# Patient Record
Sex: FEMALE | Race: WHITE | NOT HISPANIC OR LATINO | Employment: OTHER | URBAN - METROPOLITAN AREA
[De-identification: names, ages, dates, MRNs, and addresses within clinical notes are randomized per-mention and may not be internally consistent; named-entity substitution may affect disease eponyms.]

---

## 2017-01-30 ENCOUNTER — HOSPITAL ENCOUNTER (INPATIENT)
Facility: OTHER | Age: 82
LOS: 4 days | Discharge: HOME HEALTH | DRG: 194 | End: 2017-02-06
Attending: EMERGENCY MEDICINE | Admitting: HOSPITALIST
Payer: MEDICARE

## 2017-01-30 DIAGNOSIS — R05.9 COUGH: ICD-10-CM

## 2017-01-30 DIAGNOSIS — N17.9 AKI (ACUTE KIDNEY INJURY): Primary | ICD-10-CM

## 2017-01-30 DIAGNOSIS — N18.4 CHRONIC KIDNEY DISEASE, STAGE IV (SEVERE): ICD-10-CM

## 2017-01-30 DIAGNOSIS — I50.9 ACUTE CONGESTIVE HEART FAILURE, UNSPECIFIED CONGESTIVE HEART FAILURE TYPE: ICD-10-CM

## 2017-01-30 DIAGNOSIS — J11.1 FLU: ICD-10-CM

## 2017-01-30 DIAGNOSIS — E87.5 HYPERKALEMIA: ICD-10-CM

## 2017-01-30 DIAGNOSIS — J10.1 INFLUENZA A: ICD-10-CM

## 2017-01-30 PROBLEM — E87.8 HYPOCHLOREMIA: Status: ACTIVE | Noted: 2017-01-30

## 2017-01-30 PROBLEM — E83.51 HYPOCALCEMIA: Status: ACTIVE | Noted: 2017-01-30

## 2017-01-30 LAB
ALBUMIN SERPL BCP-MCNC: 3.6 G/DL
ALP SERPL-CCNC: 68 U/L
ALT SERPL W/O P-5'-P-CCNC: 39 U/L
ANION GAP SERPL CALC-SCNC: 13 MMOL/L
AST SERPL-CCNC: 34 U/L
BASOPHILS # BLD AUTO: ABNORMAL K/UL
BASOPHILS NFR BLD: 0 %
BILIRUB SERPL-MCNC: 0.4 MG/DL
BNP SERPL-MCNC: 608 PG/ML
BUN SERPL-MCNC: 72 MG/DL
CALCIUM SERPL-MCNC: 8.1 MG/DL
CHLORIDE SERPL-SCNC: 112 MMOL/L
CO2 SERPL-SCNC: 22 MMOL/L
CREAT SERPL-MCNC: 2.6 MG/DL
DIFFERENTIAL METHOD: ABNORMAL
EOSINOPHIL # BLD AUTO: ABNORMAL K/UL
EOSINOPHIL NFR BLD: 2 %
ERYTHROCYTE [DISTWIDTH] IN BLOOD BY AUTOMATED COUNT: 15.4 %
EST. GFR  (AFRICAN AMERICAN): 18 ML/MIN/1.73 M^2
EST. GFR  (NON AFRICAN AMERICAN): 16 ML/MIN/1.73 M^2
FLUAV AG SPEC QL IA: POSITIVE
FLUBV AG SPEC QL IA: NEGATIVE
GLUCOSE SERPL-MCNC: 88 MG/DL
HCT VFR BLD AUTO: 39.8 %
HGB BLD-MCNC: 13 G/DL
LYMPHOCYTES # BLD AUTO: ABNORMAL K/UL
LYMPHOCYTES NFR BLD: 16 %
MCH RBC QN AUTO: 30.9 PG
MCHC RBC AUTO-ENTMCNC: 32.7 %
MCV RBC AUTO: 95 FL
MONOCYTES # BLD AUTO: ABNORMAL K/UL
MONOCYTES NFR BLD: 10 %
NEUTROPHILS NFR BLD: 67 %
NEUTS BAND NFR BLD MANUAL: 5 %
PLATELET # BLD AUTO: 225 K/UL
PLATELET BLD QL SMEAR: ABNORMAL
PMV BLD AUTO: 10.6 FL
POTASSIUM SERPL-SCNC: 5.5 MMOL/L
PROT SERPL-MCNC: 7.5 G/DL
RBC # BLD AUTO: 4.21 M/UL
SODIUM SERPL-SCNC: 147 MMOL/L
SPECIMEN SOURCE: ABNORMAL
WBC # BLD AUTO: 9.36 K/UL

## 2017-01-30 PROCEDURE — 99285 EMERGENCY DEPT VISIT HI MDM: CPT | Mod: 25

## 2017-01-30 PROCEDURE — 93010 ELECTROCARDIOGRAM REPORT: CPT | Mod: ,,, | Performed by: INTERNAL MEDICINE

## 2017-01-30 PROCEDURE — 83880 ASSAY OF NATRIURETIC PEPTIDE: CPT

## 2017-01-30 PROCEDURE — 25000003 PHARM REV CODE 250: Performed by: PHYSICIAN ASSISTANT

## 2017-01-30 PROCEDURE — G0378 HOSPITAL OBSERVATION PER HR: HCPCS

## 2017-01-30 PROCEDURE — 85027 COMPLETE CBC AUTOMATED: CPT

## 2017-01-30 PROCEDURE — 99220 PR INITIAL OBSERVATION CARE,LEVL III: CPT | Mod: ,,, | Performed by: HOSPITALIST

## 2017-01-30 PROCEDURE — 80053 COMPREHEN METABOLIC PANEL: CPT

## 2017-01-30 PROCEDURE — 85007 BL SMEAR W/DIFF WBC COUNT: CPT

## 2017-01-30 PROCEDURE — 93005 ELECTROCARDIOGRAM TRACING: CPT

## 2017-01-30 PROCEDURE — 87400 INFLUENZA A/B EACH AG IA: CPT

## 2017-01-30 RX ORDER — FLUOXETINE 10 MG/1
10 CAPSULE ORAL DAILY
Status: DISCONTINUED | OUTPATIENT
Start: 2017-01-31 | End: 2017-02-06 | Stop reason: HOSPADM

## 2017-01-30 RX ORDER — DONEPEZIL HYDROCHLORIDE 5 MG/1
10 TABLET, FILM COATED ORAL DAILY
Status: DISCONTINUED | OUTPATIENT
Start: 2017-01-31 | End: 2017-02-06 | Stop reason: HOSPADM

## 2017-01-30 RX ORDER — HEPARIN SODIUM 5000 [USP'U]/ML
5000 INJECTION, SOLUTION INTRAVENOUS; SUBCUTANEOUS EVERY 8 HOURS
Status: DISCONTINUED | OUTPATIENT
Start: 2017-01-30 | End: 2017-02-06 | Stop reason: HOSPADM

## 2017-01-30 RX ORDER — OSELTAMIVIR PHOSPHATE 75 MG/1
30 CAPSULE ORAL 2 TIMES DAILY
Status: DISCONTINUED | OUTPATIENT
Start: 2017-01-30 | End: 2017-01-30

## 2017-01-30 RX ORDER — MEGESTROL ACETATE 40 MG/ML
200 SUSPENSION ORAL 3 TIMES DAILY
Status: ON HOLD | COMMUNITY
End: 2017-02-06 | Stop reason: HOSPADM

## 2017-01-30 RX ORDER — ACETAMINOPHEN 325 MG/1
650 TABLET ORAL EVERY 8 HOURS PRN
Status: DISCONTINUED | OUTPATIENT
Start: 2017-01-30 | End: 2017-02-06 | Stop reason: HOSPADM

## 2017-01-30 RX ORDER — ASPIRIN 81 MG/1
81 TABLET ORAL DAILY
Status: DISCONTINUED | OUTPATIENT
Start: 2017-01-31 | End: 2017-02-06 | Stop reason: HOSPADM

## 2017-01-30 RX ORDER — HYDROCODONE BITARTRATE AND ACETAMINOPHEN 5; 325 MG/1; MG/1
1 TABLET ORAL EVERY 4 HOURS PRN
Status: DISCONTINUED | OUTPATIENT
Start: 2017-01-30 | End: 2017-02-06 | Stop reason: HOSPADM

## 2017-01-30 RX ORDER — ONDANSETRON 2 MG/ML
4 INJECTION INTRAMUSCULAR; INTRAVENOUS EVERY 12 HOURS PRN
Status: DISCONTINUED | OUTPATIENT
Start: 2017-01-30 | End: 2017-02-06 | Stop reason: HOSPADM

## 2017-01-30 RX ORDER — QUETIAPINE FUMARATE 25 MG/1
25 TABLET, FILM COATED ORAL NIGHTLY
COMMUNITY

## 2017-01-30 RX ORDER — ALBUTEROL SULFATE 2.5 MG/.5ML
2.5 SOLUTION RESPIRATORY (INHALATION) EVERY 4 HOURS PRN
Status: DISCONTINUED | OUTPATIENT
Start: 2017-01-30 | End: 2017-01-31

## 2017-01-30 RX ORDER — AMLODIPINE BESYLATE 2.5 MG/1
2.5 TABLET ORAL DAILY
Status: DISCONTINUED | OUTPATIENT
Start: 2017-01-31 | End: 2017-01-31

## 2017-01-30 RX ORDER — GUAIFENESIN 100 MG/5ML
200 SOLUTION ORAL EVERY 4 HOURS PRN
Status: DISCONTINUED | OUTPATIENT
Start: 2017-01-30 | End: 2017-02-06 | Stop reason: HOSPADM

## 2017-01-30 RX ORDER — QUETIAPINE FUMARATE 25 MG/1
25 TABLET, FILM COATED ORAL DAILY
Status: DISCONTINUED | OUTPATIENT
Start: 2017-01-31 | End: 2017-02-06 | Stop reason: HOSPADM

## 2017-01-30 RX ORDER — HYDRALAZINE HYDROCHLORIDE 20 MG/ML
10 INJECTION INTRAMUSCULAR; INTRAVENOUS ONCE
Status: COMPLETED | OUTPATIENT
Start: 2017-01-31 | End: 2017-01-30

## 2017-01-30 RX ORDER — CINACALCET 30 MG/1
90 TABLET, FILM COATED ORAL DAILY
Status: DISCONTINUED | OUTPATIENT
Start: 2017-01-31 | End: 2017-01-31

## 2017-01-30 RX ORDER — AMOXICILLIN 250 MG
1 CAPSULE ORAL 2 TIMES DAILY
Status: DISCONTINUED | OUTPATIENT
Start: 2017-01-30 | End: 2017-02-06 | Stop reason: HOSPADM

## 2017-01-30 RX ORDER — FAMOTIDINE 20 MG/1
20 TABLET, FILM COATED ORAL 2 TIMES DAILY
Status: DISCONTINUED | OUTPATIENT
Start: 2017-01-30 | End: 2017-02-06 | Stop reason: HOSPADM

## 2017-01-30 RX ORDER — LEVOTHYROXINE SODIUM 150 UG/1
150 TABLET ORAL DAILY
COMMUNITY

## 2017-01-30 RX ORDER — LEVOTHYROXINE SODIUM 75 UG/1
150 TABLET ORAL DAILY
Status: DISCONTINUED | OUTPATIENT
Start: 2017-01-31 | End: 2017-02-06 | Stop reason: HOSPADM

## 2017-01-30 RX ORDER — FLUOXETINE 10 MG/1
20 CAPSULE ORAL DAILY
Status: ON HOLD | COMMUNITY
End: 2017-03-09

## 2017-01-30 RX ORDER — OSELTAMIVIR PHOSPHATE 6 MG/ML
30 FOR SUSPENSION ORAL 2 TIMES DAILY
Status: DISCONTINUED | OUTPATIENT
Start: 2017-01-30 | End: 2017-01-31

## 2017-01-30 RX ADMIN — HEPARIN SODIUM 5000 UNITS: 5000 INJECTION, SOLUTION INTRAVENOUS; SUBCUTANEOUS at 11:01

## 2017-01-30 RX ADMIN — FAMOTIDINE 20 MG: 20 TABLET, FILM COATED ORAL at 11:01

## 2017-01-30 RX ADMIN — HYDRALAZINE HYDROCHLORIDE 10 MG: 20 INJECTION INTRAMUSCULAR; INTRAVENOUS at 11:01

## 2017-01-30 NOTE — IP AVS SNAPSHOT
[facility] Location ([facility])  [address] [place] [address]  Phone: [phone]           Patient Discharge Instructions     Our goal is to set you up for success. This packet includes information on your condition, medications, and your home care. It will help you to care for yourself so you don't get sicker and need to go back to the hospital.     Please ask your nurse if you have any questions.        There are many details to remember when preparing to leave the hospital. Here is what you will need to do:    1. Take your medicine. If you are prescribed medications, review your Medication List in the following pages. You may have new medications to  at the pharmacy and others that you'll need to stop taking. Review the instructions for how and when to take your medications. Talk with your doctor or nurses if you are unsure of what to do.     2. Go to your follow-up appointments. Specific follow-up information is listed in the following pages. Your may be contacted by a transition nurse or clinical provider about future appointments. Be sure we have all of the phone numbers to reach you, if needed. Please contact your provider's office if you are unable to make an appointment.     3. Watch for warning signs. Your doctor or nurse will give you detailed warning signs to watch for and when to call for assistance. These instructions may also include educational information about your condition. If you experience any of warning signs to your health, call your doctor.               ** Verify the list of medication(s) below is accurate and up to date. Carry this with you in case of emergency. If your medications have changed, please notify your healthcare provider.             Medication List      START taking these medications        Additional Info                      carvedilol 6.25 MG tablet   Commonly known as:  COREG   Quantity:  60 tablet   Refills:  0   Dose:  6.25 mg    Last time  this was given:  6.25 mg on 2/6/2017  9:49 AM   Instructions:  Take 1 tablet (6.25 mg total) by mouth 2 (two) times daily with meals.     Begin Date    AM    Noon    PM    Bedtime       guaifenesin 100 mg/5 ml 100 mg/5 mL syrup   Commonly known as:  ROBITUSSIN   Refills:  0   Dose:  200 mg    Instructions:  Take 10 mLs (200 mg total) by mouth every 4 (four) hours as needed for Cough or Congestion.     Begin Date    AM    Noon    PM    Bedtime       senna-docusate 8.6-50 mg 8.6-50 mg per tablet   Commonly known as:  PERICOLACE   Refills:  0   Dose:  1 tablet    Last time this was given:  1 tablet on 2/6/2017  9:44 AM   Instructions:  Take 1 tablet by mouth 2 (two) times daily.     Begin Date    AM    Noon    PM    Bedtime         CHANGE how you take these medications        Additional Info                      lisinopril 10 MG tablet   Quantity:  30 tablet   Refills:  0   Dose:  10 mg   What changed:    - medication strength  - how much to take    Last time this was given:  10 mg on 2/6/2017  9:44 AM   Instructions:  Take 1 tablet (10 mg total) by mouth once daily.     Begin Date    AM    Noon    PM    Bedtime         CONTINUE taking these medications        Additional Info                      acetaminophen 500 mg Cap   Commonly known as:  TYLENOL   Refills:  0      Begin Date    AM    Noon    PM    Bedtime       aspirin 81 MG EC tablet   Commonly known as:  ECOTRIN   Quantity:  90 tablet   Refills:  3   Dose:  81 mg    Last time this was given:  81 mg on 2/6/2017  9:43 AM   Instructions:  Take 1 tablet (81 mg total) by mouth once daily.     Begin Date    AM    Noon    PM    Bedtime       calcitRIOL 0.25 MCG Cap   Commonly known as:  ROCALTROL   Refills:  0   Dose:  0.25 mcg    Last time this was given:  0.25 mcg on 2/6/2017  9:43 AM   Instructions:  Take 0.25 mcg by mouth once daily.     Begin Date    AM    Noon    PM    Bedtime       donepezil 10 MG tablet   Commonly known as:  ARICEPT   Refills:  0   Dose:  10 mg     Last time this was given:  10 mg on 2/6/2017  9:43 AM   Instructions:  Take 10 mg by mouth once daily.     Begin Date    AM    Noon    PM    Bedtime       fluoxetine 10 MG capsule   Commonly known as:  PROZAC   Refills:  0   Dose:  10 mg    Last time this was given:  10 mg on 2/6/2017  9:43 AM   Instructions:  Take 10 mg by mouth once daily.     Begin Date    AM    Noon    PM    Bedtime       levothyroxine 150 MCG tablet   Commonly known as:  SYNTHROID   Refills:  0   Dose:  150 mcg    Last time this was given:  150 mcg on 2/6/2017  6:05 AM   Instructions:  Take 150 mcg by mouth once daily.     Begin Date    AM    Noon    PM    Bedtime       oxycodone-acetaminophen 5-325 mg per tablet   Commonly known as:  PERCOCET   Quantity:  20 tablet   Refills:  0   Dose:  1 tablet    Instructions:  Take 1 tablet by mouth every 4 (four) hours as needed for Pain.     Begin Date    AM    Noon    PM    Bedtime       polyethylene glycol 3350 8.5 gram Pwpk   Refills:  0      Begin Date    AM    Noon    PM    Bedtime       quetiapine 25 MG Tab   Commonly known as:  SEROQUEL   Refills:  0   Dose:  25 mg    Last time this was given:  25 mg on 2/6/2017  9:43 AM   Instructions:  Take 25 mg by mouth once daily.     Begin Date    AM    Noon    PM    Bedtime         STOP taking these medications     amlodipine 2.5 MG tablet   Commonly known as:  NORVASC       AVASTIN 25 mg/mL injection   Generic drug:  bevacizumab       famciclovir 250 MG Tab   Commonly known as:  FAMVIR       megestrol 400 mg/10 mL (40 mg/mL) Susp   Commonly known as:  MEGACE       PREVNAR 13 (PF) 0.5 mL Syrg   Generic drug:  pneumoc 13-apryl conj-dip cr(PF)       PROLIA 60 mg/mL Syrg   Generic drug:  denosumab       SENSIPAR 90 MG Tab   Generic drug:  cinacalcet            Where to Get Your Medications      These medications were sent to [id number] [address] AT [facility] [address], [place]  [address]     Phone:  [phone]     carvedilol 6.25 MG tablet    lisinopril 10 MG tablet         You can get these medications from any pharmacy     You don't need a prescription for these medications     guaifenesin 100 mg/5 ml 100 mg/5 mL syrup    senna-docusate 8.6-50 mg 8.6-50 mg per tablet                  Please bring to all follow up appointments:    1. A copy of your discharge instructions.  2. All medicines you are currently taking in their original bottles.  3. Identification and insurance card.    Please arrive 15 minutes ahead of scheduled appointment time.    Please call 24 hours in advance if you must reschedule your appointment and/or time.        Your Scheduled Appointments     May 03, 2017 10:30 AM CDT   Established Patient Visit with Adrien Lazo MD   [facility] ([facility])    [address]  [address]  [place] [address] [phone]              Follow-up Information     Follow up with Susana Arvizu MD. Go on 2/8/2017.    Specialty:  Nephrology    Contact information:    [address]  [address]  [place] [address] [phone]          Follow up with [facility] Karissa.    Specialty:  Home Health Services    Why:  Home Health    Contact information:    [facility] [address] [place] [facility] [phone]          Discharge Instructions     Future Orders    Activity as tolerated     Call MD for:  difficulty breathing or increased cough     Call MD for:  increased confusion or weakness     Call MD for:  persistent dizziness, light-headedness, or visual disturbances     Call MD for:  persistent nausea and vomiting or diarrhea     Call MD for:  severe persistent headache     Call MD for:  severe uncontrolled pain     Call MD for:  temperature >100.4     Call MD for:  worsening rash     Diet renal         Primary Diagnosis     Your primary diagnosis was:  Flu      Admission Information     Date & Time Provider Department CSN    1/30/2017  6:03 PM  "Juanpablo Orr MD [facility] [id number]      Care Providers     Provider Role Specialty Primary office phone    Juanpablo Orr MD Attending Provider Hospitalist [phone]    Susana Arvizu MD Consulting Physician  Nephrology [phone]      Important Medicare Message          Most Recent Value    Important Message from Medicare Regarding Discharge Appeal Rights  Given to patient/caregiver, Explained to patient/caregiver, Signed/date by patient/caregiver yes 02/06/2017 1447      Your Vitals Were     BP Pulse Temp Resp Height Weight    104/52 (BP Location: Left arm, Patient Position: Lying, BP Method: Automatic) 98 98.9 °F (37.2 °C) (Oral) 18 5' 4" (1.626 m) 60.4 kg (133 lb 2.5 oz)    SpO2 BMI             95% 22.86 kg/m2         Recent Lab Values     No lab values to display.      Allergies as of 2/6/2017        Reactions    Penicillins     Doesn't remember it has been years      [facility]     [facility] Nurse Care Line - 24/7 Assistance  Unless otherwise directed by your provider, please contact [facility], our nurse care line that is available for 24/7 assistance.     Registered nurses in the [facility] provide clinical advisement, health education, appointment booking, and other advisory services.  Call for this free service at [phone].        Advance Directives     An advance directive is a document which, in the event you are no longer able to make decisions for yourself, tells your healthcare team what kind of treatment you do or do not want to receive, or who you would like to make those decisions for you.  If you do not currently have an advance directive, [facility] encourages you to create one.  For more information call:  [phone]), [phone] ([phone]),  or log on to [email or web address].        Language Assistance Services     ATTENTION: Language assistance services are available, free of charge. Please call " [phone].      ATENCIÓN: Si estellala prashanth, tiene a arriola disposición servicios gratuitos de asistencia lingüística. Abdoulaye al [phone].     [facility] Ý: N?u b?n nói Ti?ng Vi?t, có các d?ch v? h? tr? ngôn ng? mi?n phí dành cho b?n. G?i s? [phone].        Stroke Education              Heart Failure Education       Heart Failure: Being Active  You have a condition called heart failure. Being active doesnt mean that you have to wear yourself out. Even a little movement each day helps to strengthen your heart. If you cant get out to exercise, you can do simple stretching and strengthening exercises at home. These are good ways to keep you well-conditioned and prevent you and your heart from becoming excessively weak.    Ideas to get you started  · Add a little movement to things you do now. Walk to mail letters. Park your car at the far end of the parking lot and walk to the store. Walk up a flight of stairs instead of taking the elevator.  · Choose activities you enjoy. You might walk, swim, or ride an exercise bike. Things like gardening and washing the car count, too. Other possibilities include: washing dishes, walking the dog, walking around the mall, and doing aerobic activities with friends.  · Join a group exercise program at a [facility], a senior center, or a community center. Or look into a hospital cardiac rehabilitation program. Ask your doctor if you qualify.  Tips to keep you going  · Get up and get dressed each day. Go to a coffee shop and read a newspaper or go somewhere that you'll be in the presence of other active people. Youll feel more like being active.  · Make a plan. Choose one or more activities that you enjoy and that you can easily do. Then plan to do at least one each day. You might write your plan on a calendar.  · Go with a friend or a group if you like company. This can help you feel supported and stay motivated, too.  · Plan social events that you enjoy. This will keep you  mentally engaged as well as physically motivated to do things you find pleasure in.  For your safety  · Talk with your healthcare provider before starting an exercise program.  · Exercise indoors when its too hot or too cold outside, or when the air quality is poor. Try walking at a shopping mall.  · Wear socks and sturdy shoes to maintain your balance and prevent falls.  · Start slowly. Do a few minutes several times a day at first. Increase your time and speed little by little.  · Stop and rest whenever you feel tired or get short of breath.  · Dont push yourself on days when you dont feel well.  Date Last Reviewed: 3/20/2016  © 0106-8261 [employer]. [address] [place] [address], [place] [address]. All rights reserved. This information is not intended as a substitute for professional medical care. Always follow your healthcare professional's instructions.              Heart Failure: Evaluating Your Heart  You have a condition called heart failure. To evaluate your condition, your doctor will examine you, ask questions, and do some tests. Along with looking for signs of heart failure, the doctor looks for any other health problems that may have led to heart failure. The results of your evaluation will help your doctor form a treatment plan.  Health history and physical exam  Your visit will start with a health history. Tell the doctor about any symptoms youve noticed and about all medicines you take. Then youll have a physical exam. This includes listening to your heartbeat and breathing. Youll also be checked for swelling (edema) in your legs and neck. When you have fluid buildup or fluid in the lungs, it may be called congestive heart failure.  Diagnosing heart failure     During an echocardiogram, sound waves bounce off the heart. These are converted into a picture on the screen.   The following may be done to help your doctor form a diagnosis:  · X-rays show the size and shape of your heart.  These pictures can also show fluid in your lungs.  · An electrocardiogram (ECG or EKG) shows the pattern of your heartbeat. Small pads (electrodes) are placed on your chest, arms, and legs. Wires connect the pads to the ECG machine, which records your hearts electrical signals. This can give the doctor information about heart function.  · An echocardiogram uses ultrasound waves to show the structure and movement of your heart muscle. This shows how well the heart pumps. It also shows the thickness of the heart walls, and if the heart is enlarged. It is one of the most useful, non-invasive tests as it provides information about the heart's general function. This helps your doctor make treatment decisions.  · Lab tests evaluate small amounts of blood or urine for signs of problems. A BNP lab test can help diagnose and evaluate heart failure. BNP stands for B-type natriuretic peptide. The ventricles secrete more BNP when heart failure worsens. Lab tests can also provide information about metabolic dysfunction or heart dysfunction.  Your treatment plan  Based on the results of your evaluation and tests, your doctor will develop a treatment plan. This plan is designed to relieve some of your heart failure symptoms and help make you more comfortable. Your treatment plan may include:  · Medicine to help your heart work better and improve your quality of life  · Changes in what you eat and drink to help prevent fluid from backing up in your body  · Daily monitoring of your weight and heart failure symptoms to see how well your treatment plan is working  · Exercise to help you stay healthy  · Help with quitting smoking  · Emotional and psychological support to help adjust to the changes  · Referrals to other specialists to make sure you are being treated comprehensively  Date Last Reviewed: 3/21/2016  © 4634-8192 [employer]. [address] [place] [address], [place] [address]. All rights reserved. This information is  not intended as a substitute for professional medical care. Always follow your healthcare professional's instructions.              Heart Failure: Making Changes to Your Diet  You have a condition called heart failure. When you have heart failure, excess fluid is more likely to build up in your body because your heart isn't working well. This makes the heart work harder to pump blood. Fluid buildup causes symptoms such as shortness of breath and swelling (edema). This is often referred to as congestive heart failure or CHF. Controlling the amount of salt (sodium) you eat may help stop fluid from building up. Your doctor may also tell you to reduce the amount of fluid you drink.  Reading food labels    Your healthcare provider will tell you how much sodium you can eat each day. Read food labels to keep track. Keep in mind that certain foods are high in salt. These include canned, frozen, and processed foods. Check the amount of sodium in each serving. Watch out for high-sodium ingredients. These include MSG (monosodium glutamate), baking soda, and sodium phosphate.   Eating less salt  Give yourself time to get used to eating less salt. It may take a little while. Here are some tips to help:  · Take the saltshaker off the table. Replace it with salt-free herb mixes and spices.  · Eat fresh or plain frozen vegetables. These have much less salt than canned vegetables.  · Choose low-sodium snacks like sodium-free pretzels, crackers, or air-popped popcorn.  · Dont add salt to your food when youre cooking. Instead, season your foods with pepper, lemon, garlic, or onion.  · When you eat out, ask that your food be cooked without added salt.  · Avoid eating fried foods as these often have a great deal of salt.  If youre told to limit fluids  You may need to limit how much fluid you have to help prevent swelling. This includes anything that is liquid at room temperature, such as ice cream and soup. If your doctor tells you  to limit fluid, try these tips:  · Measure drinks in a measuring cup before you drink them. This will help you meet daily goals.  · Chill drinks to make them more refreshing.  · Suck on frozen lemon wedges to quench thirst.  · Only drink when youre thirsty.  · Chew sugarless gum or suck on hard candy to keep your mouth moist.  · Weigh yourself daily to know if your body's fluid content is rising.  My sodium goal  Your healthcare provider may give you a sodium goal to meet each day. This includes sodium found in food as well as salt that you add. My goal is to eat no more than ___________ mg of sodium per day.     When to call your doctor  Call your doctor right away if you have any symptoms of worsening heart failure. These can include:  · Sudden weight gain  · Increased swelling of your legs or ankles  · Trouble breathing when youre resting or at night  · Increase in the number of pillows you have to sleep on  · Chest pain, pressure, discomfort, or pain in the jaw, neck, or back   Date Last Reviewed: 3/21/2016  © 7011-3291 [employer]. [address] [place] [address]. All rights reserved. This information is not intended as a substitute for professional medical care. Always follow your healthcare professional's instructions.              Heart Failure: Medicines to Help Your Heart    You have a condition called heart failure (also known as congestive heart failure, or CHF). Your doctor will likely prescribe medicines for heart failure and any underlying health problems you have. Most heart failure patients take one or more types of medicinen. Your healthcare provider will work to find the combination of medicines that works best for you.  Heart failure medicines  Here are the most common heart failure medicines:  · ACE inhibitors lower blood pressure and decrease strain on the heart. This makes it easier for the heart to pump. Angiotensin receptor blockers have similar effects. These are  prescribed for some patients instead of ACE inhibitors.  · Beta-blockers relieve stress on the heart. They also improve symptoms. They may also improve the heart's pumping action over time.  · Diuretics (also called water pills) help rid your body of excess water. This can help rid your body of swelling (edema). Having less fluid to pump means your heart doesnt have to work as hard. Some diuretics make your body lose a mineral called potassium. Your doctor will tell you if you need to take supplements or eat more foods high in potassium.  · Digoxin helps your heart pump with more strength. This helps your heart pump more blood with each beat. So, more oxygen-rich blood travels to the rest of the body.  · Aldosterone antagonists help alter hormones and decrease strain on the heart.  · Hydralazine and nitrates are two separate medicines used together to treat heart failure. They may come in one combination pill. They lower blood pressure and decrease how hard the heart has to pump.  Medicines for related conditions  Controlling other heart problems helps keep heart failure under control, too. Depending on other heart problems you have, medicines may be prescribed to:  · Lower blood pressure (antihypertensives).  · Lower cholesterol levels (statins).  · Prevent blood clots (anticoagulants or aspirin).  · Keep the heartbeat steady (antiarrhythmics).  Date Last Reviewed: 3/5/2016  © 1015-0295 [employer]. [address] [place] [address], [place] [address]. All rights reserved. This information is not intended as a substitute for professional medical care. Always follow your healthcare professional's instructions.              Heart Failure: Procedures That May Help    The heart is a muscle that pumps oxygen-rich blood to all parts of the body. When you have heart failure, the heart is not able to pump as well as it should. Blood and fluid may back up into the lungs (congestive heart failure), and some parts of  the body dont get enough oxygen-rich blood to work normally. These problems lead to the symptoms of heart failure.     Certain procedures may help the heart pump better in some cases of heart failure. Some procedures are done to treat health problems that may have caused the heart failure such as coronary artery disease or heart rhythm problems. For more serious heart failure, other options are available.  Treating artery and valve problems  If you have coronary artery disease or valve disease, procedures may be done to improve blood flow. This helps the heart pump better, which can improve heart failure symptoms. First, your doctor may do a cardiac catheterization to help detect clogged blood vessels or valve damage. During this procedure, a  thin tube (catheter) in inserted into a blood vessel and guided to the heart. There a dye is injected and a special type of X-ray (angiogram) is taken of the blood vessels. Procedures to open a blocked artery or fix damaged valves can also be done using catheterization.  · Angioplasty uses a balloon-tipped instrument at the end of the catheter. The balloon is inflated to widen the narrowed artery. In many cases, a stent is expanded to further support the narrowed artery. A stent is a metal mesh tube.  · Valve surgery repairs or replacement of faulty valves can also be done during catheterization so blood can flow properly through the chambers of the heart.  Bypass surgery is another option to help treat blocked arteries. It uses a healthy blood vessel from elsewhere in the body. The healthy blood vessel is attached above and below the blocked area so that blood can flow around the blocked artery.  Treating heart rhythm problems  A device may be placed in the chest to help a weak heart maintain a healthy, heartbeat so the heart can pump more effectively:  · Pacemaker. A pacemaker is an implanted device that regulates your heartbeat electronically. It monitors your heart's  rhythm and generates a painless electric impulse that helps the heart beat in a regular rhythm. A pacemaker is programmed to meet your specific heart rhythm needs.  · Biventricular pacing/cardiac resynchronization therapy. A type of pacemaker that paces both pumping chambers of the heart at the same time to coordinate contractions and to improve the heart's function. Some people with heart failure are candidates for this therapy.  · Implantable cardioverter defibrillator. A device similar to a pacemaker that senses when the heart is beating too fast and delivers an electrical shock to convert the fast rhythm to a normal rhythm. This can be a life saving device.  In severe cases  In more serious cases of heart failure when other treatments no longer work, other options may include:  · Ventricular assist devices (VADs). These are mechanical devices used to take over the pumping function for one or both of the heart's ventricles, or pumping chambers. A VAD may be necessary when heart failure progresses to the point that medicines and other treatments no longer help. In some cases, a VAD may be used as a bridge to transplant.  · Heart transplant. This is replacing the diseased heart with a healthy one from a donor. This is an option for a few people who are very sick. A heart transplant is very serious and not an option for all patients. Your doctor can tell you more.  Date Last Reviewed: 3/20/2016  © 6238-9703 [employer]. [address] [place] [address], [place] [address]. All rights reserved. This information is not intended as a substitute for professional medical care. Always follow your healthcare professional's instructions.              Heart Failure: Tracking Your Weight  You have a condition called heart failure. When you have heart failure, a sudden weight gain or a steady rise in weight is a warning sign that your body is retaining too much water and salt. This could mean your heart failure is getting  worse. If left untreated, it can cause problems for your lungs and result in shortness of breath. Weighing yourself each day is the best way to know if youre retaining water. If your weight goes up quickly, call your doctor. You will be given instructions on how to get rid of the excess water. You will likely need medicines and to avoid salt. This will help your heart work better.  Call your doctor if you gain more than 2 pounds in 1 day, more than 5 pounds in 1 week, or whatever weight gain you were told to report by your doctor. This is often a sign of worsening heart failure and needs to be evaluated and treated. Your doctor will tell you what to do next.   Tips for weighing yourself    · Weigh yourself at the same time each morning, wearing the same clothes. Weigh yourself after urinating and before eating.  · Use the same scale each day. Make sure the numbers are easy to read. Put the scale on a flat, hard surface -- not on a rug or carpet.  · Do not stop weighing yourself. If you forget one day, weigh again the next morning.  How to use your weight chart  · Keep your weight chart near the scale. Write your weight on the chart as soon as you get off the scale.  · Fill in the month and the start date on the chart. Then write down your weight each day. Your chart will look like this:    · If you miss a day, leave the space blank. Weigh yourself the next day and write your weight in the next space.  · Take your weight chart with you when you go to see your doctor.  Date Last Reviewed: 3/20/2016  © 7454-2642 [employer]. [address] [place] [address]. All rights reserved. This information is not intended as a substitute for professional medical care. Always follow your healthcare professional's instructions.              Heart Failure: Warning Signs of a Flare-Up  You have a condition called heart failure. Once you have heart failure, flare-ups can happen. Below are signs that can mean  your heart failure is getting worse. If you notice any of these warning signs, call your healthcare provider.  Swelling    · Your feet, ankles, or lower legs get puffier.  · You notice skin changes on your lower legs.  · Your shoes feel too tight.  · Your clothes are tighter in the waist.  · You have trouble getting rings on or off your fingers.  Shortness of breath  · You have to breathe harder even when youre doing your normal activities or when youre resting.  · You are short of breath walking up stairs or even short distances.  · You wake up at night short of breath or coughing.  · You need to use more pillows or sit up to sleep.  · You wake up tired or restless.  Other warning signs  · You feel weaker, dizzy, or more tired.  · You have chest pain or changes in your heartbeat.  · You have a cough that wont go away.  · You cant remember things or dont feel like eating.  Tracking your weight  Gaining weight is often the first warning sign that heart failure is getting worse. Gaining even a few pounds can be a sign that your body is retaining excess water and salt. Weighing yourself each day in the morning after you urinate and before you eat, is the best way to know if you're retaining water. Get a scale that is easy to read and make sure you wear the same clothes and use the same scale every time you weigh. Your healthcare provider will show you how to track your weight. Call your doctor if you gain more than 2 pounds in 1 day, 5 pounds in 1 week, or whatever weight gain you were told to report by your doctor. This is often a sign of worsening heart failure and needs to be evaluated and treated before it compromises your breathing. Your doctor will tell you what to do next.    Date Last Reviewed: 3/15/2016  © 6766-6995 [employer]. [address] [place] [address], [place] [address]. All rights reserved. This information is not intended as a substitute for professional medical care. Always follow your  healthcare professional's instructions.              Chronic Kindey Disease Education             [facility] Sign-Up     Activating your [facility] account is as easy as 1-2-3!     1) Visit [email or web address], select Sign Up Now, enter this activation code and your date of birth, then select Next.  [email or web address]  Expires: 3/23/2017  1:15 PM      2) Create a username and password to use when you visit [facility] in the future and select a security question in case you lose your password and select Next.    3) Enter your e-mail address and click Sign Up!    Additional Information  If you have questions, please e-mail [email or web address] or call [phone] to talk to our [facility] staff. Remember, [facility] is NOT to be used for urgent needs. For medical emergencies, dial 911.          [facility] complies with applicable Federal civil rights laws and does not discriminate on the basis of race, color, national origin, age, disability, or sex.

## 2017-01-31 LAB
ALBUMIN SERPL BCP-MCNC: 3.4 G/DL
ALP SERPL-CCNC: 69 U/L
ALT SERPL W/O P-5'-P-CCNC: 32 U/L
ANION GAP SERPL CALC-SCNC: 13 MMOL/L
ANISOCYTOSIS BLD QL SMEAR: SLIGHT
AST SERPL-CCNC: 31 U/L
BASOPHILS # BLD AUTO: ABNORMAL K/UL
BASOPHILS NFR BLD: 0 %
BILIRUB SERPL-MCNC: 0.4 MG/DL
BUN SERPL-MCNC: 71 MG/DL
CALCIUM SERPL-MCNC: 8 MG/DL
CHLORIDE SERPL-SCNC: 115 MMOL/L
CO2 SERPL-SCNC: 19 MMOL/L
CREAT SERPL-MCNC: 2.6 MG/DL
DIFFERENTIAL METHOD: ABNORMAL
EOSINOPHIL # BLD AUTO: ABNORMAL K/UL
EOSINOPHIL NFR BLD: 0 %
ERYTHROCYTE [DISTWIDTH] IN BLOOD BY AUTOMATED COUNT: 15.1 %
EST. GFR  (AFRICAN AMERICAN): 18 ML/MIN/1.73 M^2
EST. GFR  (NON AFRICAN AMERICAN): 16 ML/MIN/1.73 M^2
GLUCOSE SERPL-MCNC: 114 MG/DL
HCT VFR BLD AUTO: 38.3 %
HGB BLD-MCNC: 12.6 G/DL
LYMPHOCYTES # BLD AUTO: ABNORMAL K/UL
LYMPHOCYTES NFR BLD: 8 %
MCH RBC QN AUTO: 30.9 PG
MCHC RBC AUTO-ENTMCNC: 32.9 %
MCV RBC AUTO: 94 FL
METAMYELOCYTES NFR BLD MANUAL: 1 %
MONOCYTES # BLD AUTO: ABNORMAL K/UL
MONOCYTES NFR BLD: 5 %
NEUTROPHILS NFR BLD: 76 %
NEUTS BAND NFR BLD MANUAL: 10 %
PLATELET # BLD AUTO: 188 K/UL
PLATELET BLD QL SMEAR: ABNORMAL
PMV BLD AUTO: 10.8 FL
POLYCHROMASIA BLD QL SMEAR: ABNORMAL
POTASSIUM SERPL-SCNC: 4.8 MMOL/L
PROT SERPL-MCNC: 7.1 G/DL
RBC # BLD AUTO: 4.08 M/UL
SODIUM SERPL-SCNC: 147 MMOL/L
WBC # BLD AUTO: 9.89 K/UL

## 2017-01-31 PROCEDURE — 94640 AIRWAY INHALATION TREATMENT: CPT

## 2017-01-31 PROCEDURE — 97110 THERAPEUTIC EXERCISES: CPT

## 2017-01-31 PROCEDURE — 97161 PT EVAL LOW COMPLEX 20 MIN: CPT

## 2017-01-31 PROCEDURE — 97530 THERAPEUTIC ACTIVITIES: CPT

## 2017-01-31 PROCEDURE — G8988 SELF CARE GOAL STATUS: HCPCS | Mod: CJ

## 2017-01-31 PROCEDURE — 93005 ELECTROCARDIOGRAM TRACING: CPT

## 2017-01-31 PROCEDURE — 25000003 PHARM REV CODE 250: Performed by: NURSE PRACTITIONER

## 2017-01-31 PROCEDURE — 99900035 HC TECH TIME PER 15 MIN (STAT)

## 2017-01-31 PROCEDURE — 97165 OT EVAL LOW COMPLEX 30 MIN: CPT

## 2017-01-31 PROCEDURE — 85027 COMPLETE CBC AUTOMATED: CPT

## 2017-01-31 PROCEDURE — G8987 SELF CARE CURRENT STATUS: HCPCS | Mod: CK

## 2017-01-31 PROCEDURE — 97166 OT EVAL MOD COMPLEX 45 MIN: CPT

## 2017-01-31 PROCEDURE — 25000003 PHARM REV CODE 250: Performed by: INTERNAL MEDICINE

## 2017-01-31 PROCEDURE — G8979 MOBILITY GOAL STATUS: HCPCS | Mod: CI

## 2017-01-31 PROCEDURE — 36415 COLL VENOUS BLD VENIPUNCTURE: CPT

## 2017-01-31 PROCEDURE — 63600175 PHARM REV CODE 636 W HCPCS: Performed by: INTERNAL MEDICINE

## 2017-01-31 PROCEDURE — 25000242 PHARM REV CODE 250 ALT 637 W/ HCPCS: Performed by: INTERNAL MEDICINE

## 2017-01-31 PROCEDURE — G0378 HOSPITAL OBSERVATION PER HR: HCPCS

## 2017-01-31 PROCEDURE — G8978 MOBILITY CURRENT STATUS: HCPCS | Mod: CJ

## 2017-01-31 PROCEDURE — 94799 UNLISTED PULMONARY SVC/PX: CPT

## 2017-01-31 PROCEDURE — 85007 BL SMEAR W/DIFF WBC COUNT: CPT

## 2017-01-31 PROCEDURE — 25000003 PHARM REV CODE 250: Performed by: PHYSICIAN ASSISTANT

## 2017-01-31 PROCEDURE — 80053 COMPREHEN METABOLIC PANEL: CPT

## 2017-01-31 PROCEDURE — 93010 ELECTROCARDIOGRAM REPORT: CPT | Mod: ,,, | Performed by: INTERNAL MEDICINE

## 2017-01-31 PROCEDURE — 63600175 PHARM REV CODE 636 W HCPCS: Performed by: PHYSICIAN ASSISTANT

## 2017-01-31 RX ORDER — AMLODIPINE BESYLATE 5 MG/1
5 TABLET ORAL DAILY
Status: DISCONTINUED | OUTPATIENT
Start: 2017-01-31 | End: 2017-02-01

## 2017-01-31 RX ORDER — CLONIDINE HYDROCHLORIDE 0.1 MG/1
0.1 TABLET ORAL EVERY 6 HOURS PRN
Status: DISCONTINUED | OUTPATIENT
Start: 2017-01-31 | End: 2017-02-06 | Stop reason: HOSPADM

## 2017-01-31 RX ORDER — HYDRALAZINE HYDROCHLORIDE 25 MG/1
50 TABLET, FILM COATED ORAL EVERY 12 HOURS
Status: DISCONTINUED | OUTPATIENT
Start: 2017-01-31 | End: 2017-02-01

## 2017-01-31 RX ORDER — FUROSEMIDE 10 MG/ML
40 INJECTION INTRAMUSCULAR; INTRAVENOUS ONCE
Status: COMPLETED | OUTPATIENT
Start: 2017-01-31 | End: 2017-01-31

## 2017-01-31 RX ORDER — LISINOPRIL 20 MG/1
20 TABLET ORAL ONCE
Status: COMPLETED | OUTPATIENT
Start: 2017-01-31 | End: 2017-01-31

## 2017-01-31 RX ORDER — OSELTAMIVIR PHOSPHATE 6 MG/ML
30 FOR SUSPENSION ORAL DAILY
Status: DISCONTINUED | OUTPATIENT
Start: 2017-02-01 | End: 2017-02-02

## 2017-01-31 RX ORDER — IPRATROPIUM BROMIDE AND ALBUTEROL SULFATE 2.5; .5 MG/3ML; MG/3ML
3 SOLUTION RESPIRATORY (INHALATION)
Status: DISCONTINUED | OUTPATIENT
Start: 2017-01-31 | End: 2017-02-06 | Stop reason: HOSPADM

## 2017-01-31 RX ORDER — CALCITRIOL 0.25 UG/1
0.25 CAPSULE ORAL DAILY
Status: DISCONTINUED | OUTPATIENT
Start: 2017-01-31 | End: 2017-02-06 | Stop reason: HOSPADM

## 2017-01-31 RX ADMIN — HEPARIN SODIUM 5000 UNITS: 5000 INJECTION, SOLUTION INTRAVENOUS; SUBCUTANEOUS at 06:01

## 2017-01-31 RX ADMIN — FAMOTIDINE 20 MG: 20 TABLET, FILM COATED ORAL at 09:01

## 2017-01-31 RX ADMIN — HYDRALAZINE HYDROCHLORIDE 50 MG: 25 TABLET, FILM COATED ORAL at 09:01

## 2017-01-31 RX ADMIN — SODIUM CHLORIDE 250 ML: 0.45 INJECTION, SOLUTION INTRAVENOUS at 10:01

## 2017-01-31 RX ADMIN — IPRATROPIUM BROMIDE AND ALBUTEROL SULFATE 3 ML: .5; 3 SOLUTION RESPIRATORY (INHALATION) at 08:01

## 2017-01-31 RX ADMIN — FLUOXETINE 10 MG: 10 CAPSULE ORAL at 09:01

## 2017-01-31 RX ADMIN — HYDRALAZINE HYDROCHLORIDE 50 MG: 25 TABLET, FILM COATED ORAL at 06:01

## 2017-01-31 RX ADMIN — STANDARDIZED SENNA CONCENTRATE AND DOCUSATE SODIUM 1 TABLET: 8.6; 5 TABLET, FILM COATED ORAL at 09:01

## 2017-01-31 RX ADMIN — CINACALCET HYDROCHLORIDE 90 MG: 30 TABLET, COATED ORAL at 09:01

## 2017-01-31 RX ADMIN — QUETIAPINE FUMARATE 25 MG: 25 TABLET, FILM COATED ORAL at 09:01

## 2017-01-31 RX ADMIN — ASPIRIN 81 MG: 81 TABLET, COATED ORAL at 09:01

## 2017-01-31 RX ADMIN — LEVOTHYROXINE SODIUM 150 MCG: 75 TABLET ORAL at 06:01

## 2017-01-31 RX ADMIN — OSELTAMIVIR PHOSPHATE 30 MG: 6 POWDER, FOR SUSPENSION ORAL at 09:01

## 2017-01-31 RX ADMIN — FUROSEMIDE 40 MG: 10 INJECTION, SOLUTION INTRAVENOUS at 02:01

## 2017-01-31 RX ADMIN — AMLODIPINE BESYLATE 2.5 MG: 2.5 TABLET ORAL at 09:01

## 2017-01-31 RX ADMIN — OSELTAMIVIR PHOSPHATE 30 MG: 6 POWDER, FOR SUSPENSION ORAL at 12:01

## 2017-01-31 RX ADMIN — HEPARIN SODIUM 5000 UNITS: 5000 INJECTION, SOLUTION INTRAVENOUS; SUBCUTANEOUS at 01:01

## 2017-01-31 RX ADMIN — LISINOPRIL 20 MG: 20 TABLET ORAL at 12:01

## 2017-01-31 RX ADMIN — CALCITRIOL 0.25 MCG: 0.25 CAPSULE, LIQUID FILLED ORAL at 11:01

## 2017-01-31 RX ADMIN — AMLODIPINE BESYLATE 5 MG: 5 TABLET ORAL at 10:01

## 2017-01-31 RX ADMIN — DONEPEZIL HYDROCHLORIDE 10 MG: 5 TABLET, FILM COATED ORAL at 09:01

## 2017-01-31 NOTE — PLAN OF CARE
Problem: Occupational Therapy Goal  Goal: Occupational Therapy Goal  Goals to be met by: 2/6/17     Patient will increase functional independence with ADLs by performing:    LE Dressing with Supervision.  Grooming while standing at sink with Supervision.  Maximize endurance, to be able to tolerate attending to grooming in standing x 3 tasks.  .  Outcome: Ongoing (interventions implemented as appropriate)  OT eval completed. Recommend HHOT and 24hr supervision in familiar home environment.     AYAZ Hurley/L  1/31/2017   ,

## 2017-01-31 NOTE — PLAN OF CARE
Problem: Patient Care Overview  Goal: Plan of Care Review  Outcome: Ongoing (interventions implemented as appropriate)  Recommendations  1. Provided elderly diet education to pt daughter   2. Refer pt to home care agencies for meal assistance  3. RD to monitor  Goals: 1.Pt to consume>75% EEN   Nutrition Goal Status: new  Communication of RD Recs: other (comment) (Sticky note)     Continuum of Care Plan  Referral to Outpatient Services: home care

## 2017-01-31 NOTE — ASSESSMENT & PLAN NOTE
- Likely 2/2 h/o hyperparathyroidism  - s/p partial parathyroidectomy  - recent change in dose of medications

## 2017-01-31 NOTE — PROGRESS NOTES
[facility]  Adult Nutrition  Consult Note    SUMMARY     Recommendations  1. Provided elderly diet education to pt daughter   2. Refer pt to home care agencies for meal assistance  3. RD to monitor  Goals: 1.Pt to consume>75% EEN   Nutrition Goal Status: new  Communication of RD Recs: other (comment) (Sticky note)    Continuum of Care Plan  Referral to Outpatient Services: home care    Reason for Assessment  Reason for Assessment: physician consult  Diagnosis:  (influenza)  Relevent Medical History: Alzheimers, dementia, HTN, CKD IV,    Interdisciplinary Rounds: attended  General Information Comments: Spoke with pt daughter, is concerned about pt inablility to prepare own foods/remember to eat despite having a good appetite    Nutrition Prescription Ordered  Current Diet Order: Renal  Oral Nutrition Supplement: none     Nutrition Risk Screen   Nutrition Risk Screen: unintentional loss of 10 lbs or more in the past 2 mos    Nutrition/Diet History  Patient Reported Diet/Restrictions/Preferences: renal  Typical Food/Fluid Intake: appetite improving since beginning megace  Food Preferences: no preferences identified   Supplemental Drinks or Food Habits: Nepro  Factors Affecting Nutritional Intake: behavioral (mealtime), other (see comments), impaired cognitive status/motor control (pt needs assistance preparing foods/remembering to eat)    Labs/Tests/Procedures/Meds  Diagnostic Test/Procedure Review: reviewed  Pertinent Labs Reviewed: reviewed  Pertinent Labs Comments: BUN 71, Cr 2.6, Calcium 8, Sodium 147  Pertinent Medications Reviewed: reviewed  Pertinent Medications Comments: senna docusate, ondansetron, phenergan, amlodipine, famotidine    Physical Findings  Overall Physical Appearance: other (see comments) (adanced age)  Skin: intact (bruiding on arms)    Anthropometrics  Height (inches): 64.02 in  Weight Method: Stated  Weight (kg): 60.4 kg  Ideal Body Weight (IBW), Female: 120.1 lb  % Ideal  Body Weight, Female (lb): 110.87 lb  BMI (kg/m2): 22.85  BMI Grade: 18.5-24.9 - normal  Usual Body Weight (UBW), k kg  % Usual Body Weight: 92.92  % Weight Change: 7 kg  Weight Loss: unintentional    Estimated/Assessed Needs  Weight Used For Calorie Calculations: 60.4 kg (133 lb 2.5 oz)   Height (cm): 162.6 cm  Energy Need Method: Kcal/kg (1812 (30kcal/kg))  30 kcal/kg (kcal): 1812   RMR ([place]-St. Jeor Equation): 1020.77  Weight Used For Protein Calculations: 60.4 kg (133 lb 2.5 oz)  Protein Requirements: 48g  0.8 gm Protein (gm): 48.42  Fluid Need Method: RDA Method    Malnutrition (Undernutrition) Diagnosis  % Intake of Estimated Energy Needs: 25 - 50%  % Meal Intake: 75%  Malnutrition Reason: illness related (Dementia)  Moderate Wt. Loss Social/Environmental:  (7% over six months)    Nutrition Diagnosis  Nutrition Problem: Other (see comments) (Inability to manage self care)  Etiology/Related To: progression of Alzheimers  Nutrition Diagnosis Signs/Symptoms As Evidenced By: Pt daughter report of pt skipping meals/forgetting to eat, wt loss of 7% over past 6 months  Nutrition Diagnosis Status: New    Monitor and Evaluation  Food and Nutrient Intake: energy intake, food and beverage intake  Food and Nutrient Adminstration: diet order  Physical Activity and Function: nutrition-related ADLs and IADLs  Anthropometric Measurements: weight change, weight  Biochemical Data, Medical Tests and Procedures: electrolyte and renal panel  Nutrition-Focused Physical Findings: overall appearance    Nutrition Risk  Level of Risk:  (follow once weekly)    Nutrition Follow-Up  RD Follow-up?: Yes

## 2017-01-31 NOTE — ED PROVIDER NOTES
Encounter Date: 1/30/2017       History     Chief Complaint   Patient presents with    Cough     daughter reports cough for 3-4 days and pt has been sleeping often.      Review of patient's allergies indicates:   Allergen Reactions    Penicillins      Doesn't remember it has been years     HPI Comments: Patient is an 89-year-old female with history of dementia, hypertension, osteoporosis, and renal failure who presents to the emergency department with cough and shortness of breath.  Patient's daughter accompanies her.  Patient's daughter states they recently traveled on a cruise and just got home yesterday.  Patient started noticed decrease in appetite and shortness of breath while sleeping.  Patient's daughter reports a cough with sputum production.  Patient's daughter states her mother is not responding as much as she normally does.  Patient's daughter states she is sleeping more than normal.    The history is provided by the patient.     Past Medical History   Diagnosis Date    Dementia 11/2/2016     2012: Diagnosed with Alzheimer's Dementia.    Hypertension     Osteoporosis     Renal disorder      No past medical history pertinent negatives.  Past Surgical History   Procedure Laterality Date    Parathyroid removal       History reviewed. No pertinent family history.  Social History   Substance Use Topics    Smoking status: Never Smoker    Smokeless tobacco: Never Used    Alcohol use None     Review of Systems   Constitutional: Positive for activity change, appetite change and fatigue. Negative for chills and fever.   HENT: Positive for congestion and rhinorrhea. Negative for ear discharge, ear pain, hearing loss, mouth sores, postnasal drip, sore throat and trouble swallowing.    Eyes: Negative for photophobia and visual disturbance.   Respiratory: Positive for cough and shortness of breath.    Cardiovascular: Negative for chest pain.   Gastrointestinal: Negative for abdominal pain, blood in stool,  constipation, diarrhea, nausea and vomiting.   Genitourinary: Negative for dysuria, flank pain and hematuria.   Musculoskeletal: Negative for back pain, neck pain and neck stiffness.   Neurological: Negative for dizziness, speech difficulty, weakness, light-headedness, numbness and headaches.       Physical Exam   Initial Vitals   BP Pulse Resp Temp SpO2   01/30/17 1718 01/30/17 1718 01/30/17 1718 01/30/17 1718 01/30/17 1718   143/73 99 18 98.6 °F (37 °C) 99 %     Physical Exam    Nursing note and vitals reviewed.  Constitutional: She appears well-developed and well-nourished. She is not diaphoretic.  Non-toxic appearance. No distress.   HENT:   Head: Normocephalic.   Right Ear: Hearing and external ear normal.   Left Ear: Hearing and external ear normal.   Nose: Nose normal.   Mouth/Throat: Uvula is midline and oropharynx is clear and moist. No trismus in the jaw. No uvula swelling. No oropharyngeal exudate.   Eyes: Conjunctivae are normal. Pupils are equal, round, and reactive to light.   Neck: Normal range of motion.   Cardiovascular: Normal rate and regular rhythm.   No lower extremity edema   Pulmonary/Chest: She has rhonchi. She has rales (right lung fields).   Abdominal: Soft. Bowel sounds are normal. She exhibits no distension and no mass. There is no tenderness. There is no rebound and no guarding.   Lymphadenopathy:     She has no cervical adenopathy.   Neurological: She is alert.   nonverbal   Skin: Skin is warm and dry.   Psychiatric: She has a normal mood and affect.         ED Course   Procedures  Labs Reviewed   CBC W/ AUTO DIFFERENTIAL - Abnormal; Notable for the following:        Result Value    RDW 15.4 (*)     Lymph% 16.0 (*)     All other components within normal limits   COMPREHENSIVE METABOLIC PANEL - Abnormal; Notable for the following:     Sodium 147 (*)     Potassium 5.5 (*)     Chloride 112 (*)     CO2 22 (*)     BUN, Bld 72 (*)     Creatinine 2.6 (*)     Calcium 8.1 (*)     eGFR if African  American 18 (*)     eGFR if non  16 (*)     All other components within normal limits   B-TYPE NATRIURETIC PEPTIDE - Abnormal; Notable for the following:      (*)     All other components within normal limits   INFLUENZA A AND B ANTIGEN - Abnormal; Notable for the following:     Influenza A Ag, EIA Positive (*)     All other components within normal limits     EKG Readings: (Independently Interpreted)   Initial Reading: No STEMI. Rhythm: Normal Sinus Rhythm. Heart Rate: 95. Conduction: RBBB.       X-Rays:   Independently Interpreted Readings:   Other Readings:  No evidence of acute intrathoracic disease    Imaging Results         X-Ray Chest PA And Lateral (Final result) Result time:  01/30/17 19:12:35    Final result by Keith Solares MD (01/30/17 19:12:35)    Impression:        No evidence of acute intrathoracic disease.  Density overlying the right lower lung field there is degenerative change at the costochondral cartilages in that region.      Electronically signed by: KEITH SOLARES MD  Date:     01/30/17  Time:    19:12     Narrative:    Technique:  Chest PA and lateral radiographs    Comparison: 9/4/12    Findings:     Patient is somewhat rotated and the AP film. There is severe kyphosis of the thoracic spine with at least 2 which vertebral body compression fractures, unchanged from prior. Visualized portions of the lungs are clear. Subtle calcific density overlying the right lower lung, may indicate costochondral cartilage. No pleural fluid or pneumothorax. The mediastinal structures are midline. The cardiac silhouette is normal in size. The osseous structures demonstrate no acute abnormality.              Medical Decision Making:   Initial Assessment:   Emergent evaluation of 89-year-old female with history of dementia, hypertension, osteoporosis, and renal failure who presents the emergency department with cough and shortness of breath.  Patient is afebrile and nontoxic  appearing.  Patient is hemodynamically stable.  On lung auscultation, there is coarse breath sounds in the right lower lung fields.  No lower extremity edema.  Patient is nonverbal, but with daughter's complaint, I am concerned for flu, pneumonia, viral URI, congestive heart failure.  Labs and chest x-ray will be obtained.  Clinical Tests:   Lab Tests: Ordered and Reviewed  Radiological Study: Ordered and Reviewed  ED Management:  8:35 PM  Real no leukocytosis.  Slight hyperkalemia with renal insufficiency.  Patient has baseline renal insufficiency, but I am unsure of baseline creatinine with no recent labs to compare.  Positive flu.  BNP is elevated at 608.  Patient will be admitted to the hospital for observation.  Case is discussed with Sheryl Galo PA-C.  Other:   I have discussed this case with another health care provider.       <> Summary of the Discussion: Dr. Ham                   ED Course     Clinical Impression:   The primary encounter diagnosis was Acute congestive heart failure, unspecified congestive heart failure type. Diagnoses of Cough, Flu, and Hyperkalemia were also pertinent to this visit.          Krystina Finch PA-C  01/30/17 2037

## 2017-01-31 NOTE — PLAN OF CARE
Problem: Patient Care Overview  Goal: Plan of Care Review  Outcome: Ongoing (interventions implemented as appropriate)  Explained to patient and family about being on droplet precautions. Everyone verbalized understanding of instructions. Bolus currently on hold due to patient's blood pressure 193/95. Hydralazine 10 mg IV push was given per MD order. Blood pressure approxiamately a hour later 193/88. Lisinopril 20 mg oral was givenper MD order. Currently normal sinus on telemetry. Will continue to monitor patient.

## 2017-01-31 NOTE — ASSESSMENT & PLAN NOTE
- Followed By Dr. Arvizu   - Consulted [facility]   - BUN/Cr of 72/2.6   - ?LLOYD on CKD? No recent labs available to substantiate

## 2017-01-31 NOTE — ASSESSMENT & PLAN NOTE
- Specimen mildly hemolyzed  - EKG without significant changes  - Gentle fluid hydration  - monitor with AM labs

## 2017-01-31 NOTE — SUBJECTIVE & OBJECTIVE
Past Medical History   Diagnosis Date    Dementia 11/2/2016     2012: Diagnosed with Alzheimer's Dementia.    Hypertension     Osteoporosis     Renal disorder        Past Surgical History   Procedure Laterality Date    Parathyroid removal         Review of patient's allergies indicates:   Allergen Reactions    Penicillins      Doesn't remember it has been years       No current facility-administered medications on file prior to encounter.      Current Outpatient Prescriptions on File Prior to Encounter   Medication Sig    acetaminophen (TYLENOL) 500 mg Cap     amlodipine (NORVASC) 2.5 MG tablet Take 1 tablet (2.5 mg total) by mouth once daily.    aspirin (ECOTRIN) 81 MG EC tablet Take 1 tablet (81 mg total) by mouth once daily.    AVASTIN 25 mg/mL injection     donepezil (ARICEPT) 10 MG tablet Take 10 mg by mouth once daily.     famciclovir (FAMVIR) 250 MG Tab     lisinopril (PRINIVIL,ZESTRIL) 20 MG tablet Take 20 mg by mouth once daily.     SENSIPAR 90 mg Tab Take 90 mg by mouth once daily.     calcitRIOL (ROCALTROL) 0.25 MCG Cap Take 0.25 mcg by mouth once daily.     oxycodone-acetaminophen (PERCOCET) 5-325 mg per tablet Take 1 tablet by mouth every 4 (four) hours as needed for Pain.    polyethylene glycol 3350 8.5 gram PwPk     PREVNAR 13, PF, 0.5 mL Syrg     PROLIA 60 mg/mL Syrg Inject 60 mg into the skin every 6 (six) months.     [DISCONTINUED] FLUVIRIN 3723-1496 45 mcg (15 mcg x 3)/0.5 mL Susp     [DISCONTINUED] levothyroxine (SYNTHROID) 125 MCG tablet Take 150 mcg by mouth once daily.      Family History     None        Social History Main Topics    Smoking status: Never Smoker    Smokeless tobacco: Never Used    Alcohol use No    Drug use: No    Sexual activity: No     Review of Systems   Constitutional: Positive for appetite change (decreased) and fatigue (increased need for sleep). Negative for activity change, chills, fever and unexpected weight change.   HENT: Positive for  rhinorrhea. Negative for congestion, ear pain, postnasal drip, sinus pressure, sore throat and trouble swallowing.    Respiratory: Positive for cough and shortness of breath (when sleeping only). Negative for wheezing.    Cardiovascular: Negative for chest pain.   Gastrointestinal: Negative for abdominal pain, constipation, diarrhea, nausea and vomiting.   Genitourinary: Negative for decreased urine volume and dysuria.   Musculoskeletal: Negative for back pain, gait problem, joint swelling and neck pain.   Skin: Negative for pallor, rash and wound.   Neurological: Positive for speech difficulty (patient responds minimally). Negative for tremors, seizures, syncope, facial asymmetry and weakness.   Hematological: Bruises/bleeds easily.     Objective:     Vital Signs (Most Recent):  Temp: 98 °F (36.7 °C) (01/30/17 2148)  Pulse: 84 (01/30/17 2148)  Resp: (!) 22 (01/30/17 2148)  BP: (!) 174/77 (01/30/17 2111)  SpO2: 98 % (01/30/17 2148) Vital Signs (24h Range):  Temp:  [98 °F (36.7 °C)-98.6 °F (37 °C)] 98 °F (36.7 °C)  Pulse:  [82-99] 84  Resp:  [17-22] 22  SpO2:  [98 %-99 %] 98 %  BP: (142-174)/(72-77) 174/77     Weight: 60.4 kg (133 lb 2.5 oz)  Body mass index is 22.86 kg/(m^2).    Physical Exam   Constitutional: She is oriented to person, place, and time. She appears well-developed and well-nourished. No distress.   Thin female, well-dressed, underweight, largely non-verbal.    HENT:   Head: Normocephalic and atraumatic.   Right Ear: External ear normal.   Left Ear: External ear normal.   Mouth/Throat: Oropharynx is clear and moist. No oropharyngeal exudate.   Eyes: Conjunctivae and EOM are normal. Pupils are equal, round, and reactive to light. No scleral icterus.   Neck: Normal range of motion. Neck supple. No tracheal deviation present.   Cardiovascular: Normal rate, regular rhythm, normal heart sounds and intact distal pulses.  Exam reveals no gallop and no friction rub.    No murmur heard.  2+ distal radial/DT/PT  pulses. No carotid bruits. No dependant edema.    Pulmonary/Chest: Effort normal and breath sounds normal. No stridor. No respiratory distress. She has no wheezes. She has no rales. She exhibits no tenderness.   Poor inspiratory effort during exam.    Abdominal: Soft. Bowel sounds are normal. She exhibits no distension. There is no tenderness.   Musculoskeletal: Normal range of motion. She exhibits no deformity.   Neurological: She is alert and oriented to person, place, and time. No cranial nerve deficit. She exhibits normal muscle tone.   Skin: Skin is warm and dry. No rash noted. She is not diaphoretic. No erythema. No pallor.   Psychiatric: She has a normal mood and affect. Her behavior is normal. Judgment and thought content normal.   Nursing note and vitals reviewed.       Significant Labs:   BMP:   Recent Labs  Lab 01/30/17  1850   GLU 88   *   K 5.5*   *   CO2 22*   BUN 72*   CREATININE 2.6*   CALCIUM 8.1*     CBC:   Recent Labs  Lab 01/30/17  1850   WBC 9.36   HGB 13.0   HCT 39.8        CMP:   Recent Labs  Lab 01/30/17  1850   *   K 5.5*   *   CO2 22*   GLU 88   BUN 72*   CREATININE 2.6*   CALCIUM 8.1*   PROT 7.5   ALBUMIN 3.6   BILITOT 0.4   ALKPHOS 68   AST 34   ALT 39   ANIONGAP 13   EGFRNONAA 16*     All pertinent labs within the past 24 hours have been reviewed.    Significant Imaging: CXR: I have reviewed all pertinent results/findings within the past 24 hours and my personal findings are:  No acute cardiopulmonary process.   I have reviewed all pertinent imaging results/findings within the past 24 hours.

## 2017-01-31 NOTE — PT/OT/SLP EVAL
Occupational Therapy  Evaluation    Mary Singleton   MRN: [id number]   Admitting Diagnosis: Influenza A    OT Date of Treatment: 01/31/17   OT Start Time: 1522  OT Stop Time: 1549  OT Total Time (min): 27 min    Billable Minutes:  Evaluation 15  Therapeutic Activity 12   Please see flow sheet for G-codes.    Diagnosis: Influenza A   Chart reviewed, including MD OLSEN, and PMCLARENCEX.    Past Medical History   Diagnosis Date    CKD (chronic kidney disease), stage IV     Dementia 11/2/2016 2012: Diagnosed with Alzheimer's Dementia.    Hypertension     Osteoporosis       Past Surgical History   Procedure Laterality Date    Parathyroid removal         Referring physician: vangie  Date referred to OT: 1/31/17    General Precautions: Standard, fall  Orthopedic Precautions: N/A  Braces: N/A    Do you have any cultural, spiritual, Orthodoxy conflicts, given your current situation?: none reported     Patient History:  Patient lives alone in one story home with 1 step to enter. Daughter lives 2 blocks away, and supports by providing meals, groceries. Daughter supposed to supervise with bathing, with patient calling her prior, however, per report, patient doesn't call and daughter will find her in the shower. Patient has walk in shower, and handicap toilet, both with grabbars. Patient uses rollator, but will put to the side to attend to activity. Recently went on a cruise with her daughter. Spouse passed away about 2 years ago, with whom she would argue about everyting, and daughter in law reports patient argues with all family members constantly. Patient's grandson will come at night and put her in bed, and help with taking out hearing aids  Equipment Currently Used at Home: rollator, raised toilet, grab bar, 3-in-1 commode    Prior level of function:   Bed Mobility/Transfers: independent  Grooming: independent  Bathing: needs device and assist  Upper Body Dressing: independent  Lower Body Dressing:  independent  Toileting: needs device  Home Management Skills: needs assist  Homemaking Responsibilities: No  Driving License: No  Mode of Transportation: Family     Dominant hand: right    Subjective:  Communicated with RN prior to session.    Chief Complaint: I feel sleepy  Patient/Family stated goals: going home.     Pain Ratin/10  Pain Rating Post-Intervention: 0/10    Objective:  Patient found with: telemetry, bed alarm.  Assessment modified due to patient's decreased cognitive status, and new weakness.    Cognitive Exam:  Oriented to: Person  Follows Commands/attention: Easily distracted and unable to follow one step commands consistently  Communication: minimal verbalizations during session. Family member reports that patients argues with family constantly.  Memory:  Impaired STM and Poor immediate recall  Safety awareness/insight to disability: impaired  Coping skills/emotional control: Appropriate to situation    Visual/perceptual:  Has (B)hearing aids    Physical Exam:  Postural examination/scapula alignment: Rounded shoulder, Head forward and Kyphosis  Skin integrity: Bruising of (B)forearms.  Edema: None noted (B)UE    Sensation:    Appears Intact    Upper Extremity Range of Motion:  Right Upper Extremity: WFL  Left Upper Extremity: Deficits: decreased (L) shoulder ROM due to fracture in past. Patient with abnormal movement pattern, involving minimal AROM of shoulder.. ROM distally WFL. Not allowing AAROM, patient guarding (L)UE, but denied pain.    Upper Extremity Strength:  Right Upper Extremity: 3/5  Left Upper Extremity: 2/5 at shoulder, 3/5 distally   Strength: 3-/5    Fine motor coordination:   Intact    Functional Mobility:  Bed Mobility:  Rolling/Turning to Left: Independent  Rolling/Turning Right: Independent  Scooting/Bridging: Modified Independent  Supine to Sit: Independent  Sit to Supine: Independent    Transfers:  Sit <> Stand Assistance: Stand By Assistance  Sit <> Stand Assistive  "Device: Rolling Walker  Toilet Transfer Technique: Stand Pivot  Toilet Transfer Assistance: Supervision  Toilet Transfer Assistive Device: Rolling Walker    Functional Ambulation: SBA with RW with 1 LOB needing CGA to recover. Patient with tendency to be inconsistent in keeping RW with her, and shoves DME to the side to turn and attend to another task.    Activities of Daily Living:  Feeding Level of Assistance: Activity did not occur  UE Dressing Level of Assistance: Minimum assistance  LE Dressing Level of Assistance: Moderate assistance  Grooming Position: Standing at sink  Grooming Level of Assistance: Minimum assistance (to put soap on hands, get paper towel, due to unfamiliarity and family stating "she normally doesn't use soap when she uses the bathroom".)  Toileting Where Assessed: Toilet  Toileting Level of Assistance: Stand by assistance (with use of grabbar, on standard commode)    Balance:   Static Sit: FAIR+: Able to take MINIMAL challenges from all directions  Dynamic Sit: GOOD: Maintains balance through MODERATE excursions of active trunk movement  Static Stand: FAIR+: Takes MINIMAL challenges from all directions  Dynamic stand: FAIR+: Needs CLOSE SUPERVISION during gait and is able to right self with minor LOB   Lateral LOB with mobility in room using RW.    Therapeutic Activities and Exercises:  Discussed with family member importance to decreasing stress, clutter, to allow for improved task performance. Patient with flat affect during session, but had a chuckle when joke made by therapist.     AM-PAC 6 CLICK ADL  How much help from another person does this patient currently need?  1 = Unable, Total/Dependent Assistance  2 = A lot, Maximum/Moderate Assistance  3 = A little, Minimum/Contact Guard/Supervision  4 = None, Modified [place]/Independent    Putting on and taking off regular lower body clothing? : 2  Bathing (including washing, rinsing, drying)?: 3  Toileting, which includes using " "toilet, bedpan, or urinal? : 3  Putting on and taking off regular upper body clothing?: 3  Taking care of personal grooming such as brushing teeth?: 3  Eating meals?: 3  Total Score: 17    AM-PAC Raw Score CMS "G-Code Modifier Level of Impairment Assistance   6 % Total / Unable   7 - 9 CM 80 - 100% Maximal Assist   10 - 14 CL 60 - 80% Moderate Assist   15 - 19 CK 40 - 60% Moderate Assist   20 - 22 CJ 20 - 40% Minimal Assist   23 CI 1-20% SBA / CGA   24 CH 0% Independent/ Mod I       Patient left left sidelying with all lines intact, call button in reach and family present    Assessment:  Mary Singleton is a 89 y.o. female with a medical diagnosis of Influenza A and presents with weakness, endurance, who is willing to particpate despite feeling tired, and who's family reports patient always willing to work with therapists, but arguing constantly with family members, who would benefit from involvement in therapeutic activity to increase endurance, UE strenght, and for education of family to help decrease stress to increase (I) with task performance.    Rehab identified problem list/impairments: Rehab identified problem list/impairments: impaired cognition, impaired self care skills, impaired endurance, impaired balance, impaired functional mobilty, decreased upper extremity function    Rehab potential is all lines intact, call button in reach and Family present.    Activity tolerance: Fair    Discharge recommendations: Discharge Facility/Level Of Care Needs: home health OT, home with home health (24 hr supervision)     Barriers to discharge: Barriers to Discharge: Decreased caregiver support    Equipment recommendations: wheelchair     GOALS:   Occupational Therapy Goals        Problem: Occupational Therapy Goal    Goal Priority Disciplines Outcome Interventions   Occupational Therapy Goal     OT, PT/OT Ongoing (interventions implemented as appropriate)    Description:  Goals to be met by: 2/6/17 "     Patient will increase functional independence with ADLs by performing:    LE Dressing with Supervision.  Grooming while standing at sink with Supervision.  Maximize endurance, to be able to tolerate attending to grooming in standing x 3 tasks.  .                PLAN:  Patient to be seen 4 x/week to address the above listed problems via self-care/home management, therapeutic activities, therapeutic exercises  Plan of Care expires:  2/28/16  Plan of Care reviewed with: patient, family     AYAZ Burnett/L  01/31/2017

## 2017-01-31 NOTE — ED TRIAGE NOTES
Pt reports + productive cough w/ clear sputum  X 3 days. Pt denies fever, chills, N/V/D or chest pain.

## 2017-01-31 NOTE — ASSESSMENT & PLAN NOTE
- BNP of 608  - sats are 99% on RA  - CXR without signs of fluid overload, no dependant/distal edema

## 2017-01-31 NOTE — PLAN OF CARE
Problem: Patient Care Overview  Goal: Plan of Care Review  Outcome: Ongoing (interventions implemented as appropriate)  On room air, no PRN rx required.

## 2017-01-31 NOTE — PT/OT/SLP EVAL
Physical Therapy  Evaluation/treatment  G codes    Mary Singleton   MRN: [id number]   Admitting Diagnosis: Influenza A    PT Received On: 01/31/17  PT Start Time: 1351     PT Stop Time: 1440    PT Total Time (min): 49 min       Billable Minutes:  Evaluation 15, Therapeutic Activity 26 and Therapeutic Exercise 8    Diagnosis: Influenza A      Past Medical History   Diagnosis Date    CKD (chronic kidney disease), stage IV     Dementia 11/2/2016     2012: Diagnosed with Alzheimer's Dementia.    Hypertension     Osteoporosis       Past Surgical History   Procedure Laterality Date    Parathyroid removal         Referring physician: Sheryl AGUILAR  Date referred to PT: 1/30/17    General Precautions: Standard, fall  Orthopedic Precautions: N/A   Braces:         Do you have any cultural, spiritual, Adventist conflicts, given your current situation?: none    Patient History:  Lives With: alone  Living Arrangements: house  Home Accessibility: stairs to enter home  Number of Stairs to Enter Home: 1  Stair Railings at Home: none  Transportation Available: family or friend will provide  Living Environment Comment: Patient lives alone in one story home with 1 step to enter. Daughter lives 2 doors away, and supports by providing meals, groceries. Daughter supposed to supervise with bathing, with patient calling her prior, however, per report, patient doesn't call and daughter will find her in the shower. Patient has walk in shower, and handicap toilet, both with grabbars. Patient uses rollator, but will put to the side to attend to activity. Recently went on a cruise with her daughter. Spouse passed away about 2 years ago, with whom she would argue about everyting, and daughter in law reports patient argues with all family members constantly. Patient's grandson will come at night and put her in bed, and help with taking out hearing aids  Equipment Currently Used at Home: 3-in-1 commode, raised toilet, rollator, grab  "bar  DME owned (not currently used): none    Previous Level of Function:  Ambulation Skills: needs device  Transfer Skills: needs device  ADL Skills: needs device and assist    Subjective:  Communicated with nursing prior to session.    Chief Complaint: tired  Patient goals: go home     Pain Rating:  (doesnt rate " a little but not all the time")   Location - Side: Bilateral     Location: leg  Pain Addressed: Reposition, Distraction  Pain Rating Post-Intervention: 0/10    Objective:   Patient found with: bed alarm, peripheral IV, telemetry     Cognitive Exam:  Oriented to: Person, when asked pt where she could name but replied no to "are you at home" and then stated she was at hospital.  Could not provide name and not oriented to time or situation    Follows Commands/attention: Inattentive and Follows one-step commands  Communication: minimal verbalization and only to questions.  falt affect  Safety awareness/insight to disability: impaired    Physical Exam:  Postural examination/scapula alignment: Rounded shoulder, Head forward and Kyphosis    Skin integrity: Visible skin intact  Edema: None noted     Sensation:   Denies paraesthesias in BLE     Upper Extremity - see OT eval    Lower Extremity Range of Motion:  Right Lower Extremity: WFL  Left Lower Extremity: WFL    Lower Extremity Strength:  Right Lower Extremity: grossly hip 3/5, knee and ankle 4/5  Left Lower Extremity: grossly hip 3+/5, knee and ankle 4/5      Gross motor coordination: delay in timing in BLE    Functional Mobility:  Bed Mobility:  Supine to Sit: Modified Independent  Sit to Supine:  (left up in chair)    Transfers:  Sit <> Stand Assistance: Stand By Assistance (cues for hand placement)  Sit <> Stand Assistive Device: Rolling Walker    Gait:   Gait Distance: amb to bathroom and used toilet.  then amb in room 10', refused to go outside of room-"tired".  pt to chair to sit up, family present , cues for correct use of RW  Assistance 1: Contact " Guard Assistance  Gait Assistive Device: Rolling walker  Gait Pattern: reciprocal  Gait Deviation(s): decreased bryan, decreased step length, decreased stride length    Stairs:  Not assessed     Balance:   Static Sit: FAIR+: Able to take MINIMAL challenges from all directions  Dynamic Sit: FAIR+: Maintains balance through MINIMAL excursions of active trunk motion  Static Stand: FAIR+: Takes MINIMAL challenges from all directions  Dynamic stand: FAIR+: Needs CLOSE SUPERVISION during gait and is able to right self with minor LOB    Therapeutic Activities and Exercises:  Pt eval.  sats on RA 94-96% despite mildly labored breathing.  amb with RW to bathroom and in room. Pt to bs chair and left up in chair with family present.  performed LE ex in sitting-AP, hip flex and LAQ 20 resp B LE   AM-PAC 6 CLICK MOBILITY  How much help from another person does this patient currently need?   1 = Unable, Total/Dependent Assistance  2 = A lot, Maximum/Moderate Assistance  3 = A little, Minimum/Contact Guard/Supervision  4 = None, Modified [place]/Independent    Turning over in bed (including adjusting bedclothes, sheets and blankets)?: 4  Sitting down on and standing up from a chair with arms (e.g., wheelchair, bedside commode, etc.): 3  Moving from lying on back to sitting on the side of the bed?: 4  Moving to and from a bed to a chair (including a wheelchair)?: 3  Need to walk in hospital room?: 3  Climbing 3-5 steps with a railing?: 3  Total Score: 20     AM-PAC Raw Score CMS G-Code Modifier Level of Impairment Assistance   6 % Total / Unable   7 - 9 CM 80 - 100% Maximal Assist   10 - 14 CL 60 - 80% Moderate Assist   15 - 19 CK 40 - 60% Moderate Assist   20 - 22 CJ 20 - 40% Minimal Assist   23 CI 1-20% SBA / CGA   24 CH 0% Independent/ Mod I     Patient left up in chair with all lines intact, call button in reach, nurse notified and family present.    Assessment:   Mary Singleton is a 89 y.o. female with a  medical diagnosis of Influenza A and presents with Rehab identified problem list/impairments: Rehab identified problem list/impairments: impaired cognition, decreased safety awareness, impaired endurance, weakness, impaired functional mobilty, impaired balance, decreased lower extremity function.  Pt lives alone but rec 24 hr S given impaired cognition and decreased safety awareness.  Benefit from therapy to maximize functional mobility and endurance to decrease burden of care.  Despite fatigue pt did participate with therapy.      Rehab potential is good.    Activity tolerance: Fair    Discharge recommendations: Discharge Facility/Level Of Care Needs: home with home health, home health OT, home health PT (24 hr S)     Barriers to discharge: Barriers to Discharge: Decreased caregiver support    Equipment recommendations: Equipment Needed After Discharge: wheelchair     GOALS:   Physical Therapy Goals        Problem: Physical Therapy Goal    Goal Priority Disciplines Outcome Goal Variances Interventions   Physical Therapy Goal     PT/OT, PT Ongoing (interventions implemented as appropriate)     Description:  Goals to be met by: 17     Patient will increase functional independence with mobility by performin. Sit to stand transfer with S  2. Gait  x 150 feet with S using Rolling Walker.   3. Ascend/Descend 6 inch curb step with Contact Guard Assistance using Rolling Walker.              Problem: Physical Therapy Goal    Goal Priority Disciplines Outcome Goal Variances Interventions   Physical Therapy Goal     PT/OT, PT                PLAN:    Patient to be seen 6 x/week to address the above listed problems via gait training, therapeutic activities, therapeutic exercises  Plan of Care expires: 17  Plan of Care reviewed with: patient, family    Functional Assessment Tool Used: AMPAC  Score: 20  Functional Limitation: Mobility: Walking and moving around  Mobility: Walking and Moving Around Current Status  (): CJ  Mobility: Walking and Moving Around Goal Status (): BRYON Villanueva, PT  01/31/2017

## 2017-01-31 NOTE — CONSULTS
Consult Note  Nephrology    Consult Requested By: Juanpablo Orr MD  Reason for Consult: CKD IV    SUBJECTIVE:     History of Present Illness:  Patient is a 89 y.o. female presents with cough, SOB, fatigue when returned from recent cruise and found to have influenza A.  Patient known to us in clinic and follows Dr. Arvizu with chronic kidney disease 4.  Patient has slow progression of her renal disease and has a arm AV fistula in preparation for dialysis when needed.  Admitted for further evaluation.  Consulted for concurrent medical management.      ER/Epic noted reviewed.  Pt was given Lasix in ED for elevated BNP.  Renal fxn essentially stable and no need for HD at this time.      Patient seen and examined.  Discussed with daughter and Dr. Orr at bedside in detail.  Patient complains of leg pains but denies chest pain, shortness of breath, nausea, vomiting, diarrhea, fever, chills.    Patient has had a steady progression of dementia and has been difficult to handle at home according to her daughter.    Past Medical History   Diagnosis Date    CKD (chronic kidney disease), stage IV     Dementia 11/2/2016     2012: Diagnosed with Alzheimer's Dementia.    Hypertension     Osteoporosis      Past Surgical History   Procedure Laterality Date    Parathyroid removal       History reviewed. No pertinent family history.  Social History   Substance Use Topics    Smoking status: Never Smoker    Smokeless tobacco: Never Used    Alcohol use No       Review of patient's allergies indicates:   Allergen Reactions    Penicillins      Doesn't remember it has been years        Review of Systems:    See HPI    OBJECTIVE:     Vital Signs (Most Recent)  Temp: 98.3 °F (36.8 °C) (01/31/17 0702)  Pulse: 81 (01/31/17 1000)  Resp: 18 (01/31/17 0830)  BP: (!) 198/83 (01/31/17 0702)  SpO2: 96 % (01/31/17 0830)    Vital Signs Range (Last 24H):  Temp:  [98 °F (36.7 °C)-98.6 °F (37 °C)]   Pulse:  [81-99]   Resp:  [17-22]   BP:  (142-198)/(72-95)   SpO2:  [95 %-99 %]     Physical Exam:  General appearance: elderly, demented.  Eyes:  Conjunctivae/corneas clear. PERRL.  Lungs: Normal respiratory effort,   clear to auscultation bilaterally   Heart: Regular rate and rhythm, S1, S2 normal, no murmur, rub or julio c.  Abdomen: Soft, non-tender non-distended; bowel sounds normal; no masses,  no organomegaly  Extremities: No cyanosis or clubbing. No edema.    Skin: Skin color, texture, turgor normal. No rashes or lesions  Neurologic: Normal strength and tone. No focal numbness or weakness   Dementia  Right arm AVF      Laboratory:    Recent Labs  Lab 01/31/17  0459   WBC 9.89   RBC 4.08   HGB 12.6   HCT 38.3      MCV 94   MCH 30.9   MCHC 32.9     BMP:   Recent Labs  Lab 01/31/17 0459   *   *   K 4.8   *   CO2 19*   BUN 71*   CREATININE 2.6*   CALCIUM 8.0*     Lab Results   Component Value Date    CALCIUM 8.0 (L) 01/31/2017    PHOS 2.6 (L) 04/04/2010         Diagnostic Results:    reviewed    ASSESSMENT/PLAN:     1. Acute on chronic chronic kidney disease 4 likely secondary to acute illness and influenza with poor oral intake over previous week (N 18.4, N 17.9): Hold ACE inhibitor and diuretics.  We'll give small volume 250 cc bolus now.  Patient has right arm AV fistula in place but no need to start dialysis at this time.  We'll follow urine output and renal panel.Renally dose meds, avoid nephrotoxins, and monitor I/O's closely.  2. Influenza A (J 10.1): Renally dosed Tamiflu.  Continue with symptom management.  3. HTN(I 12.9): Hold ACE inhibitor and will increase calcium channel blocker for now.  4. Hyperparathyroidism (N 25.81): Hold Sensipar for now since calcium low.  Continue with calcitriol as now  5. Worsening dementia (F03.90): Patient has had a rapid decline in her mental status despite Aricept.  May need to discuss DO NOT RESUSCITATE and potential hospice.  6. Hypothyroidism(E03.9): Continue with  Synthroid      Thanks for consultation  See above orders and recommendations  We'll follow with you

## 2017-01-31 NOTE — PLAN OF CARE
Problem: Patient Care Overview  Goal: Plan of Care Review  Outcome: Ongoing (interventions implemented as appropriate)  Plan of care reviewed with patient.  Patient hypertensive this AM, BP has lowered throughout shift, patient normotensive now.  VSS stable at present.  No complaints of pain this shift.  250 mL bolus given to patient.  Telemetry monitor maintained.  Patient forgetful to call when needing assistance to bathroom, bed alarm activated.  Daughter in law at bedside.  No needs at this time.  Bed alarm activated.  Bed lowered and locked.  Call bell within reach.  Will continue to monitor.

## 2017-01-31 NOTE — ED NOTES
Two patient identifiers have been checked and are correct.    Pt denies chest pain or SOB at this time.   Appearance: Pt awake, alert & oriented to person, place & time. Pt in no acute distress at present time. Pt is clean and well groomed with clothes appropriately fastened.  Delayed responses noted, pt appears [place].   Skin: Skin warm, dry & intact. Color consistent with ethnicity. Mucous membranes moist. No breakdown or brusing noted.   Musculoskeletal: Patient moving all extremities well, no obvious swelling or deformities noted.   Respiratory: Respirations spontaneous, even, and non-labored. Visible chest rise noted. Airway is open and patent. No accessory muscle use noted.   Neurologic: Sensation is intact. Speech is clear and appropriate. Eyes open spontaneously, behavior appropriate to situation, follows commands, facial expression symmetrical, bilateral hand grasp equal and even, purposeful motor response noted.  Cardiac: All peripheral pulses present. No Bilateral lower extremity edema. Cap refill is <3 seconds.  Abdomen: Abdomen soft, non-tender to palpation.   : Pt reports no dysuria or hematuria.

## 2017-01-31 NOTE — PLAN OF CARE
Problem: Patient Care Overview  Goal: Plan of Care Review  Outcome: Ongoing (interventions implemented as appropriate)  Patient received on RA SAT 96%, no PRN TX needed at this time. Will continue to monitor.

## 2017-01-31 NOTE — PLAN OF CARE
Problem: Physical Therapy Goal  Goal: Physical Therapy Goal  Goals to be met by: 17     Patient will increase functional independence with mobility by performin. Sit to stand transfer with S  2. Gait x 150 feet with S using Rolling Walker.   3. Ascend/Descend 6 inch curb step with Contact Guard Assistance using Rolling Walker.  Outcome: Ongoing (interventions implemented as appropriate)  Pt eval.  sats on RA 94-96% despite mildly labored breathing.  amb with RW to bathroom and in room. Pt to bs chair and left up in chair with family present  REC;  Pt should not live alone, needs 24 hr S for safety.  Home with HH

## 2017-01-31 NOTE — ASSESSMENT & PLAN NOTE
- Continue Amlodipine   - Hold Lisinopril 2/2 renal function  - Will continue to monitor blood pressure

## 2017-01-31 NOTE — PLAN OF CARE
Discharge Note     CM met with pt's daughter, Dai Torres, [phone], at bedside.  Daughter states pt lives alone, but daughter lives 2 doors down, and is very involved with care.  Pt and daughter recently returned from a cruise. Pt currently has HH with [facility], [phone].  CM to follow for arrangements.

## 2017-01-31 NOTE — H&P
[facility] Hospital Medicine  History & Physical    Patient Name: Mary Singleton  MRN: [id number]  Admission Date: 1/30/2017  Attending Physician: Rene Smith MD   Primary Care Provider: Susana Arvizu MD         Patient information was obtained from relative(s) and ER records.     Subjective:     Principal Problem:Influenza A    Chief Complaint:  Cough, SOB, fatigue     HPI: Ms. Singleton is a 89 y.o. female with PMH of CAD (S/p Left CEA in 2002), Alzheimer's Dementia (largely non-verbal), HTN, CKD Stage IV (Right arm fistula, sees Dr. Arvizu), Hypothyroidism, and hyperparathyroidism, who presents with 6 day of non-productive cough associated with rhinitis, shortness of breath with sleeping and increased need for sleep. Associated symptoms include decreased appetite, and decreased verbal responsiveness from baseline. She is accompanied by her daughter, her primary caregiver, who provides most of the history. Her daughter endorses they have returned yesterday from a cruise to [facility], and the [facility]. She states her mother has had a Flu and Pneumonia vaccine this year, and is up to date on all other vaccinations. She ambulates at home with the assistance of a walker. Denies fever, further URI symptoms, chest pain, abdominal pain, N/V/D, sick contacts.           Past Medical History   Diagnosis Date    Dementia 11/2/2016     2012: Diagnosed with Alzheimer's Dementia.    Hypertension     Osteoporosis     Renal disorder        Past Surgical History   Procedure Laterality Date    Parathyroid removal         Review of patient's allergies indicates:   Allergen Reactions    Penicillins      Doesn't remember it has been years       No current facility-administered medications on file prior to encounter.      Current Outpatient Prescriptions on File Prior to Encounter   Medication Sig    acetaminophen (TYLENOL) 500 mg Cap     amlodipine (NORVASC) 2.5 MG tablet Take 1 tablet (2.5 mg  total) by mouth once daily.    aspirin (ECOTRIN) 81 MG EC tablet Take 1 tablet (81 mg total) by mouth once daily.    AVASTIN 25 mg/mL injection     donepezil (ARICEPT) 10 MG tablet Take 10 mg by mouth once daily.     famciclovir (FAMVIR) 250 MG Tab     lisinopril (PRINIVIL,ZESTRIL) 20 MG tablet Take 20 mg by mouth once daily.     SENSIPAR 90 mg Tab Take 90 mg by mouth once daily.     calcitRIOL (ROCALTROL) 0.25 MCG Cap Take 0.25 mcg by mouth once daily.     oxycodone-acetaminophen (PERCOCET) 5-325 mg per tablet Take 1 tablet by mouth every 4 (four) hours as needed for Pain.    polyethylene glycol 3350 8.5 gram PwPk     PREVNAR 13, PF, 0.5 mL Syrg     PROLIA 60 mg/mL Syrg Inject 60 mg into the skin every 6 (six) months.     [DISCONTINUED] FLUVIRIN 2296-9522 45 mcg (15 mcg x 3)/0.5 mL Susp     [DISCONTINUED] levothyroxine (SYNTHROID) 125 MCG tablet Take 150 mcg by mouth once daily.      Family History     None        Social History Main Topics    Smoking status: Never Smoker    Smokeless tobacco: Never Used    Alcohol use No    Drug use: No    Sexual activity: No     Review of Systems   Constitutional: Positive for appetite change (decreased) and fatigue (increased need for sleep). Negative for activity change, chills, fever and unexpected weight change.   HENT: Positive for rhinorrhea. Negative for congestion, ear pain, postnasal drip, sinus pressure, sore throat and trouble swallowing.    Respiratory: Positive for cough and shortness of breath (when sleeping only). Negative for wheezing.    Cardiovascular: Negative for chest pain.   Gastrointestinal: Negative for abdominal pain, constipation, diarrhea, nausea and vomiting.   Genitourinary: Negative for decreased urine volume and dysuria.   Musculoskeletal: Negative for back pain, gait problem, joint swelling and neck pain.   Skin: Negative for pallor, rash and wound.   Neurological: Positive for speech difficulty (patient responds minimally).  Negative for tremors, seizures, syncope, facial asymmetry and weakness.   Hematological: Bruises/bleeds easily.     Objective:     Vital Signs (Most Recent):  Temp: 98 °F (36.7 °C) (01/30/17 2148)  Pulse: 84 (01/30/17 2148)  Resp: (!) 22 (01/30/17 2148)  BP: (!) 174/77 (01/30/17 2111)  SpO2: 98 % (01/30/17 2148) Vital Signs (24h Range):  Temp:  [98 °F (36.7 °C)-98.6 °F (37 °C)] 98 °F (36.7 °C)  Pulse:  [82-99] 84  Resp:  [17-22] 22  SpO2:  [98 %-99 %] 98 %  BP: (142-174)/(72-77) 174/77     Weight: 60.4 kg (133 lb 2.5 oz)  Body mass index is 22.86 kg/(m^2).    Physical Exam   Constitutional: She is oriented to person, place, and time. She appears well-developed and well-nourished. No distress.   Thin female, well-dressed, underweight, largely non-verbal.    HENT:   Head: Normocephalic and atraumatic.   Right Ear: External ear normal.   Left Ear: External ear normal.   Mouth/Throat: Oropharynx is clear and moist. No oropharyngeal exudate.   Eyes: Conjunctivae and EOM are normal. Pupils are equal, round, and reactive to light. No scleral icterus.   Neck: Normal range of motion. Neck supple. No tracheal deviation present.   Cardiovascular: Normal rate, regular rhythm, normal heart sounds and intact distal pulses.  Exam reveals no gallop and no friction rub.    No murmur heard.  2+ distal radial/DT/PT pulses. No carotid bruits. No dependant edema.    Pulmonary/Chest: Effort normal and breath sounds normal. No stridor. No respiratory distress. She has no wheezes. She has no rales. She exhibits no tenderness.   Poor inspiratory effort during exam.    Abdominal: Soft. Bowel sounds are normal. She exhibits no distension. There is no tenderness.   Musculoskeletal: Normal range of motion. She exhibits no deformity.   Neurological: She is alert and oriented to person, place, and time. No cranial nerve deficit. She exhibits normal muscle tone.   Skin: Skin is warm and dry. No rash noted. She is not diaphoretic. No erythema. No  pallor.   Psychiatric: She has a normal mood and affect. Her behavior is normal. Judgment and thought content normal.   Nursing note and vitals reviewed.       Significant Labs:   BMP:   Recent Labs  Lab 01/30/17  1850   GLU 88   *   K 5.5*   *   CO2 22*   BUN 72*   CREATININE 2.6*   CALCIUM 8.1*     CBC:   Recent Labs  Lab 01/30/17  1850   WBC 9.36   HGB 13.0   HCT 39.8        CMP:   Recent Labs  Lab 01/30/17  1850   *   K 5.5*   *   CO2 22*   GLU 88   BUN 72*   CREATININE 2.6*   CALCIUM 8.1*   PROT 7.5   ALBUMIN 3.6   BILITOT 0.4   ALKPHOS 68   AST 34   ALT 39   ANIONGAP 13   EGFRNONAA 16*     All pertinent labs within the past 24 hours have been reviewed.    Significant Imaging: CXR: I have reviewed all pertinent results/findings within the past 24 hours and my personal findings are:  No acute cardiopulmonary process.   I have reviewed all pertinent imaging results/findings within the past 24 hours.    Assessment/Plan:     * Influenza A  - Droplet isolation  - Tamiflu begun at renal dosing   - Robitussin & Nebs PRN       Hypertension  - Continue Amlodipine   - Hold Lisinopril 2/2 renal function  - Will continue to monitor blood pressure      Chronic kidney disease, stage IV (severe)  - Followed By Dr. Arvizu   - Consulted [facility]   - BUN/Cr of 72/2.6   - ?LLOYD on CKD? No recent labs available to substantiate        Hypothyroidism  - Continue Synthroid      Acute congestive heart failure  - BNP of 608  - sats are 99% on RA  - CXR without signs of fluid overload, no dependant/distal edema        Hyperkalemia  - Specimen mildly hemolyzed  - EKG without significant changes  - Gentle fluid hydration  - monitor with AM labs        Hypochloremia  - very mild, will monitor with AM labs      Hypocalcemia  - Likely 2/2 h/o hyperparathyroidism  - s/p partial parathyroidectomy  - recent change in dose of medications      VTE Risk Mitigation         Ordered     heparin (porcine) injection  5,000 Units  Every 8 hours     Route:  Subcutaneous        01/30/17 2051     Medium Risk of VTE  Once      01/30/17 2051     Place BARBARA avelare  Until discontinued      01/30/17 2038        Sheryl Galo PA-C  Department of Hospital Medicine   [facility]

## 2017-02-01 LAB
ANION GAP SERPL CALC-SCNC: 14 MMOL/L
BASOPHILS # BLD AUTO: ABNORMAL K/UL
BASOPHILS NFR BLD: 0 %
BUN SERPL-MCNC: 78 MG/DL
CALCIUM SERPL-MCNC: 7.7 MG/DL
CHLORIDE SERPL-SCNC: 111 MMOL/L
CO2 SERPL-SCNC: 19 MMOL/L
CREAT SERPL-MCNC: 3 MG/DL
DIFFERENTIAL METHOD: ABNORMAL
EOSINOPHIL # BLD AUTO: ABNORMAL K/UL
EOSINOPHIL NFR BLD: 0 %
ERYTHROCYTE [DISTWIDTH] IN BLOOD BY AUTOMATED COUNT: 15.2 %
EST. GFR  (AFRICAN AMERICAN): 15 ML/MIN/1.73 M^2
EST. GFR  (NON AFRICAN AMERICAN): 13 ML/MIN/1.73 M^2
GLUCOSE SERPL-MCNC: 81 MG/DL
HCT VFR BLD AUTO: 37 %
HGB BLD-MCNC: 12.3 G/DL
LYMPHOCYTES # BLD AUTO: ABNORMAL K/UL
LYMPHOCYTES NFR BLD: 16 %
MAGNESIUM SERPL-MCNC: 1.9 MG/DL
MCH RBC QN AUTO: 31.1 PG
MCHC RBC AUTO-ENTMCNC: 33.2 %
MCV RBC AUTO: 93 FL
METAMYELOCYTES NFR BLD MANUAL: 1 %
MONOCYTES # BLD AUTO: ABNORMAL K/UL
MONOCYTES NFR BLD: 11 %
NEUTROPHILS NFR BLD: 66 %
NEUTS BAND NFR BLD MANUAL: 6 %
PHOSPHATE SERPL-MCNC: 5.1 MG/DL
PLATELET # BLD AUTO: 207 K/UL
PLATELET BLD QL SMEAR: ABNORMAL
PMV BLD AUTO: 11.2 FL
POTASSIUM SERPL-SCNC: 4.6 MMOL/L
RBC # BLD AUTO: 3.96 M/UL
SODIUM SERPL-SCNC: 144 MMOL/L
WBC # BLD AUTO: 8.91 K/UL

## 2017-02-01 PROCEDURE — 25000242 PHARM REV CODE 250 ALT 637 W/ HCPCS: Performed by: INTERNAL MEDICINE

## 2017-02-01 PROCEDURE — 25000003 PHARM REV CODE 250: Performed by: NURSE PRACTITIONER

## 2017-02-01 PROCEDURE — 99225 PR SUBSEQUENT OBSERVATION CARE,LEVEL II: CPT | Mod: ,,, | Performed by: HOSPITALIST

## 2017-02-01 PROCEDURE — 36415 COLL VENOUS BLD VENIPUNCTURE: CPT

## 2017-02-01 PROCEDURE — 97530 THERAPEUTIC ACTIVITIES: CPT

## 2017-02-01 PROCEDURE — 85027 COMPLETE CBC AUTOMATED: CPT

## 2017-02-01 PROCEDURE — 97116 GAIT TRAINING THERAPY: CPT

## 2017-02-01 PROCEDURE — 80048 BASIC METABOLIC PNL TOTAL CA: CPT

## 2017-02-01 PROCEDURE — 85007 BL SMEAR W/DIFF WBC COUNT: CPT

## 2017-02-01 PROCEDURE — 83735 ASSAY OF MAGNESIUM: CPT

## 2017-02-01 PROCEDURE — 84100 ASSAY OF PHOSPHORUS: CPT

## 2017-02-01 PROCEDURE — 25000003 PHARM REV CODE 250: Performed by: PHYSICIAN ASSISTANT

## 2017-02-01 PROCEDURE — 94640 AIRWAY INHALATION TREATMENT: CPT

## 2017-02-01 PROCEDURE — G0378 HOSPITAL OBSERVATION PER HR: HCPCS

## 2017-02-01 RX ORDER — CARVEDILOL 12.5 MG/1
12.5 TABLET ORAL 2 TIMES DAILY WITH MEALS
Status: DISCONTINUED | OUTPATIENT
Start: 2017-02-01 | End: 2017-02-01

## 2017-02-01 RX ORDER — CARVEDILOL 6.25 MG/1
6.25 TABLET ORAL 2 TIMES DAILY WITH MEALS
Status: DISCONTINUED | OUTPATIENT
Start: 2017-02-02 | End: 2017-02-06 | Stop reason: HOSPADM

## 2017-02-01 RX ORDER — AMLODIPINE BESYLATE 5 MG/1
10 TABLET ORAL DAILY
Status: DISCONTINUED | OUTPATIENT
Start: 2017-02-01 | End: 2017-02-02

## 2017-02-01 RX ADMIN — HEPARIN SODIUM 5000 UNITS: 5000 INJECTION, SOLUTION INTRAVENOUS; SUBCUTANEOUS at 05:02

## 2017-02-01 RX ADMIN — QUETIAPINE FUMARATE 25 MG: 25 TABLET, FILM COATED ORAL at 09:02

## 2017-02-01 RX ADMIN — AMLODIPINE BESYLATE 10 MG: 5 TABLET ORAL at 09:02

## 2017-02-01 RX ADMIN — CALCITRIOL 0.25 MCG: 0.25 CAPSULE, LIQUID FILLED ORAL at 09:02

## 2017-02-01 RX ADMIN — DONEPEZIL HYDROCHLORIDE 10 MG: 5 TABLET, FILM COATED ORAL at 09:02

## 2017-02-01 RX ADMIN — SODIUM CHLORIDE 250 ML: 0.45 INJECTION, SOLUTION INTRAVENOUS at 09:02

## 2017-02-01 RX ADMIN — LEVOTHYROXINE SODIUM 150 MCG: 75 TABLET ORAL at 05:02

## 2017-02-01 RX ADMIN — FAMOTIDINE 20 MG: 20 TABLET, FILM COATED ORAL at 09:02

## 2017-02-01 RX ADMIN — IPRATROPIUM BROMIDE AND ALBUTEROL SULFATE 3 ML: .5; 3 SOLUTION RESPIRATORY (INHALATION) at 07:02

## 2017-02-01 RX ADMIN — CARVEDILOL 12.5 MG: 12.5 TABLET, FILM COATED ORAL at 09:02

## 2017-02-01 RX ADMIN — FLUOXETINE 10 MG: 10 CAPSULE ORAL at 09:02

## 2017-02-01 RX ADMIN — ASPIRIN 81 MG: 81 TABLET, COATED ORAL at 09:02

## 2017-02-01 RX ADMIN — OSELTAMIVIR PHOSPHATE 30 MG: 6 POWDER, FOR SUSPENSION ORAL at 09:02

## 2017-02-01 RX ADMIN — IPRATROPIUM BROMIDE AND ALBUTEROL SULFATE 3 ML: .5; 3 SOLUTION RESPIRATORY (INHALATION) at 01:02

## 2017-02-01 RX ADMIN — HEPARIN SODIUM 5000 UNITS: 5000 INJECTION, SOLUTION INTRAVENOUS; SUBCUTANEOUS at 01:02

## 2017-02-01 RX ADMIN — HEPARIN SODIUM 5000 UNITS: 5000 INJECTION, SOLUTION INTRAVENOUS; SUBCUTANEOUS at 09:02

## 2017-02-01 RX ADMIN — STANDARDIZED SENNA CONCENTRATE AND DOCUSATE SODIUM 1 TABLET: 8.6; 5 TABLET, FILM COATED ORAL at 09:02

## 2017-02-01 NOTE — PLAN OF CARE
Problem: Patient Care Overview  Goal: Plan of Care Review  Outcome: Ongoing (interventions implemented as appropriate)              Patient rested throughout shift, free of falls/trauma or other adverse outcome.  VSS on room air and telemetry monitor in place.  No complaints of nausea, vomiting, etc.  Family at bedside.  Pt ambulates with bathroom with use of walker and standby assist and voids freely.  Bed in low position, wheels locked, bed alarm on, and call light within reach.  Will continue to monitor.

## 2017-02-01 NOTE — PLAN OF CARE
Problem: Patient Care Overview  Goal: Plan of Care Review  Outcome: Ongoing (interventions implemented as appropriate)  Rx tolerated well, no acute distress noted.

## 2017-02-01 NOTE — PT/OT/SLP PROGRESS
"Physical Therapy  Treatment    Mary Singleton   MRN: [id number]   Admitting Diagnosis: Influenza A    PT Received On: 02/01/17  PT Start Time: 1100     PT Stop Time: 1125    PT Total Time (min): 25 min       Billable Minutes:  Gait [id number] and Therapeutic Activity 10    Treatment Type: Treatment  PT/PTA: PTA     PTA Visit Number: 1       General Precautions: Standard, fall  Orthopedic Precautions: N/A   Braces: N/A    Do you have any cultural, spiritual, Protestant conflicts, given your current situation?: none    Subjective:  Communicated with nurse prior to session. Pt. Stated " I need to use the bathroom"     Pain Rating:  (patient was unable to quantifiy pain)     Objective:   Patient found with: peripheral IV, bed alarm, telemetry    Functional Mobility:  Bed Mobility:   Rolling/Turning to Left: Modified independent  Scooting/Bridging: Modified Independent  Supine to Sit: Modified Independent    Transfers:  Sit <> Stand Assistance: Stand By Assistance  Sit <> Stand Assistive Device: Rolling Walker    Gait:   Gait Distance: 110 feet with RW with mask on secondary to patient going in hallway.   Assistance 1: Contact Guard Assistance  Gait Assistive Device: Rolling walker  Gait Pattern: reciprocal  Gait Deviation(s): decreased bryan, decreased step length, decreased weight-shifting ability    Therapeutic Activities and Exercises:  Patient was independent with self cleaning but CGA for safety for balance     AM-PAC 6 CLICK MOBILITY  How much help from another person does this patient currently need?   1 = Unable, Total/Dependent Assistance  2 = A lot, Maximum/Moderate Assistance  3 = A little, Minimum/Contact Guard/Supervision  4 = None, Modified [place]/Independent    Turning over in bed (including adjusting bedclothes, sheets and blankets)?: 4  Sitting down on and standing up from a chair with arms (e.g., wheelchair, bedside commode, etc.): 3  Moving from lying on back to sitting on the side of the " bed?: 3  Moving to and from a bed to a chair (including a wheelchair)?: 4  Need to walk in hospital room?: 3  Climbing 3-5 steps with a railing?: 3  Total Score: 20    AM-PAC Raw Score CMS G-Code Modifier Level of Impairment Assistance   6 % Total / Unable   7 - 9 CM 80 - 100% Maximal Assist   10 - 14 CL 60 - 80% Moderate Assist   15 - 19 CK 40 - 60% Moderate Assist   20 - 22 CJ 20 - 40% Minimal Assist   23 CI 1-20% SBA / CGA   24 CH 0% Independent/ Mod I     Patient left up in chair with all lines intact, call button in reach and nurse notified.    Assessment:  Mary Singleton is a 89 y.o. female with a medical diagnosis of Influenza A patient increase with gait distance and progressing well towards goals. Patient will cont. To benefit from skilled PT services to improve strength, endurance and functional mobility. Patient will need 24/7 Supervision upon discharge.     Rehab identified problem list/impairments: Rehab identified problem list/impairments: weakness, impaired endurance, gait instability, impaired functional mobilty, impaired cognition, decreased safety awareness    Rehab potential is good.    Activity tolerance: Good    Discharge recommendations: Discharge Facility/Level Of Care Needs: home health PT (with 24 hour care upon discharge)     Barriers to discharge: Barriers to Discharge: None (patient is going to stay at her daughters house)    Equipment recommendations: Equipment Needed After Discharge: wheelchair     GOALS:   Physical Therapy Goals        Problem: Physical Therapy Goal    Goal Priority Disciplines Outcome Goal Variances Interventions   Physical Therapy Goal     PT/OT, PT Ongoing (interventions implemented as appropriate)     Description:  Goals to be met by: 17     Patient will increase functional independence with mobility by performin. Sit to stand transfer with S  2. Gait  x 150 feet with S using Rolling Walker.   3. Ascend/Descend 6 inch curb step with Contact  Guard Assistance using Rolling Walker.              Problem: Physical Therapy Goal    Goal Priority Disciplines Outcome Goal Variances Interventions   Physical Therapy Goal     PT/OT, PT                PLAN:    Patient to be seen 6 x/week  to address the above listed problems via gait training, therapeutic activities, therapeutic exercises  Plan of Care expires: 03/02/17  Plan of Care reviewed with: patient, family         Telma Bell, PTA  02/01/2017

## 2017-02-01 NOTE — PROGRESS NOTES
Per telemetry monitor, patient displayed 10-beat run of V-Tac and spontaneously converted back to normal sinus rhythm.  Patient resting comfortably in chair, denies symptoms.  On-call physician notified and new orders obtained for EKG.  Results of EKG consistent with past studies, physician notified and no new orders placed.  Will continue to monitor

## 2017-02-01 NOTE — PLAN OF CARE
Problem: Physical Therapy Goal  Goal: Physical Therapy Goal  Goals to be met by: 17     Patient will increase functional independence with mobility by performin. Sit to stand transfer with S  2. Gait x 150 feet with S using Rolling Walker.   3. Ascend/Descend 6 inch curb step with Contact Guard Assistance using Rolling Walker.   Outcome: Ongoing (interventions implemented as appropriate)     Progressing well towards goals

## 2017-02-01 NOTE — PROGRESS NOTES
Progress Note  Hospital Medicine      Admit Date: 1/30/2017 (LOS: 0)    SUBJECTIVE:     Follow-up For:  Influenza A    HPI/Interval history: No acute events overnight.  Serum creatinine trending up.    Review of Systems: Patient denies chest pain, shortness of breath.  No fevers, chills.  No nausea, vomiting, or diarrhea.    Medications: Reviewed and documented in the MAR.    OBJECTIVE:     Vital Signs (Most Recent)  Temp: 98.6 °F (37 °C) (02/01/17 0705)  Pulse: 103 (02/01/17 0705)  Resp: 18 (02/01/17 0705)  BP: (!) 171/78 (02/01/17 0705)  SpO2: (!) 93 % (02/01/17 0705)    Vital Signs Range (Last 24H):  Temp:  [97.9 °F (36.6 °C)-98.6 °F (37 °C)]   Pulse:  []   Resp:  [16-20]   BP: (119-171)/(50-89)   SpO2:  [93 %-96 %]     I & O (Last 24H):  Intake/Output Summary (Last 24 hours) at 02/01/17 0732  Last data filed at 02/01/17 0500   Gross per 24 hour   Intake              900 ml   Output              840 ml   Net               60 ml       BMI: Body mass index is 22.86 kg/(m^2).    Physical Exam:  General: Patient is awake, alert, and oriented to self but not year or place, and in no acute distress.  Eyes: Pupils equal, round, and reactive to light, anicteric sclera.  Mouth: No lesions, moist mucous membranes.  Respiratory: Lungs clear to ausculation, no crackles or wheezing.  Cardiovascular: Regular rate and rythmn, no murmurs appreciated.  Abdomen: Soft, non-tender, non-distended, normal bowel sounds.  Skin: No rashes or breakdown.  Extremities: No edema, cyanosis, or clubbing.  Neuro: No focal weakness.    Diagnostic Results:  CBC:    Recent Labs  Lab 01/30/17  1850 01/31/17  0459 02/01/17  0327   WBC 9.36 9.89 8.91   HGB 13.0 12.6 12.3   HCT 39.8 38.3 37.0    188 207   MCV 95 94 93     BMP:    Recent Labs  Lab 01/30/17  1850 01/31/17  0459 02/01/17  0327   GLU 88 114* 81   * 147* 144   K 5.5* 4.8 4.6   * 115* 111*   CO2 22* 19* 19*   BUN 72* 71* 78*   CREATININE 2.6* 2.6* 3.0*   CALCIUM  8.1* 8.0* 7.7*   MG  --   --  1.9   PHOS  --   --  5.1*       ASSESSMENT/PLAN:     Active Hospital Problems    Diagnosis  POA    *Influenza A [J10.1]  Yes    Acute congestive heart failure [I50.9]  Yes    Hyperkalemia [E87.5]  Yes    Hypochloremia [E87.8]  Yes    Hypocalcemia [E83.51]  Yes    Hypothyroidism [E03.9]  Yes     1990: Diagnosed.      Hypertension [I10]  Yes     2000: Diagnosed.      Chronic kidney disease, stage IV (severe) [N18.4]  Yes     11/15/2012: BUN/crea 38/2.1. CrCl 22.  Right arm fistula.        Resolved Hospital Problems    Diagnosis Date Resolved POA   No resolved problems to display.       Influenza A infection.  Continue with oseltamivir and supportive care.  Droplet isolation.     Hypertension.  Holding ACE inhibitor due to acute kidney injury.  Discontinued hydralazine and increased amlodipine and started on beta-blocker for better blood pressure control.      Acute kidney injury with history of chronic kidney disease, stage IV (severe).  Serum creatinine increased 2.6 mg/dL (close to baseline) yesterday to 3 mg/dL today.  Possibly due to diuretic therapy.  Patient received small fluid bolus yesterday.  I discussed with nephrology service and blood pressure medications adjusted per recommendations along with additional volume challenge today.    Hypothyroidism.  Continue with home dose of levothyroxine.    DVT prophylaxis.  Subcutaneous heparin.

## 2017-02-01 NOTE — PROGRESS NOTES
"Nephrology  Progress Note    Admit Date: 1/30/2017   LOS: 0 days     SUBJECTIVE:     Follow-up For:  Influenza A/CKD IV    Interval History:    Restless night.  Discussed with daughter and Dr. Orr.  Renal function worsening and blood pressure very labile.  Dementia seems to be worsening.  Discussed living will and DO NOT RESUSCITATE.  She will discuss with her brother and .  Patient denies chest pain or shortness of breath.    Review of Systems:  Constitutional: No fever or chills  Respiratory: No cough or shortness of breath  Cardiovascular: No chest pain or palpitations  Gastrointestinal: No nausea or vomiting  Neurological: No confusion or weakness    OBJECTIVE:     Vital Signs Range (Last 24H):  Visit Vitals    BP (!) 171/78 (BP Location: Left arm, Patient Position: Lying, BP Method: Automatic)    Pulse (!) 111    Temp 98.6 °F (37 °C) (Oral)    Resp 16    Ht 5' 4" (1.626 m)    Wt 60.4 kg (133 lb 2.5 oz)    SpO2 96%    Breastfeeding No    BMI 22.86 kg/m2       Temp:  [97.9 °F (36.6 °C)-98.6 °F (37 °C)]   Pulse:  []   Resp:  [16-20]   BP: (119-171)/(50-89)   SpO2:  [93 %-96 %]     I & O (Last 24H):  Intake/Output Summary (Last 24 hours) at 02/01/17 1001  Last data filed at 02/01/17 0500   Gross per 24 hour   Intake              660 ml   Output              840 ml   Net             -180 ml       Physical Exam:  General appearance: elderly, demented.  Eyes: Conjunctivae/corneas clear. PERRL.  Lungs: Normal respiratory effort, clear to auscultation bilaterally   Heart: Regular rate and rhythm, S1, S2 normal, no murmur, rub or julio c.  Abdomen: Soft, non-tender non-distended; bowel sounds normal; no masses, no organomegaly  Extremities: No cyanosis or clubbing. No edema.   Skin: Skin color, texture, turgor normal. No rashes or lesions  Neurologic: Normal strength and tone. No focal numbness or weakness   Dementia  Right arm AVF       Laboratory Data:    Recent Labs  Lab 02/01/17  0327   WBC " 8.91   RBC 3.96*   HGB 12.3   HCT 37.0      MCV 93   MCH 31.1*   MCHC 33.2       BMP:   Recent Labs  Lab 02/01/17  0327   GLU 81      K 4.6   *   CO2 19*   BUN 78*   CREATININE 3.0*   CALCIUM 7.7*   MG 1.9     Lab Results   Component Value Date    CALCIUM 7.7 (L) 02/01/2017    PHOS 5.1 (H) 02/01/2017               Medications:  Medication list was reviewed and changes noted under Assessment/Plan.    Diagnostic Results:        ASSESSMENT/PLAN:     1. Worsening Acute on chronic chronic kidney disease 4 likely secondary to acute illness and influenza with poor oral intake over previous week (N 18.4, N 17.9): Hold ACE inhibitor and diuretics. We'll give additional small volume 250 cc bolus now. Patient has right arm AV fistula in place but no need to start dialysis at this time.  May need to consider hospice due to worsening dementia and likelihood of needing dialysis very soon.  We'll follow urine output and renal panel.Renally dose meds, avoid nephrotoxins, and monitor I/O's closely.  2. Influenza A (J 10.1): Renally dosed Tamiflu. Continue with symptom management.  3. HTN(I 12.9): Hold ACE inhibitor and will increase calcium channel blocker for now.  Added beta blocker and adjusted doses.  4. Hyperparathyroidism (N 25.81): Hold Sensipar for now since calcium low. Continue with calcitriol as now  5. Worsening dementia (F03.90): Patient has had a rapid decline in her mental status despite Aricept. May need to discuss DO NOT RESUSCITATE and potential hospice.  6. Hypothyroidism(E03.9): Continue with Synthroid         See above orders and recommendations

## 2017-02-01 NOTE — PROGRESS NOTES
Received pt on RA with adequate sats. Treatments given with NARN; pt tolerated therapy well. No changes made. Will continue to monitor.

## 2017-02-02 LAB
ANION GAP SERPL CALC-SCNC: 14 MMOL/L
ANISOCYTOSIS BLD QL SMEAR: SLIGHT
BASOPHILS # BLD AUTO: ABNORMAL K/UL
BASOPHILS NFR BLD: 0 %
BUN SERPL-MCNC: 84 MG/DL
CALCIUM SERPL-MCNC: 7.3 MG/DL
CHLORIDE SERPL-SCNC: 109 MMOL/L
CO2 SERPL-SCNC: 16 MMOL/L
CREAT SERPL-MCNC: 3.2 MG/DL
DIFFERENTIAL METHOD: ABNORMAL
EOSINOPHIL # BLD AUTO: ABNORMAL K/UL
EOSINOPHIL NFR BLD: 1 %
ERYTHROCYTE [DISTWIDTH] IN BLOOD BY AUTOMATED COUNT: 14.8 %
EST. GFR  (AFRICAN AMERICAN): 14 ML/MIN/1.73 M^2
EST. GFR  (NON AFRICAN AMERICAN): 12 ML/MIN/1.73 M^2
GLUCOSE SERPL-MCNC: 70 MG/DL
HAV IGM SERPL QL IA: NEGATIVE
HBV CORE AB SERPL QL IA: NEGATIVE
HBV CORE IGM SERPL QL IA: NEGATIVE
HBV SURFACE AB SER-ACNC: NEGATIVE M[IU]/ML
HBV SURFACE AG SERPL QL IA: NEGATIVE
HCT VFR BLD AUTO: 36.6 %
HCV AB SERPL QL IA: NEGATIVE
HGB BLD-MCNC: 11.9 G/DL
LYMPHOCYTES # BLD AUTO: ABNORMAL K/UL
LYMPHOCYTES NFR BLD: 20 %
MAGNESIUM SERPL-MCNC: 1.9 MG/DL
MCH RBC QN AUTO: 31 PG
MCHC RBC AUTO-ENTMCNC: 32.5 %
MCV RBC AUTO: 95 FL
MONOCYTES # BLD AUTO: ABNORMAL K/UL
MONOCYTES NFR BLD: 5 %
NEUTROPHILS NFR BLD: 72 %
NEUTS BAND NFR BLD MANUAL: 2 %
PHOSPHATE SERPL-MCNC: 5 MG/DL
PLATELET # BLD AUTO: 186 K/UL
PLATELET BLD QL SMEAR: ABNORMAL
PMV BLD AUTO: 10.9 FL
POTASSIUM SERPL-SCNC: 5 MMOL/L
RBC # BLD AUTO: 3.84 M/UL
SODIUM SERPL-SCNC: 139 MMOL/L
WBC # BLD AUTO: 7.44 K/UL

## 2017-02-02 PROCEDURE — 83735 ASSAY OF MAGNESIUM: CPT

## 2017-02-02 PROCEDURE — 94640 AIRWAY INHALATION TREATMENT: CPT

## 2017-02-02 PROCEDURE — 25000242 PHARM REV CODE 250 ALT 637 W/ HCPCS: Performed by: INTERNAL MEDICINE

## 2017-02-02 PROCEDURE — 63600175 PHARM REV CODE 636 W HCPCS: Performed by: NURSE PRACTITIONER

## 2017-02-02 PROCEDURE — 80048 BASIC METABOLIC PNL TOTAL CA: CPT

## 2017-02-02 PROCEDURE — 80074 ACUTE HEPATITIS PANEL: CPT

## 2017-02-02 PROCEDURE — 90935 HEMODIALYSIS ONE EVALUATION: CPT

## 2017-02-02 PROCEDURE — 25000003 PHARM REV CODE 250: Performed by: NURSE PRACTITIONER

## 2017-02-02 PROCEDURE — 25000003 PHARM REV CODE 250: Performed by: HOSPITALIST

## 2017-02-02 PROCEDURE — 86706 HEP B SURFACE ANTIBODY: CPT

## 2017-02-02 PROCEDURE — 85007 BL SMEAR W/DIFF WBC COUNT: CPT

## 2017-02-02 PROCEDURE — 99233 SBSQ HOSP IP/OBS HIGH 50: CPT | Mod: ,,, | Performed by: HOSPITALIST

## 2017-02-02 PROCEDURE — 25000003 PHARM REV CODE 250: Performed by: PHYSICIAN ASSISTANT

## 2017-02-02 PROCEDURE — 86580 TB INTRADERMAL TEST: CPT | Performed by: NURSE PRACTITIONER

## 2017-02-02 PROCEDURE — 84100 ASSAY OF PHOSPHORUS: CPT

## 2017-02-02 PROCEDURE — 36415 COLL VENOUS BLD VENIPUNCTURE: CPT

## 2017-02-02 PROCEDURE — 86704 HEP B CORE ANTIBODY TOTAL: CPT

## 2017-02-02 PROCEDURE — 11000001 HC ACUTE MED/SURG PRIVATE ROOM

## 2017-02-02 PROCEDURE — 85027 COMPLETE CBC AUTOMATED: CPT

## 2017-02-02 PROCEDURE — 97530 THERAPEUTIC ACTIVITIES: CPT

## 2017-02-02 RX ORDER — AMLODIPINE BESYLATE 2.5 MG/1
2.5 TABLET ORAL DAILY
Status: DISCONTINUED | OUTPATIENT
Start: 2017-02-03 | End: 2017-02-03

## 2017-02-02 RX ORDER — LISINOPRIL 10 MG/1
10 TABLET ORAL DAILY
Status: DISCONTINUED | OUTPATIENT
Start: 2017-02-03 | End: 2017-02-06 | Stop reason: HOSPADM

## 2017-02-02 RX ORDER — OSELTAMIVIR PHOSPHATE 6 MG/ML
30 FOR SUSPENSION ORAL EVERY OTHER DAY
Status: DISCONTINUED | OUTPATIENT
Start: 2017-02-03 | End: 2017-02-06

## 2017-02-02 RX ORDER — SODIUM CHLORIDE 9 MG/ML
INJECTION, SOLUTION INTRAVENOUS ONCE
Status: DISCONTINUED | OUTPATIENT
Start: 2017-02-02 | End: 2017-02-06 | Stop reason: HOSPADM

## 2017-02-02 RX ADMIN — HEPARIN SODIUM 5000 UNITS: 5000 INJECTION, SOLUTION INTRAVENOUS; SUBCUTANEOUS at 06:02

## 2017-02-02 RX ADMIN — CARVEDILOL 6.25 MG: 6.25 TABLET, FILM COATED ORAL at 05:02

## 2017-02-02 RX ADMIN — Medication 1 CAPSULE: at 12:02

## 2017-02-02 RX ADMIN — FAMOTIDINE 20 MG: 20 TABLET, FILM COATED ORAL at 08:02

## 2017-02-02 RX ADMIN — DONEPEZIL HYDROCHLORIDE 10 MG: 5 TABLET, FILM COATED ORAL at 08:02

## 2017-02-02 RX ADMIN — LEVOTHYROXINE SODIUM 150 MCG: 75 TABLET ORAL at 06:02

## 2017-02-02 RX ADMIN — CALCITRIOL 0.25 MCG: 0.25 CAPSULE, LIQUID FILLED ORAL at 08:02

## 2017-02-02 RX ADMIN — QUETIAPINE FUMARATE 25 MG: 25 TABLET, FILM COATED ORAL at 08:02

## 2017-02-02 RX ADMIN — OSELTAMIVIR PHOSPHATE 30 MG: 6 POWDER, FOR SUSPENSION ORAL at 08:02

## 2017-02-02 RX ADMIN — FLUOXETINE 10 MG: 10 CAPSULE ORAL at 08:02

## 2017-02-02 RX ADMIN — IPRATROPIUM BROMIDE AND ALBUTEROL SULFATE 3 ML: .5; 3 SOLUTION RESPIRATORY (INHALATION) at 07:02

## 2017-02-02 RX ADMIN — HEPARIN SODIUM 5000 UNITS: 5000 INJECTION, SOLUTION INTRAVENOUS; SUBCUTANEOUS at 02:02

## 2017-02-02 RX ADMIN — AMLODIPINE BESYLATE 10 MG: 5 TABLET ORAL at 08:02

## 2017-02-02 RX ADMIN — IPRATROPIUM BROMIDE AND ALBUTEROL SULFATE 3 ML: .5; 3 SOLUTION RESPIRATORY (INHALATION) at 01:02

## 2017-02-02 RX ADMIN — HEPARIN SODIUM 5000 UNITS: 5000 INJECTION, SOLUTION INTRAVENOUS; SUBCUTANEOUS at 09:02

## 2017-02-02 RX ADMIN — CARVEDILOL 6.25 MG: 6.25 TABLET, FILM COATED ORAL at 08:02

## 2017-02-02 RX ADMIN — IPRATROPIUM BROMIDE AND ALBUTEROL SULFATE 3 ML: .5; 3 SOLUTION RESPIRATORY (INHALATION) at 08:02

## 2017-02-02 RX ADMIN — TUBERCULIN PURIFIED PROTEIN DERIVATIVE 5 UNITS: 5 INJECTION, SOLUTION INTRADERMAL at 12:02

## 2017-02-02 RX ADMIN — ASPIRIN 81 MG: 81 TABLET, COATED ORAL at 08:02

## 2017-02-02 NOTE — PT/OT/SLP PROGRESS
Occupational Therapy      Mary Singleton  MRN: [id number]    Patient not seen today secondary to Dialysis. Will follow up 2/3/17.    JOHNNY Desouza OTR/L   2/2/2017

## 2017-02-02 NOTE — PHYSICIAN QUERY
PT Name: Mary Singleton  MR #: [id number]     Physician Query Form - Diagnosis Clarification    Reviewer Filomena Gallo RN, [email or web address]     This form is a permanent document in the medical record.     Query Date: February 2, 2017    By submitting this query, we are merely seeking further clarification of documentation.  Please utilize your independent clinical judgment when addressing the question(s) below.     (The Medical record reflects the following:)      Findings Supporting Clinical Information Location in Medical Record   Acute congestive heart failure      - BNP of 608  - sats are 99% on RA  - CXR without signs of fluid overload, no dependant/distal edema     Despite elevated B-type natiuretic peptide patient without evidence of heart failure and in fact clinically mildly hypovolemic today. I spoke with nephrology service and we are in agreement to give small amount of intravenous fluids back with goal of achieving euvolemia. Otherwise will treat with oseltamivir and supportive care.    The primary encounter diagnosis was Acute congestive heart failure, unspecified congestive heart failure type 1/30-H&P          1/30-H&P Attestation            1/30-ED Note     Please clarify if the _____Acute CHF________ diagnosis has been:                 [   ]   Ruled In               [   ]   Ruled In, Now Resolved               [   ]   Resolved Prior to My Assessment               [  X ]   Ruled Out               [   ]   Clinically insignificant               [   ]   Clinically undetermined               [   ]   Other/Clarification of findings:_____________________________________       Please document in your progress notes daily for the duration of treatment, until resolved, and include in your discharge summary.

## 2017-02-02 NOTE — PLAN OF CARE
CM spoke with Mariana, [phone], [facility], to initiate plan for HD.  CM faxed face sheet, renal note, and will continue to send labs as they come available.

## 2017-02-02 NOTE — PT/OT/SLP PROGRESS
"Physical Therapy  Treatment    Mary Singleton   MRN: [id number]   Admitting Diagnosis: Influenza A    PT Received On: 02/02/17  PT Start Time: 0810     PT Stop Time: 0834    PT Total Time (min): 24 min       Billable Minutes:  Therapeutic Activity 24    Treatment Type: Treatment  PT/PTA: PTA     PTA Visit Number: 2       General Precautions: Standard, fall  Orthopedic Precautions: N/A   Braces: N/A    Do you have any cultural, spiritual, [place] conflicts, given your current situation?: none    Subjective:  Communicated with nurse prior to session. Pt. Stated " I feel weak today"     Pain Rating:  (unable to rate pain )     Objective:   Patient found with: peripheral IV, telemetry     Functional Mobility:    Transfers: sit to stand from bedside chair, toilet in bathroom  Sit <> Stand Assistance: Supervision  Sit <> Stand Assistive Device: Rolling Walker    Gait: SBA for safety patient appears to be SOB but oxygen saturation was 95%  Gait Distance: 20 feet X 2 trials   Gait Assistive Device: Rolling walker  Gait Pattern: reciprocal  Gait Deviation(s): decreased bryan, decreased step length, decreased weight-shifting ability    Therapeutic Activities and Exercises:  Pt. Required assistance with self cleaning after having a bowel movement   Static/dynamic stand at sink to wash hands required SBA       AM-PAC 6 CLICK MOBILITY  How much help from another person does this patient currently need?   1 = Unable, Total/Dependent Assistance  2 = A lot, Maximum/Moderate Assistance  3 = A little, Minimum/Contact Guard/Supervision  4 = None, Modified [place]/Independent    Turning over in bed (including adjusting bedclothes, sheets and blankets)?: 4  Sitting down on and standing up from a chair with arms (e.g., wheelchair, bedside commode, etc.): 3  Moving from lying on back to sitting on the side of the bed?: 3  Moving to and from a bed to a chair (including a wheelchair)?: 3  Need to walk in hospital room?: " 3  Climbing 3-5 steps with a railing?: 3  Total Score: 19    AM-PAC Raw Score CMS G-Code Modifier Level of Impairment Assistance   6 % Total / Unable   7 - 9 CM 80 - 100% Maximal Assist   10 - 14 CL 60 - 80% Moderate Assist   15 - 19 CK 40 - 60% Moderate Assist   20 - 22 CJ 20 - 40% Minimal Assist   23 CI 1-20% SBA / CGA   24 CH 0% Independent/ Mod I     Patient left up in chair with all lines intact, call button in reach and family present.    Assessment:  Mary Singleton is a 89 y.o. female with a medical diagnosis of Influenza A patient was limited secondary to complaining of being weak. Patient will cont. To benefit from skilled PT services to improve strength, endurance and functional mobility.     Rehab identified problem list/impairments: Rehab identified problem list/impairments: weakness, impaired endurance, gait instability, impaired functional mobilty, impaired cognition, decreased safety awareness    Rehab potential is good.    Activity tolerance: Good    Discharge recommendations: Discharge Facility/Level Of Care Needs: home health PT     Barriers to discharge: Barriers to Discharge: None (daughter will stay with patient )    Equipment recommendations: Equipment Needed After Discharge: wheelchair     GOALS:   Physical Therapy Goals        Problem: Physical Therapy Goal    Goal Priority Disciplines Outcome Goal Variances Interventions   Physical Therapy Goal     PT/OT, PT Ongoing (interventions implemented as appropriate)     Description:  Goals to be met by: 17     Patient will increase functional independence with mobility by performin. Sit to stand transfer with S  2. Gait  x 150 feet with S using Rolling Walker.   3. Ascend/Descend 6 inch curb step with Contact Guard Assistance using Rolling Walker.              Problem: Physical Therapy Goal    Goal Priority Disciplines Outcome Goal Variances Interventions   Physical Therapy Goal     PT/OT, PT                PLAN:     Patient to be seen 6 x/week  to address the above listed problems via gait training, therapeutic activities, therapeutic exercises  Plan of Care expires: 03/02/17  Plan of Care reviewed with: patient, family         Telma Bell, PTA  02/02/2017

## 2017-02-02 NOTE — PROGRESS NOTES
"Nephrology  Progress Note    Admit Date: 1/30/2017   LOS: 0 days     SUBJECTIVE:     Follow-up For:  Influenza A/CKD IV    Interval History:    Looks and feels better today.  More communicative.  Discussed with treatment team and with Dr. Arvizu.  Renal function continues to worsen despite being off ACE inhibitor and giving IV fluids.  Plans to start dialysis today discussed with patient and family and all are in agreement.  Denies chest pain or shortness of breath.    Review of Systems:  Constitutional: No fever or chills  Respiratory: No cough or shortness of breath  Cardiovascular: No chest pain or palpitations  Gastrointestinal: No nausea or vomiting  Neurological: No confusion or weakness    OBJECTIVE:     Vital Signs Range (Last 24H):  Visit Vitals    BP (!) 149/65 (BP Location: Left leg, Patient Position: Lying, BP Method: Automatic)    Pulse 93    Temp 98.4 °F (36.9 °C) (Oral)    Resp 18    Ht 5' 4" (1.626 m)    Wt 60.4 kg (133 lb 2.5 oz)    SpO2 96%    Breastfeeding No    BMI 22.86 kg/m2       Temp:  [97.7 °F (36.5 °C)-98.4 °F (36.9 °C)]   Pulse:  []   Resp:  [16-20]   BP: ()/(50-66)   SpO2:  [94 %-98 %]     I & O (Last 24H):    Intake/Output Summary (Last 24 hours) at 02/02/17 1029  Last data filed at 02/01/17 1900   Gross per 24 hour   Intake                0 ml   Output              300 ml   Net             -300 ml       Physical Exam:  General appearance: elderly, demented.  Eyes: Conjunctivae/corneas clear. PERRL.  Lungs: Normal respiratory effort, clear to auscultation bilaterally   Heart: Regular rate and rhythm, S1, S2 normal, no murmur, rub or julio c.  Abdomen: Soft, non-tender non-distended; bowel sounds normal; no masses, no organomegaly  Extremities: No cyanosis or clubbing. No edema.   Skin: Skin color, texture, turgor normal. No rashes or lesions  Neurologic: Normal strength and tone. No focal numbness or weakness   Dementia  Right arm AVF       Laboratory Data:    Recent " Labs  Lab 02/02/17  0436   WBC 7.44   RBC 3.84*   HGB 11.9*   HCT 36.6*      MCV 95   MCH 31.0   MCHC 32.5       BMP:     Recent Labs  Lab 02/02/17 0436   GLU 70      K 5.0      CO2 16*   BUN 84*   CREATININE 3.2*   CALCIUM 7.3*   MG 1.9     Lab Results   Component Value Date    CALCIUM 7.3 (L) 02/02/2017    PHOS 5.0 (H) 02/02/2017               Medications:  Medication list was reviewed and changes noted under Assessment/Plan.    Diagnostic Results:        ASSESSMENT/PLAN:     1. Worsening Acute on chronic chronic kidney disease 4 likely secondary to acute illness and influenza with poor oral intake over previous week and has now transitioned to end-stage renal disease (N 18.6, N 17.9, Z 99.2): Initially held ACE inhibitor and gave a few small volumes of IV fluid boluses.  Despite this patient's renal function continued to decline and plans for dialysis discussed.  We'll start dialysis today and start outpatient arrangements [facility].  Patient has right arm AV fistula in place. Renally dose meds, avoid nephrotoxins, and monitor I/O's closely.  2. Influenza A (J 10.1): Renally dosed Tamiflu. Continue with symptom management.  3. HTN(I 12.9): We will restart ACE inhibitor.  Added beta blocker and adjusted doses.  4. Hyperparathyroidism (N 25.81): Hold Sensipar for now since calcium low. Continue with calcitriol as now  5. Worsening dementia (F03.90): Patient has had a rapid decline in her mental status despite Aricept.   6. Hypothyroidism(E03.9): Continue with Synthroid         See above orders and recommendations

## 2017-02-02 NOTE — PHYSICIAN QUERY
"PT Name: Mary Singleton  MR #: [id number]  Physician Query Form - Nutrition Clarification   Reviewer Filomena Gallo RN, [email or web address]     This form is a permanent document in the medical record.     Query Date: February 2, 2017  By submitting this query, we are merely seeking further clarification of documentation.. Please utilize your independent clinical judgment when addressing the question(s) below.    The Medical record reflects the following:   Indicators     Supporting Clinical Findings Location in Medical Record    Wt/ BMI/ Usual Body Weight      Alb          TProt            Pre-Albumin     X Acute or Chronic illness Influenza A, CAD, Alzheimer's Dementia (largely non-verbal), HTN, CKD Stage IV, Hypothyroidism, Hyperparathyroidism, Hypocalcemia 1/30-H&P   X % of estimated energy intake over a time frame from p.o., TF or TPN % Intake of Estimated Energy Needs: 25 - 50  % Meal Intake: 75%  Malnutrition Reason: illness related (Dementia) 1/31-RD Note   X      X Weight status over a time frame Pt daughter report of pt skipping meals/forgetting to eat, wt loss of 7% over past 6 months    Unintentional loss of 10 lbs or more in the past 2 mos 1/31-RD Note      1/31-RD Note    Subcutaneous fat and/or muscle loss      Reduced  strength      "Delayed wound healing:, "Failure to thrive"      "Fluid accumulation" or "Edema"      "Hypoalbuminemia"     X    X Other:  Underweight, thin female, decreased appetite    "Appetite improving since beginning megace" 1/30-H&P    1/31-RD Note     AND / ASPEN Clinical Characteristics (October 2011)  A minimum of two characteristics is recommended for diagnosing either moderate or severe malnutrition    Mild Malnutrition Moderate Malnutrition Severe Malnutrition    Energy Intake from p.o., TF or TPN. < 75% intake of estimated energy needs for less than 7 days. < 75% intake of estimated energy needs for greater than 7 days. < 50% intake of estimated energy needs " for > 5 days   Weight Loss 1-2% in 1 month  5% in 3 months  7.5% in 6 months  10% in one year 1-2 % in 1 week  5% in 1 month  7.5% in 3 months  10% in 6 months  20% in 1 year > 2% in 1 week  > 5% in 1 month  >7.5% in 3 months  >10% in 6 months  >20% in 1 year   Physical Findings None *Mild subcutaneous fat and/or muscle loss  *Mild fluid accumulation  *Stage II decubitus  *Surgical wound or non-healing wound *Mod subcutaneous fat and/or muscle loss  *Mod/severe fluid accumulation  *Stage III or IV decubitus  *Non-healing surgical wound     Provider, please specify the diagnosis or diagnoses associated with above clinical findings.                                [X ] Mild Protein-Calorie Malnutrition  [ ] Moderate Protein-Calorie Malnutrition  [ ] Severe Protein-CalorieMalnutrition  [ ] Cachexia  [ ] Anorexia  [ ] Other Nutritional Diagnosis (Specify) ____________________________________  [ ] Clinically Undetermined    Please document in your progress notes daily for the duration of treatment, until resolved, and include in your discharge summary.

## 2017-02-02 NOTE — PLAN OF CARE
Problem: Patient Care Overview  Goal: Plan of Care Review  Outcome: Ongoing (interventions implemented as appropriate)  Patient in no apparent distress. Sat's 98  % on room air. Aerosol treatment given. Pt unable to do IS effectively  . Will continue to monitor.

## 2017-02-02 NOTE — PROGRESS NOTES
Progress Note  [facility] Medicine      Admit Date: 1/30/2017 (LOS: 0)    SUBJECTIVE:     Follow-up For:  Influenza A    HPI/Interval history: No acute events overnight.  Serum creatinine continues to trend up.    Review of Systems: Patient denies chest pain, shortness of breath.  No fevers, chills.  No nausea, vomiting, or diarrhea.    Medications: Reviewed and documented in the MAR.    OBJECTIVE:     Vital Signs (Most Recent)  Temp: 98.4 °F (36.9 °C) (02/02/17 0400)  Pulse: 64 (02/02/17 0600)  Resp: 18 (02/02/17 0400)  BP: (!) 144/66 (02/02/17 0400)  SpO2: 95 % (02/02/17 0400)    Vital Signs Range (Last 24H):  Temp:  [97.7 °F (36.5 °C)-98.6 °F (37 °C)]   Pulse:  []   Resp:  [16-20]   BP: ()/(50-78)   SpO2:  [93 %-98 %]     I & O (Last 24H):    Intake/Output Summary (Last 24 hours) at 02/02/17 0658  Last data filed at 02/01/17 1900   Gross per 24 hour   Intake                0 ml   Output              300 ml   Net             -300 ml       BMI: Body mass index is 22.86 kg/(m^2).    Physical Exam:  General: Patient is awake, alert, and oriented to self but not year or place, and in no acute distress.  Eyes: Pupils equal, round, and reactive to light, anicteric sclera.  Mouth: No lesions, moist mucous membranes.  Respiratory: Lungs clear to ausculation, no crackles or wheezing.  Cardiovascular: Regular rate and rythmn, no murmurs appreciated.  Abdomen: Soft, non-tender, non-distended, normal bowel sounds.  Skin: No rashes or breakdown.  Extremities: No edema, cyanosis, or clubbing.  Neuro: No focal weakness.    Diagnostic Results:  CBC:    Recent Labs  Lab 01/30/17  1850 01/31/17  0459 02/01/17  0327 02/02/17  0436   WBC 9.36 9.89 8.91 7.44   HGB 13.0 12.6 12.3 11.9*   HCT 39.8 38.3 37.0 36.6*    188 207 186   MCV 95 94 93 95     BMP:    Recent Labs  Lab 01/30/17  1850 01/31/17  0459 02/01/17  0327 02/02/17  0436   GLU 88 114* 81 70   * 147* 144 139   K 5.5* 4.8 4.6 5.0   * 115* 111* 109    CO2 22* 19* 19* 16*   BUN 72* 71* 78* 84*   CREATININE 2.6* 2.6* 3.0* 3.2*   CALCIUM 8.1* 8.0* 7.7* 7.3*   MG  --   --  1.9 1.9   PHOS  --   --  5.1* 5.0*       ASSESSMENT/PLAN:     Active Hospital Problems    Diagnosis  POA    *Influenza A [J10.1]  Yes    Acute congestive heart failure [I50.9]  Yes    Hyperkalemia [E87.5]  Yes    Hypochloremia [E87.8]  Yes    Hypocalcemia [E83.51]  Yes    Hypothyroidism [E03.9]  Yes     1990: Diagnosed.      Hypertension [I10]  Yes     2000: Diagnosed.      Chronic kidney disease, stage IV (severe) [N18.4]  Yes     11/15/2012: BUN/crea 38/2.1. CrCl 22.  Right arm fistula.        Resolved Hospital Problems    Diagnosis Date Resolved POA   No resolved problems to display.       Acute kidney injury with history of chronic kidney disease, stage IV (severe).  Serum creatinine continues to improve despite additional fluid challenge.  Plan to start hemodialysis per nephrology service.    Influenza A infection.  Continue with oseltamivir and supportive care.  Droplet isolation.     Hypertension.  Reasonably controlled with current regimen.  Will continue with current regimen and continue to monitor.    Hypothyroidism.  Continue with home dose of levothyroxine.    DVT prophylaxis.  Subcutaneous heparin.

## 2017-02-02 NOTE — PLAN OF CARE
Problem: Physical Therapy Goal  Goal: Physical Therapy Goal  Goals to be met by: 17     Patient will increase functional independence with mobility by performin. Sit to stand transfer with S  2. Gait x 150 feet with S using Rolling Walker.   3. Ascend/Descend 6 inch curb step with Contact Guard Assistance using Rolling Walker.   Outcome: Ongoing (interventions implemented as appropriate)     Progressing well but needs assistance when out of bed

## 2017-02-03 PROBLEM — T82.898A PROBLEM WITH DIALYSIS ACCESS: Status: ACTIVE | Noted: 2017-02-03

## 2017-02-03 LAB
ANION GAP SERPL CALC-SCNC: 9 MMOL/L
ANISOCYTOSIS BLD QL SMEAR: SLIGHT
BASOPHILS # BLD AUTO: ABNORMAL K/UL
BASOPHILS NFR BLD: 0 %
BUN SERPL-MCNC: 47 MG/DL
CALCIUM SERPL-MCNC: 7.8 MG/DL
CHLORIDE SERPL-SCNC: 106 MMOL/L
CO2 SERPL-SCNC: 25 MMOL/L
CREAT SERPL-MCNC: 2.5 MG/DL
DIFFERENTIAL METHOD: ABNORMAL
EOSINOPHIL # BLD AUTO: ABNORMAL K/UL
EOSINOPHIL NFR BLD: 0 %
ERYTHROCYTE [DISTWIDTH] IN BLOOD BY AUTOMATED COUNT: 14.1 %
EST. GFR  (AFRICAN AMERICAN): 19 ML/MIN/1.73 M^2
EST. GFR  (NON AFRICAN AMERICAN): 17 ML/MIN/1.73 M^2
GLUCOSE SERPL-MCNC: 103 MG/DL
HCT VFR BLD AUTO: 35.8 %
HGB BLD-MCNC: 12.1 G/DL
LYMPHOCYTES # BLD AUTO: ABNORMAL K/UL
LYMPHOCYTES NFR BLD: 19 %
MAGNESIUM SERPL-MCNC: 1.7 MG/DL
MCH RBC QN AUTO: 31 PG
MCHC RBC AUTO-ENTMCNC: 33.8 %
MCV RBC AUTO: 92 FL
MONOCYTES # BLD AUTO: ABNORMAL K/UL
MONOCYTES NFR BLD: 9 %
NEUTROPHILS NFR BLD: 68 %
NEUTS BAND NFR BLD MANUAL: 4 %
PHOSPHATE SERPL-MCNC: 3.2 MG/DL
PLATELET # BLD AUTO: 212 K/UL
PLATELET BLD QL SMEAR: ABNORMAL
PMV BLD AUTO: 10.7 FL
POLYCHROMASIA BLD QL SMEAR: ABNORMAL
POTASSIUM SERPL-SCNC: 4.3 MMOL/L
RBC # BLD AUTO: 3.9 M/UL
SODIUM SERPL-SCNC: 140 MMOL/L
WBC # BLD AUTO: 8.94 K/UL

## 2017-02-03 PROCEDURE — 94640 AIRWAY INHALATION TREATMENT: CPT

## 2017-02-03 PROCEDURE — 25000003 PHARM REV CODE 250: Performed by: NURSE PRACTITIONER

## 2017-02-03 PROCEDURE — 25000003 PHARM REV CODE 250: Performed by: PHYSICIAN ASSISTANT

## 2017-02-03 PROCEDURE — 25000003 PHARM REV CODE 250: Performed by: HOSPITALIST

## 2017-02-03 PROCEDURE — 11000001 HC ACUTE MED/SURG PRIVATE ROOM

## 2017-02-03 PROCEDURE — 80048 BASIC METABOLIC PNL TOTAL CA: CPT

## 2017-02-03 PROCEDURE — 99232 SBSQ HOSP IP/OBS MODERATE 35: CPT | Mod: ,,, | Performed by: HOSPITALIST

## 2017-02-03 PROCEDURE — 85007 BL SMEAR W/DIFF WBC COUNT: CPT

## 2017-02-03 PROCEDURE — 83735 ASSAY OF MAGNESIUM: CPT

## 2017-02-03 PROCEDURE — 36415 COLL VENOUS BLD VENIPUNCTURE: CPT

## 2017-02-03 PROCEDURE — 80100016 HC MAINTENANCE HEMODIALYSIS

## 2017-02-03 PROCEDURE — 85027 COMPLETE CBC AUTOMATED: CPT

## 2017-02-03 PROCEDURE — 25000242 PHARM REV CODE 250 ALT 637 W/ HCPCS: Performed by: INTERNAL MEDICINE

## 2017-02-03 PROCEDURE — 97535 SELF CARE MNGMENT TRAINING: CPT

## 2017-02-03 PROCEDURE — 84100 ASSAY OF PHOSPHORUS: CPT

## 2017-02-03 RX ADMIN — FLUOXETINE 10 MG: 10 CAPSULE ORAL at 10:02

## 2017-02-03 RX ADMIN — OSELTAMIVIR PHOSPHATE 30 MG: 6 POWDER, FOR SUSPENSION ORAL at 10:02

## 2017-02-03 RX ADMIN — IPRATROPIUM BROMIDE AND ALBUTEROL SULFATE 3 ML: .5; 3 SOLUTION RESPIRATORY (INHALATION) at 01:02

## 2017-02-03 RX ADMIN — LEVOTHYROXINE SODIUM 150 MCG: 75 TABLET ORAL at 06:02

## 2017-02-03 RX ADMIN — HEPARIN SODIUM 5000 UNITS: 5000 INJECTION, SOLUTION INTRAVENOUS; SUBCUTANEOUS at 02:02

## 2017-02-03 RX ADMIN — LISINOPRIL 10 MG: 10 TABLET ORAL at 10:02

## 2017-02-03 RX ADMIN — QUETIAPINE FUMARATE 25 MG: 25 TABLET, FILM COATED ORAL at 10:02

## 2017-02-03 RX ADMIN — FAMOTIDINE 20 MG: 20 TABLET, FILM COATED ORAL at 09:02

## 2017-02-03 RX ADMIN — Medication 1 CAPSULE: at 10:02

## 2017-02-03 RX ADMIN — CARVEDILOL 6.25 MG: 6.25 TABLET, FILM COATED ORAL at 05:02

## 2017-02-03 RX ADMIN — CALCITRIOL 0.25 MCG: 0.25 CAPSULE, LIQUID FILLED ORAL at 10:02

## 2017-02-03 RX ADMIN — CARVEDILOL 6.25 MG: 6.25 TABLET, FILM COATED ORAL at 10:02

## 2017-02-03 RX ADMIN — IPRATROPIUM BROMIDE AND ALBUTEROL SULFATE 3 ML: .5; 3 SOLUTION RESPIRATORY (INHALATION) at 07:02

## 2017-02-03 RX ADMIN — HEPARIN SODIUM 5000 UNITS: 5000 INJECTION, SOLUTION INTRAVENOUS; SUBCUTANEOUS at 09:02

## 2017-02-03 RX ADMIN — DONEPEZIL HYDROCHLORIDE 10 MG: 5 TABLET, FILM COATED ORAL at 10:02

## 2017-02-03 RX ADMIN — STANDARDIZED SENNA CONCENTRATE AND DOCUSATE SODIUM 1 TABLET: 8.6; 5 TABLET, FILM COATED ORAL at 09:02

## 2017-02-03 RX ADMIN — HEPARIN SODIUM 5000 UNITS: 5000 INJECTION, SOLUTION INTRAVENOUS; SUBCUTANEOUS at 06:02

## 2017-02-03 RX ADMIN — FAMOTIDINE 20 MG: 20 TABLET, FILM COATED ORAL at 10:02

## 2017-02-03 RX ADMIN — IPRATROPIUM BROMIDE AND ALBUTEROL SULFATE 3 ML: .5; 3 SOLUTION RESPIRATORY (INHALATION) at 08:02

## 2017-02-03 RX ADMIN — ASPIRIN 81 MG: 81 TABLET, COATED ORAL at 10:02

## 2017-02-03 NOTE — PT/OT/SLP PROGRESS
Occupational Therapy  Treatment/Discharge    Mary Singleton   MRN: [id number]   Admitting Diagnosis: Influenza A    OT Date of Treatment: 17   OT Start Time: 1006  OT Stop Time: 1032  OT Total Time (min): 26 min    Billable Minutes:  Self Care/Home Management 26    General Precautions: Standard, fall  Orthopedic Precautions: N/A  Braces: N/A    Do you have any cultural, spiritual, Lutheran conflicts, given your current situation?: none reported    Subjective:  The patient was found supine in bed with family present.  She was agreeable to OT treatment.    Pain Ratin/10  Pain Rating Post-Intervention: 0/10    Objective:  Patient found with: peripheral IV, telemetry, droplet isolation     Functional Mobility:  Bed Mobility:  Supine to Sit: Minimum Assistance    Transfers:needs reminder to use RW    Min A toilet)/Supervision (bed) sit><stand with RW  Supervision toilet transfer with RW  Supervision chair transfer with RW    Functional Ambulation: ambulated with RW bed>bathroom>sit>chair with RW supervision Assist    Activities of Daily Living:  Feeding Level of Assistance: Modified independent (seated in chair)  UE Dressing Level of Assistance: Supervision (don hospital gown as robe)}  LE Dressing Level of Assistance: Modified independent (don/doff socks, don shoes seated EOB)  Grooming Position: Standing at sink (wash hands and hands, brush teeth)  Grooming Level of Assistance: Supervision  Toileting Where Assessed: Toilet  Toileting Level of Assistance: Modified independent    Balance:   Static Sit: GOOD-: Takes MODERATE challenges from all directions but inconsistently  Dynamic Sit: GOOD-: Maintains balance through MODERATE excursions of active trunk movement,     Static Stand: GOOD: Takes MODERATE challenges from all directions  Dynamic stand: GOOD: Needs SUPERVISION only during gait and able to self right with moderate     Therapeutic Activities and Exercises:  RW use and safety    AM-PAC 6 CLICK ADL    How much help from another person does this patient currently need?   1 = Unable, Total/Dependent Assistance  2 = A lot, Maximum/Moderate Assistance  3 = A little, Minimum/Contact Guard/Supervision  4 = None, Modified [place]/Independent    Putting on and taking off regular lower body clothing? : 4  Bathing (including washing, rinsing, drying)?: 3  Toileting, which includes using toilet, bedpan, or urinal? : 4  Putting on and taking off regular upper body clothing?: 3  Taking care of personal grooming such as brushing teeth?: 3  Eating meals?: 4  Total Score: 21     AM-PAC Raw Score CMS G-Code Modifier Level of Impairment Assistance   6 % Total / Unable   7 - 9 CM 80 - 100% Maximal Assist   10 - 14 CL 60 - 80% Moderate Assist   15 - 19 CK 40 - 60% Moderate Assist   20 - 22 CJ 20 - 40% Minimal Assist   23 CI 1-20% SBA / CGA   24 CH 0% Independent/ Mod I     Patient left up in chair with all lines intact, call button in reach and family present    ASSESSMENT:  Mary Singleton is a 89 y.o. female with a medical diagnosis of Influenza A and presents with ambulation and basic self-care at pre-admit status.  She was Supervision for ambulation and transfers, needing assist to stand from low toilet only.  She was Supervision to MI for self-care tasks. All goals met.  D/C OT..    Rehab identified problem list/impairments: Rehab identified problem list/impairments:  (Performance Components Effecting Function: memoty loss)    Rehab potential is good.    Activity tolerance: Good    Discharge recommendations: Discharge Facility/Level Of Care Needs: home health PT     Barriers to discharge: Barriers to Discharge: None    Equipment recommendations: wheelchair     GOALS:   Occupational Therapy Goals        Problem: Occupational Therapy Goal    Goal Priority Disciplines Outcome Interventions   Occupational Therapy Goal     OT, PT/OT Ongoing (interventions implemented as appropriate)    Description:  Goals to be met  by: 2/6/17     Patient will increase functional independence with ADLs by performing:    LE Dressing with Supervision.  Grooming while standing at sink with Supervision.  Maximize endurance, to be able to tolerate attending to grooming in standing x 3 tasks.  .                Plan:  Patient has been seen 4 x/week to address the above listed problems via self-care/home management, therapeutic activities, therapeutic exercises.  She is at pre-admit self-care status with no further OT treatment needs identified.  D/C OT.  Plan of Care discontinued  Plan of Care reviewed with: patient, family         MAGUE Sharma  02/03/2017

## 2017-02-03 NOTE — PROGRESS NOTES
"Nephrology  Progress Note    Admit Date: 1/30/2017   LOS: 1 day     SUBJECTIVE:     Follow-up For:  Influenza A/CKD IV    Interval History:    Very restless night.  Seen on dialysis.  Denies chest pain or shortness of breath.      Addendum: Notified by dialysis nurse that patient unable to keep on straight and infiltrated AV fistula.  Pressure applied until hemostasis achieved.  Discussed with dialysis nurse, patient, family, Dr. Arvizu, Dr. Larsen, and treatment team.  Patient is truly not a candidate for outpatient dialysis via AV fistula due to her dementia and likelihood of repeated infiltrations.  Options discussed of hospice versus dialysis catheter placement and attempted to dialyze as an outpatient.  Family would like to proceed with dialysis catheter.    Discussed with Dr. Jain who will be available Monday.  Anticipate patient staying over the weekend to monitor fistula for further bleeding and dialysis catheter placement Monday.    Patient has been arranged at [facility] for Monday Wednesday and Friday at 2:00 PM.    Review of Systems:  Constitutional: No fever or chills  Respiratory: No cough or shortness of breath  Cardiovascular: No chest pain or palpitations  Gastrointestinal: No nausea or vomiting  Neurological: No confusion or weakness    OBJECTIVE:     Vital Signs Range (Last 24H):  Visit Vitals    BP (!) 157/58    Pulse 64    Temp 98 °F (36.7 °C)    Resp 18    Ht 5' 4" (1.626 m)    Wt 60.4 kg (133 lb 2.5 oz)    SpO2 98%    Breastfeeding No    BMI 22.86 kg/m2       Temp:  [97.4 °F (36.3 °C)-99.8 °F (37.7 °C)]   Pulse:  [64-97]   Resp:  [18-20]   BP: (112-172)/(52-78)   SpO2:  [94 %-98 %]     I & O (Last 24H):    Intake/Output Summary (Last 24 hours) at 02/03/17 1041  Last data filed at 02/03/17 0900   Gross per 24 hour   Intake             1250 ml   Output             1975 ml   Net             -725 ml       Physical Exam:  General appearance: elderly, demented.  Eyes: " Conjunctivae/corneas clear. PERRL.  Lungs: Normal respiratory effort, clear to auscultation bilaterally   Heart: Regular rate and rhythm, S1, S2 normal, no murmur, rub or julio c.  Abdomen: Soft, non-tender non-distended; bowel sounds normal; no masses, no organomegaly  Extremities: No cyanosis or clubbing. No edema.   Skin: Skin color, texture, turgor normal. No rashes or lesions  Neurologic: Normal strength and tone. No focal numbness or weakness   Dementia  Right arm AVF-infiltrated       Laboratory Data:    Recent Labs  Lab 02/03/17  0550   WBC 8.94   RBC 3.90*   HGB 12.1   HCT 35.8*      MCV 92   MCH 31.0   MCHC 33.8       BMP:     Recent Labs  Lab 02/03/17  0550         K 4.3      CO2 25   BUN 47*   CREATININE 2.5*   CALCIUM 7.8*   MG 1.7     Lab Results   Component Value Date    CALCIUM 7.8 (L) 02/03/2017    PHOS 3.2 02/03/2017               Medications:  Medication list was reviewed and changes noted under Assessment/Plan.    Diagnostic Results:        ASSESSMENT/PLAN:     1. Worsening Acute on chronic chronic kidney disease 4 likely secondary to acute illness and influenza with poor oral intake over previous week and has now transitioned to end-stage renal disease (N 18.6, N 17.9, Z 99.2): Initially held ACE inhibitor and gave a few small volumes of IV fluid boluses.  Despite this patient's renal function continued to decline and plans for dialysis discussed.  Started dialysis and see history of present illness for events.  Outpatient dialysis arranged at [facility] Monday Wednesday and Friday at 2 PM. Renally dose meds, avoid nephrotoxins, and monitor I/O's closely.  2. AV fistula infiltration secondary to dementia (T 82.604M): See history of present illness.  Monitor fistula over the weekend for signs of bleeding and plan for dialysis catheter insertion on Monday morning by Dr. Jain.  3. Influenza A (J 10.1): Renally dosed Tamiflu. Continue with symptom  management.  4. HTN(I 12.9): Restarted ACE inhibitor.  Added beta blocker and adjusted doses.  5. Hyperparathyroidism (N 25.81): Hold Sensipar for now since calcium low. Continue with calcitriol as now  6. Worsening dementia (F03.90): Patient has had a rapid decline in her mental status despite Aricept.   7. Hypothyroidism(E03.9): Continue with Synthroid         See above orders and recommendations

## 2017-02-03 NOTE — PROGRESS NOTES
Patient not seen secondary to away at dialysis in the a.m. Will attempt as scheduled Telma Bell 2/3/2017

## 2017-02-03 NOTE — PLAN OF CARE
Problem: Occupational Therapy Goal  Goal: Occupational Therapy Goal  Goals to be met by: 2/6/17     Patient will increase functional independence with ADLs by performing:    LE Dressing with Supervision.(MET 2/3/17)  Grooming while standing at sink with Supervision.(MET 2/3/17)  Maximize endurance, to be able to tolerate attending to grooming in standing x 3 tasks.(MET 2/3/17)  .   Outcome: Outcome(s) achieved Date Met:  02/03/17  The patient was Supervision for ambulation for self-care in her room and MI to Supervision for dressing, grooming and feeding tasks.  D/C OT goals met.  MAGUE Sharma 2/3/2017

## 2017-02-03 NOTE — PROGRESS NOTES
Progress Note  [facility] Medicine      Admit Date: 1/30/2017 (LOS: 1)    SUBJECTIVE:     Follow-up For:  Influenza A    HPI/Interval history: No acute events overnight.  Tolerated HD yesterday.    Review of Systems: Patient denies chest pain, shortness of breath.  No fevers, chills.  No nausea, vomiting, or diarrhea.    Medications: Reviewed and documented in the MAR.    OBJECTIVE:     Vital Signs (Most Recent)  Temp: 98.6 °F (37 °C) (02/03/17 0400)  Pulse: 80 (02/03/17 0600)  Resp: 18 (02/03/17 0400)  BP: 132/64 (02/03/17 0400)  SpO2: 95 % (02/03/17 0400)    Vital Signs Range (Last 24H):  Temp:  [97.4 °F (36.3 °C)-99.8 °F (37.7 °C)]   Pulse:  []   Resp:  [18-20]   BP: (112-172)/(54-78)   SpO2:  [94 %-96 %]     I & O (Last 24H):    Intake/Output Summary (Last 24 hours) at 02/03/17 0705  Last data filed at 02/03/17 0600   Gross per 24 hour   Intake              750 ml   Output             1675 ml   Net             -925 ml       BMI: Body mass index is 22.86 kg/(m^2).    Physical Exam:  General: Patient is awake, alert, and oriented to self but not year or place, and in no acute distress.  Eyes: Pupils equal, round, and reactive to light, anicteric sclera.  Mouth: No lesions, moist mucous membranes.  Respiratory: Lungs clear to ausculation, no crackles or wheezing.  Cardiovascular: Regular rate and rythmn, no murmurs appreciated.  Abdomen: Soft, non-tender, non-distended, normal bowel sounds.  Skin: No rashes or breakdown.  Extremities: No edema, cyanosis, or clubbing.  Neuro: No focal weakness.    Diagnostic Results:  CBC:    Recent Labs  Lab 02/01/17  0327 02/02/17  0436 02/03/17  0550   WBC 8.91 7.44 8.94   HGB 12.3 11.9* 12.1   HCT 37.0 36.6* 35.8*    186 212   MCV 93 95 92     BMP:    Recent Labs  Lab 02/01/17  0327 02/02/17  0436 02/03/17  0550   GLU 81 70 103    139 140   K 4.6 5.0 4.3   * 109 106   CO2 19* 16* 25   BUN 78* 84* 47*   CREATININE 3.0* 3.2* 2.5*   CALCIUM 7.7* 7.3* 7.8*    MG 1.9 1.9 1.7   PHOS 5.1* 5.0* 3.2       ASSESSMENT/PLAN:     Active Hospital Problems    Diagnosis  POA    *Influenza A [J10.1]  Yes    Acute congestive heart failure [I50.9]  Yes    Hyperkalemia [E87.5]  Yes    Hypochloremia [E87.8]  Yes    Hypocalcemia [E83.51]  Yes    Hypothyroidism [E03.9]  Yes     1990: Diagnosed.      Hypertension [I10]  Yes     2000: Diagnosed.      Chronic kidney disease, stage IV (severe) [N18.4]  Yes     11/15/2012: BUN/crea 38/2.1. CrCl 22.  Right arm fistula.        Resolved Hospital Problems    Diagnosis Date Resolved POA   No resolved problems to display.       Acute kidney injury with history of chronic kidney disease, stage IV (severe).  Hemodialysis per nephrology service.    Influenza A infection.  Continue with oseltamivir and supportive care.  Droplet isolation.     Hypertension.  Reasonably controlled with current regimen.  Will continue with current regimen and continue to monitor.    Hypothyroidism.  Continue with home dose of levothyroxine.    DVT prophylaxis.  Subcutaneous heparin.

## 2017-02-04 LAB
ANION GAP SERPL CALC-SCNC: 10 MMOL/L
BASOPHILS # BLD AUTO: 0.02 K/UL
BASOPHILS NFR BLD: 0.2 %
BUN SERPL-MCNC: 51 MG/DL
CALCIUM SERPL-MCNC: 7.8 MG/DL
CHLORIDE SERPL-SCNC: 103 MMOL/L
CO2 SERPL-SCNC: 25 MMOL/L
CREAT SERPL-MCNC: 2.8 MG/DL
DIFFERENTIAL METHOD: ABNORMAL
EOSINOPHIL # BLD AUTO: 0.1 K/UL
EOSINOPHIL NFR BLD: 1.4 %
ERYTHROCYTE [DISTWIDTH] IN BLOOD BY AUTOMATED COUNT: 13.8 %
EST. GFR  (AFRICAN AMERICAN): 17 ML/MIN/1.73 M^2
EST. GFR  (NON AFRICAN AMERICAN): 14 ML/MIN/1.73 M^2
GLUCOSE SERPL-MCNC: 91 MG/DL
HCT VFR BLD AUTO: 33.2 %
HGB BLD-MCNC: 11.2 G/DL
LYMPHOCYTES # BLD AUTO: 1.7 K/UL
LYMPHOCYTES NFR BLD: 18.7 %
MAGNESIUM SERPL-MCNC: 1.8 MG/DL
MCH RBC QN AUTO: 31.1 PG
MCHC RBC AUTO-ENTMCNC: 33.7 %
MCV RBC AUTO: 92 FL
MONOCYTES # BLD AUTO: 0.7 K/UL
MONOCYTES NFR BLD: 8.2 %
NEUTROPHILS # BLD AUTO: 6.3 K/UL
NEUTROPHILS NFR BLD: 70.1 %
PHOSPHATE SERPL-MCNC: 4.1 MG/DL
PLATELET # BLD AUTO: 202 K/UL
PMV BLD AUTO: 11.2 FL
POTASSIUM SERPL-SCNC: 4.2 MMOL/L
RBC # BLD AUTO: 3.6 M/UL
SODIUM SERPL-SCNC: 138 MMOL/L
TB INDURATION 48 - 72 HR READ: 0 MM
WBC # BLD AUTO: 9 K/UL

## 2017-02-04 PROCEDURE — 97116 GAIT TRAINING THERAPY: CPT

## 2017-02-04 PROCEDURE — 25000242 PHARM REV CODE 250 ALT 637 W/ HCPCS: Performed by: INTERNAL MEDICINE

## 2017-02-04 PROCEDURE — 80048 BASIC METABOLIC PNL TOTAL CA: CPT

## 2017-02-04 PROCEDURE — 97110 THERAPEUTIC EXERCISES: CPT

## 2017-02-04 PROCEDURE — 25000003 PHARM REV CODE 250: Performed by: INTERNAL MEDICINE

## 2017-02-04 PROCEDURE — 25000003 PHARM REV CODE 250: Performed by: HOSPITALIST

## 2017-02-04 PROCEDURE — 99232 SBSQ HOSP IP/OBS MODERATE 35: CPT | Mod: ,,, | Performed by: HOSPITALIST

## 2017-02-04 PROCEDURE — 85025 COMPLETE CBC W/AUTO DIFF WBC: CPT

## 2017-02-04 PROCEDURE — 11000001 HC ACUTE MED/SURG PRIVATE ROOM

## 2017-02-04 PROCEDURE — 97530 THERAPEUTIC ACTIVITIES: CPT

## 2017-02-04 PROCEDURE — 25000003 PHARM REV CODE 250: Performed by: PHYSICIAN ASSISTANT

## 2017-02-04 PROCEDURE — 84100 ASSAY OF PHOSPHORUS: CPT

## 2017-02-04 PROCEDURE — 36415 COLL VENOUS BLD VENIPUNCTURE: CPT

## 2017-02-04 PROCEDURE — 25000003 PHARM REV CODE 250: Performed by: NURSE PRACTITIONER

## 2017-02-04 PROCEDURE — 83735 ASSAY OF MAGNESIUM: CPT

## 2017-02-04 PROCEDURE — 94640 AIRWAY INHALATION TREATMENT: CPT

## 2017-02-04 RX ADMIN — FLUOXETINE 10 MG: 10 CAPSULE ORAL at 09:02

## 2017-02-04 RX ADMIN — LEVOTHYROXINE SODIUM 150 MCG: 75 TABLET ORAL at 05:02

## 2017-02-04 RX ADMIN — IPRATROPIUM BROMIDE AND ALBUTEROL SULFATE 3 ML: .5; 3 SOLUTION RESPIRATORY (INHALATION) at 08:02

## 2017-02-04 RX ADMIN — LISINOPRIL 10 MG: 10 TABLET ORAL at 09:02

## 2017-02-04 RX ADMIN — CARVEDILOL 6.25 MG: 6.25 TABLET, FILM COATED ORAL at 05:02

## 2017-02-04 RX ADMIN — HEPARIN SODIUM 5000 UNITS: 5000 INJECTION, SOLUTION INTRAVENOUS; SUBCUTANEOUS at 05:02

## 2017-02-04 RX ADMIN — HEPARIN SODIUM 5000 UNITS: 5000 INJECTION, SOLUTION INTRAVENOUS; SUBCUTANEOUS at 01:02

## 2017-02-04 RX ADMIN — HEPARIN SODIUM 5000 UNITS: 5000 INJECTION, SOLUTION INTRAVENOUS; SUBCUTANEOUS at 09:02

## 2017-02-04 RX ADMIN — IPRATROPIUM BROMIDE AND ALBUTEROL SULFATE 3 ML: .5; 3 SOLUTION RESPIRATORY (INHALATION) at 02:02

## 2017-02-04 RX ADMIN — FAMOTIDINE 20 MG: 20 TABLET, FILM COATED ORAL at 09:02

## 2017-02-04 RX ADMIN — IPRATROPIUM BROMIDE AND ALBUTEROL SULFATE 3 ML: .5; 3 SOLUTION RESPIRATORY (INHALATION) at 07:02

## 2017-02-04 RX ADMIN — CALCITRIOL 0.25 MCG: 0.25 CAPSULE, LIQUID FILLED ORAL at 09:02

## 2017-02-04 RX ADMIN — DONEPEZIL HYDROCHLORIDE 10 MG: 5 TABLET, FILM COATED ORAL at 09:02

## 2017-02-04 RX ADMIN — Medication 1 CAPSULE: at 09:02

## 2017-02-04 RX ADMIN — ASPIRIN 81 MG: 81 TABLET, COATED ORAL at 09:02

## 2017-02-04 RX ADMIN — QUETIAPINE FUMARATE 25 MG: 25 TABLET, FILM COATED ORAL at 09:02

## 2017-02-04 RX ADMIN — CARVEDILOL 6.25 MG: 6.25 TABLET, FILM COATED ORAL at 09:02

## 2017-02-04 NOTE — PT/OT/SLP PROGRESS
Physical Therapy  Treatment    Mary Singleton   MRN: [id number]   Admitting Diagnosis: Influenza A    PT Received On: 17  PT Start Time: 1430     PT Stop Time: 1500    PT Total Time (min): 30 min       Billable Minutes:  Gait [id number], Therapeutic Activity 10 and Therapeutic Exercise 10    Treatment Type: Treatment  PT/PTA: PTA     PTA Visit Number: 3       General Precautions: Standard, fall  Orthopedic Precautions: N/A   Braces:      Do you have any cultural, spiritual, Gnosticist conflicts, given your current situation?: none    Subjective:  Communicated with family prior to session.  Pt reports she does not want to walk    Pain Ratin/10              Pain Rating Post-Intervention: 0/10 (pt reporting legs hurting after walking)    Objective:   Patient found with: peripheral IV, telemetry supine in bed with family members present    Functional Mobility:  Bed Mobility:   Rolling/Turning to Left: Modified independent  Scooting/Bridging: Modified Independent (to edge of bed)  Supine to Sit: Modified Independent  Sit to Supine: Modified Independent    Transfers:  Sit <> Stand Assistance: Stand By Assistance  Sit <> Stand Assistive Device: Rolling Walker    Gait:   Gait Distance: 80' in room.  pt complaining of leg pain following gait training. cueing to stand tall  Assistance 1: Stand by Assistance  Gait Assistive Device: Rolling walker  Gait Pattern: reciprocal    Therapeutic Activities and Exercises:  Pt was instructed in and performed therex for BLEs including: ap, laq, hip abd/add, heel slides, slr x 15 each     AM-PAC 6 CLICK MOBILITY  How much help from another person does this patient currently need?   1 = Unable, Total/Dependent Assistance  2 = A lot, Maximum/Moderate Assistance  3 = A little, Minimum/Contact Guard/Supervision  4 = None, Modified [place]/Independent    Turning over in bed (including adjusting bedclothes, sheets and blankets)?: 4  Sitting down on and standing up from a chair  with arms (e.g., wheelchair, bedside commode, etc.): 3  Moving from lying on back to sitting on the side of the bed?: 3  Moving to and from a bed to a chair (including a wheelchair)?: 3  Need to walk in hospital room?: 3  Climbing 3-5 steps with a railing?: 3  Total Score: 19    AM-PAC Raw Score CMS G-Code Modifier Level of Impairment Assistance   6 % Total / Unable   7 - 9 CM 80 - 100% Maximal Assist   10 - 14 CL 60 - 80% Moderate Assist   15 - 19 CK 40 - 60% Moderate Assist   20 - 22 CJ 20 - 40% Minimal Assist   23 CI 1-20% SBA / CGA   24 CH 0% Independent/ Mod I     Patient left supine with all lines intact, call button in reach, bed alarm on and family members  present.    Assessment:  Mary Singleton is a 89 y.o. female with a medical diagnosis of Influenza A pt needed some encouragement to perform therapy.  Ambulated 80' in room before complaining of some leg pain.  Pt progressing with therapy and will continue to benefit from skilled PT intervention    Rehab identified problem list/impairments: Rehab identified problem list/impairments: weakness, impaired self care skills, impaired functional mobilty, gait instability    Rehab potential is good.    Activity tolerance: Good    Discharge recommendations: Discharge Facility/Level Of Care Needs: home health PT     Barriers to discharge: Barriers to Discharge: None    Equipment recommendations: Equipment Needed After Discharge: wheelchair     GOALS:   Physical Therapy Goals        Problem: Physical Therapy Goal    Goal Priority Disciplines Outcome Goal Variances Interventions   Physical Therapy Goal     PT/OT, PT Ongoing (interventions implemented as appropriate)     Description:  Goals to be met by: 17     Patient will increase functional independence with mobility by performin. Sit to stand transfer with S  2. Gait  x 150 feet with S using Rolling Walker.   3. Ascend/Descend 6 inch curb step with Contact Guard Assistance using Rolling  Walker.              Problem: Physical Therapy Goal    Goal Priority Disciplines Outcome Goal Variances Interventions   Physical Therapy Goal     PT/OT, PT                PLAN:    Patient to be seen 6 x/week  to address the above listed problems via gait training, therapeutic activities, therapeutic exercises  Plan of Care expires: 03/02/17  Plan of Care reviewed with: patient, family         Eleno Nicholson, OKLBY  02/04/2017

## 2017-02-04 NOTE — PLAN OF CARE
Problem: Patient Care Overview  Goal: Individualization & Mutuality  Outcome: Ongoing (interventions implemented as appropriate)  Bed in low and locked position and able to reposition per self and amb with assist and walker.  Remains free of injury during shift.

## 2017-02-04 NOTE — PLAN OF CARE
Problem: Patient Care Overview  Goal: Plan of Care Review  Outcome: Ongoing (interventions implemented as appropriate)  Patient received on RA SAT 95%, will continue to monitor. Q6 TX given.

## 2017-02-04 NOTE — PROGRESS NOTES
Progress Note  [facility] Medicine      Admit Date: 1/30/2017 (LOS: 2)    SUBJECTIVE:     Follow-up For:  Influenza A    HPI/Interval history: No acute events overnight.  AV fistula infiltrated yesterday.  No bleeding from site.  No acute events.    Review of Systems: Patient denies chest pain, shortness of breath.  No fevers, chills.  No nausea, vomiting, or diarrhea.    Medications: Reviewed and documented in the MAR.    OBJECTIVE:     Vital Signs (Most Recent)  Temp: 98.2 °F (36.8 °C) (02/04/17 0400)  Pulse: 74 (02/04/17 0600)  Resp: 18 (02/04/17 0400)  BP: (!) 144/65 (02/04/17 0400)  SpO2: (!) 93 % (02/04/17 0000)    Vital Signs Range (Last 24H):  Temp:  [97.6 °F (36.4 °C)-98.3 °F (36.8 °C)]   Pulse:  []   Resp:  [16-18]   BP: ()/(52-78)   SpO2:  [93 %-98 %]     I & O (Last 24H):    Intake/Output Summary (Last 24 hours) at 02/04/17 0652  Last data filed at 02/03/17 2000   Gross per 24 hour   Intake              650 ml   Output              300 ml   Net              350 ml       BMI: Body mass index is 22.86 kg/(m^2).    Physical Exam:  General: Patient is awake, alert, and oriented to self but not year or place, and in no acute distress.  Eyes: Pupils equal, round, and reactive to light, anicteric sclera.  Mouth: No lesions, moist mucous membranes.  Respiratory: Lungs clear to ausculation, no crackles or wheezing.  Cardiovascular: Regular rate and rythmn, no murmurs appreciated.  Abdomen: Soft, non-tender, non-distended, normal bowel sounds.  Skin: No rashes or breakdown.  Extremities: No edema, cyanosis, or clubbing.  Right upper extremity fistula with mild swelling, no tenderness or bleeding.  Neuro: No focal weakness.    Diagnostic Results:  CBC:    Recent Labs  Lab 02/02/17  0436 02/03/17  0550 02/04/17  0424   WBC 7.44 8.94 9.00   HGB 11.9* 12.1 11.2*   HCT 36.6* 35.8* 33.2*    212 202   MCV 95 92 92     BMP:    Recent Labs  Lab 02/02/17  0436 02/03/17  0550 02/04/17  0424   GLU 70 103 91     140 138   K 5.0 4.3 4.2    106 103   CO2 16* 25 25   BUN 84* 47* 51*   CREATININE 3.2* 2.5* 2.8*   CALCIUM 7.3* 7.8* 7.8*   MG 1.9 1.7 1.8   PHOS 5.0* 3.2 4.1       ASSESSMENT/PLAN:     Active Hospital Problems    Diagnosis  POA    *Influenza A [J10.1]  Yes    Problem with dialysis access [T82.898A]  No    Acute congestive heart failure [I50.9]  Yes    Hyperkalemia [E87.5]  Yes    Hypochloremia [E87.8]  Yes    Hypocalcemia [E83.51]  Yes    Hypothyroidism [E03.9]  Yes     1990: Diagnosed.      Hypertension [I10]  Yes     2000: Diagnosed.      Chronic kidney disease, stage IV (severe) [N18.4]  Yes     11/15/2012: BUN/crea 38/2.1. CrCl 22.  Right arm fistula.        Resolved Hospital Problems    Diagnosis Date Resolved POA   No resolved problems to display.       Acute kidney injury with history of chronic kidney disease, stage IV (severe).  Patient not tolerating dialysis well due to dementia. Hold off on further dialysis for now and monitor kidney function.    Influenza A infection.  Continue with oseltamivir and supportive care.  Droplet isolation.     Hypertension.  Reasonably controlled with current regimen.  Will continue with current regimen and continue to monitor.    Hypothyroidism.  Continue with home dose of levothyroxine.    DVT prophylaxis.  Subcutaneous heparin.

## 2017-02-04 NOTE — PROGRESS NOTES
"Nephrology  Progress Note    Admit Date: 1/30/2017   LOS: 2 days     SUBJECTIVE:     Follow-up For:  Influenza A/CKD IV    Interval History:    No overnight events.  Pt intermittently confused.  Son at he bedside.  Had long discussion with son regarding pros/cons of continuing dialysis.  He is concerned that regardless of dialysis access pt will likely get agitated on HD and pull out access and harm herself more.    Review of Systems:  Constitutional: No fever or chills  Respiratory: No cough or shortness of breath  Cardiovascular: No chest pain or palpitations  Gastrointestinal: No nausea or vomiting  Neurological: No confusion or weakness    OBJECTIVE:     Vital Signs Range (Last 24H):  Visit Vitals    BP (!) 142/63    Pulse 87    Temp 98.6 °F (37 °C)    Resp 18    Ht 5' 4" (1.626 m)    Wt 60.4 kg (133 lb 2.5 oz)    SpO2 96%    Breastfeeding No    BMI 22.86 kg/m2       Temp:  [97.9 °F (36.6 °C)-98.6 °F (37 °C)]   Pulse:  [62-98]   Resp:  [16-18]   BP: ()/(55-71)   SpO2:  [93 %-98 %]     I & O (Last 24H):    Intake/Output Summary (Last 24 hours) at 02/04/17 1307  Last data filed at 02/03/17 2000   Gross per 24 hour   Intake              150 ml   Output                0 ml   Net              150 ml       Physical Exam:  General appearance: elderly, demented.  Eyes: Conjunctivae/corneas clear. PERRL.  Lungs: Normal respiratory effort, clear to auscultation bilaterally   Heart: Regular rate and rhythm, S1, S2 normal, no murmur, rub or julio c.  Abdomen: Soft, non-tender non-distended; bowel sounds normal; no masses, no organomegaly  Extremities: No cyanosis or clubbing. No edema.   Skin: Skin color, texture, turgor normal. No rashes or lesions  Neurologic: Normal strength and tone. No focal numbness or weakness   Dementia  Right arm AVF-minimal swelling     Laboratory Data:    Recent Labs  Lab 02/04/17  0424   WBC 9.00   RBC 3.60*   HGB 11.2*   HCT 33.2*      MCV 92   MCH 31.1*   MCHC 33.7 "       BMP:     Recent Labs  Lab 02/04/17  0424   GLU 91      K 4.2      CO2 25   BUN 51*   CREATININE 2.8*   CALCIUM 7.8*   MG 1.8     Lab Results   Component Value Date    CALCIUM 7.8 (L) 02/04/2017    PHOS 4.1 02/04/2017     Medications:  Medication list was reviewed and changes noted under Assessment/Plan.    Diagnostic Results:        ASSESSMENT/PLAN:     1. Worsening Acute on chronic chronic kidney disease 4 likely secondary to acute illness and influenza with poor oral intake over previous week and has now transitioned to end-stage renal disease (N 18.6, N 17.9, Z 99.2): IStarted dialysis and see history of present illness for events.  Outpatient dialysis arranged at [facility] Monday Wednesday and Friday at 2 PM. Renally dose meds, avoid nephrotoxins, and monitor I/O's closely.  Will monitor renal function through the weekend and decide definitely with family whether to proceed with SCOTT for HD versus conservative care without HD and discharge.    2. 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  She is not on OR schedule for Monday.  As above see where renal function and pt's mentation stabilize over the weekend and then decide on Monday which direction to proceed in.  3. Influenza A (J 10.1): Renally dosed Tamiflu. Continue with symptom management.  4. HTN(I 12.9): Restarted ACE inhibitor.  Added beta blocker and adjusted doses.  5. Hyperparathyroidism (N 25.81): Hold Sensipar for now since calcium low. Continue with calcitriol as now  6. Worsening dementia (F03.90): Patient has had a rapid decline in her mental status despite Aricept.   7. Hypothyroidism(E03.9): Continue with Synthroid     Dispo: D/W son and Dr. Orr at the bedside.    Moisés Larsen MD  Nephrology

## 2017-02-04 NOTE — PLAN OF CARE
Problem: Physical Therapy Goal  Goal: Physical Therapy Goal  Pt needs encouragement.  Ambulated 80' in room

## 2017-02-05 LAB
ANION GAP SERPL CALC-SCNC: 11 MMOL/L
BASOPHILS NFR BLD: 0 %
BUN SERPL-MCNC: 58 MG/DL
CALCIUM SERPL-MCNC: 7.7 MG/DL
CHLORIDE SERPL-SCNC: 103 MMOL/L
CO2 SERPL-SCNC: 23 MMOL/L
CREAT SERPL-MCNC: 3.2 MG/DL
DIFFERENTIAL METHOD: ABNORMAL
EOSINOPHIL NFR BLD: 0 %
ERYTHROCYTE [DISTWIDTH] IN BLOOD BY AUTOMATED COUNT: 13.8 %
EST. GFR  (AFRICAN AMERICAN): 14 ML/MIN/1.73 M^2
EST. GFR  (NON AFRICAN AMERICAN): 12 ML/MIN/1.73 M^2
GLUCOSE SERPL-MCNC: 86 MG/DL
HCT VFR BLD AUTO: 32.1 %
HGB BLD-MCNC: 10.8 G/DL
LYMPHOCYTES NFR BLD: 11 %
MAGNESIUM SERPL-MCNC: 1.8 MG/DL
MCH RBC QN AUTO: 30.9 PG
MCHC RBC AUTO-ENTMCNC: 33.6 %
MCV RBC AUTO: 92 FL
MONOCYTES NFR BLD: 13 %
MYELOCYTES NFR BLD MANUAL: 2 %
NEUTROPHILS NFR BLD: 69 %
NEUTS BAND NFR BLD MANUAL: 4 %
PHOSPHATE SERPL-MCNC: 3.6 MG/DL
PLATELET # BLD AUTO: 219 K/UL
PLATELET BLD QL SMEAR: ABNORMAL
PMV BLD AUTO: 10.9 FL
POTASSIUM SERPL-SCNC: 4.3 MMOL/L
PROMYELOCYTES NFR BLD MANUAL: 1 %
RBC # BLD AUTO: 3.5 M/UL
SODIUM SERPL-SCNC: 137 MMOL/L
WBC # BLD AUTO: 9.21 K/UL

## 2017-02-05 PROCEDURE — 25000003 PHARM REV CODE 250: Performed by: INTERNAL MEDICINE

## 2017-02-05 PROCEDURE — 11000001 HC ACUTE MED/SURG PRIVATE ROOM

## 2017-02-05 PROCEDURE — 25000242 PHARM REV CODE 250 ALT 637 W/ HCPCS: Performed by: INTERNAL MEDICINE

## 2017-02-05 PROCEDURE — 83735 ASSAY OF MAGNESIUM: CPT

## 2017-02-05 PROCEDURE — 85027 COMPLETE CBC AUTOMATED: CPT

## 2017-02-05 PROCEDURE — 36415 COLL VENOUS BLD VENIPUNCTURE: CPT

## 2017-02-05 PROCEDURE — 94640 AIRWAY INHALATION TREATMENT: CPT

## 2017-02-05 PROCEDURE — 99232 SBSQ HOSP IP/OBS MODERATE 35: CPT | Mod: ,,, | Performed by: HOSPITALIST

## 2017-02-05 PROCEDURE — 25000003 PHARM REV CODE 250: Performed by: NURSE PRACTITIONER

## 2017-02-05 PROCEDURE — 25000003 PHARM REV CODE 250: Performed by: PHYSICIAN ASSISTANT

## 2017-02-05 PROCEDURE — 85007 BL SMEAR W/DIFF WBC COUNT: CPT

## 2017-02-05 PROCEDURE — 84100 ASSAY OF PHOSPHORUS: CPT

## 2017-02-05 PROCEDURE — 25000003 PHARM REV CODE 250: Performed by: HOSPITALIST

## 2017-02-05 PROCEDURE — 80048 BASIC METABOLIC PNL TOTAL CA: CPT

## 2017-02-05 RX ADMIN — IPRATROPIUM BROMIDE AND ALBUTEROL SULFATE 3 ML: .5; 3 SOLUTION RESPIRATORY (INHALATION) at 02:02

## 2017-02-05 RX ADMIN — STANDARDIZED SENNA CONCENTRATE AND DOCUSATE SODIUM 1 TABLET: 8.6; 5 TABLET, FILM COATED ORAL at 10:02

## 2017-02-05 RX ADMIN — HEPARIN SODIUM 5000 UNITS: 5000 INJECTION, SOLUTION INTRAVENOUS; SUBCUTANEOUS at 01:02

## 2017-02-05 RX ADMIN — CALCITRIOL 0.25 MCG: 0.25 CAPSULE, LIQUID FILLED ORAL at 09:02

## 2017-02-05 RX ADMIN — IPRATROPIUM BROMIDE AND ALBUTEROL SULFATE 3 ML: .5; 3 SOLUTION RESPIRATORY (INHALATION) at 07:02

## 2017-02-05 RX ADMIN — CARVEDILOL 6.25 MG: 6.25 TABLET, FILM COATED ORAL at 05:02

## 2017-02-05 RX ADMIN — DONEPEZIL HYDROCHLORIDE 10 MG: 5 TABLET, FILM COATED ORAL at 09:02

## 2017-02-05 RX ADMIN — LEVOTHYROXINE SODIUM 150 MCG: 75 TABLET ORAL at 05:02

## 2017-02-05 RX ADMIN — FAMOTIDINE 20 MG: 20 TABLET, FILM COATED ORAL at 10:02

## 2017-02-05 RX ADMIN — Medication 1 CAPSULE: at 09:02

## 2017-02-05 RX ADMIN — CARVEDILOL 6.25 MG: 6.25 TABLET, FILM COATED ORAL at 09:02

## 2017-02-05 RX ADMIN — ASPIRIN 81 MG: 81 TABLET, COATED ORAL at 09:02

## 2017-02-05 RX ADMIN — QUETIAPINE FUMARATE 25 MG: 25 TABLET, FILM COATED ORAL at 09:02

## 2017-02-05 RX ADMIN — HEPARIN SODIUM 5000 UNITS: 5000 INJECTION, SOLUTION INTRAVENOUS; SUBCUTANEOUS at 10:02

## 2017-02-05 RX ADMIN — OSELTAMIVIR PHOSPHATE 30 MG: 6 POWDER, FOR SUSPENSION ORAL at 09:02

## 2017-02-05 RX ADMIN — FAMOTIDINE 20 MG: 20 TABLET, FILM COATED ORAL at 09:02

## 2017-02-05 RX ADMIN — HEPARIN SODIUM 5000 UNITS: 5000 INJECTION, SOLUTION INTRAVENOUS; SUBCUTANEOUS at 05:02

## 2017-02-05 RX ADMIN — LISINOPRIL 10 MG: 10 TABLET ORAL at 09:02

## 2017-02-05 RX ADMIN — FLUOXETINE 10 MG: 10 CAPSULE ORAL at 09:02

## 2017-02-05 NOTE — PLAN OF CARE
Problem: Patient Care Overview  Goal: Plan of Care Review  Outcome: Ongoing (interventions implemented as appropriate)  Tolerates rx well, no acute distress noted.

## 2017-02-05 NOTE — PLAN OF CARE
Problem: Patient Care Overview  Goal: Individualization & Mutuality  Outcome: Ongoing (interventions implemented as appropriate)  Bed in low and locked position and able to reposition per self and up with walker and assist.  Remains free of injury during shift.

## 2017-02-05 NOTE — PLAN OF CARE
Problem: Patient Care Overview  Goal: Plan of Care Review  Outcome: Ongoing (interventions implemented as appropriate)  Patient received on RA SAT 94%, will continue to monitor.

## 2017-02-05 NOTE — NURSING
No significant events overnight. Remains free from fall, injury, and skin breakdown. Voiding via restroom. Ambulates with walker and x1 standby assistance. VSS on RA throughout the night. Denies pain. Tele maintained; all alarms active and audible. TEDs in place. IS encouraged. Droplet precautions maintained. Plan of care reviewed with patient and all questions answered. Bed low, locked w/ bed alarm on. Call light within reach. Purposeful rounding performed. Resting comfortably in bed, no other complaints at this time.

## 2017-02-05 NOTE — PROGRESS NOTES
Progress Note  [facility] Medicine      Admit Date: 1/30/2017 (LOS: 3)    SUBJECTIVE:     Follow-up For:  Influenza A    HPI/Interval history: No acute events overnight.  Patient and family report patient did not sleep well last night.   AV fistula infiltrated on Friday.  No bleeding from site.  No acute events.    Review of Systems: Patient denies chest pain, shortness of breath.  No fevers, chills.  No nausea, vomiting, or diarrhea.    Medications: Reviewed and documented in the MAR.    OBJECTIVE:     Vital Signs (Most Recent)  Temp: 98 °F (36.7 °C) (02/05/17 0500)  Pulse: 73 (02/05/17 0726)  Resp: 18 (02/05/17 0726)  BP: (!) 144/65 (02/05/17 0500)  SpO2: (!) 94 % (02/05/17 0726)    Vital Signs Range (Last 24H):  Temp:  [97.9 °F (36.6 °C)-98.8 °F (37.1 °C)]   Pulse:  [20-92]   Resp:  [18-20]   BP: (122-144)/(61-65)   SpO2:  [93 %-96 %]     I & O (Last 24H):    Intake/Output Summary (Last 24 hours) at 02/05/17 0732  Last data filed at 02/05/17 0545   Gross per 24 hour   Intake              240 ml   Output              825 ml   Net             -585 ml       BMI: Body mass index is 22.86 kg/(m^2).    Physical Exam:  General: Patient is awake, alert, and oriented to self but not year or place, and in no acute distress.  Eyes: Pupils equal, round, and reactive to light, anicteric sclera.  Mouth: No lesions, moist mucous membranes.  Respiratory: Lungs clear to ausculation, no crackles or wheezing.  Cardiovascular: Regular rate and rythmn, no murmurs appreciated.  Abdomen: Soft, non-tender, non-distended, normal bowel sounds.  Skin: No rashes or breakdown.  Extremities: No edema, cyanosis, or clubbing.  Right upper extremity fistula with mild swelling, no tenderness or bleeding.  Neuro: No focal weakness.    Diagnostic Results:  CBC:    Recent Labs  Lab 02/03/17  0550 02/04/17  0424 02/05/17  0434   WBC 8.94 9.00 9.21   HGB 12.1 11.2* 10.8*   HCT 35.8* 33.2* 32.1*    202 219   MCV 92 92 92     BMP:    Recent  Labs  Lab 02/03/17  0550 02/04/17  0424 02/05/17  0434    91 86    138 137   K 4.3 4.2 4.3    103 103   CO2 25 25 23   BUN 47* 51* 58*   CREATININE 2.5* 2.8* 3.2*   CALCIUM 7.8* 7.8* 7.7*   MG 1.7 1.8 1.8   PHOS 3.2 4.1 3.6       ASSESSMENT/PLAN:     Active Hospital Problems    Diagnosis  POA    *Influenza A [J10.1]  Yes    Problem with dialysis access [T82.898A]  No    Acute congestive heart failure [I50.9]  Yes    Hyperkalemia [E87.5]  Yes    Hypochloremia [E87.8]  Yes    Hypocalcemia [E83.51]  Yes    Hypothyroidism [E03.9]  Yes     1990: Diagnosed.      Hypertension [I10]  Yes     2000: Diagnosed.      Chronic kidney disease, stage IV (severe) [N18.4]  Yes     11/15/2012: BUN/crea 38/2.1. CrCl 22.  Right arm fistula.        Resolved Hospital Problems    Diagnosis Date Resolved POA   No resolved problems to display.       Acute kidney injury with history of chronic kidney disease, stage IV (severe).  Volume status and electrolytes okay.  Serum creatinine rising.  Fistula infiltrated.  Possible tunneled central venous catheter tomorrow.    Influenza A infection.  Continue with oseltamivir and supportive care.  Droplet isolation.     Hypertension.  Reasonably controlled with current regimen.  Will continue with current regimen and continue to monitor.    Hypothyroidism.  Continue with home dose of levothyroxine.    DVT prophylaxis.  Subcutaneous heparin.

## 2017-02-05 NOTE — PROGRESS NOTES
"Nephrology  Progress Note    Admit Date: 1/30/2017   LOS: 3 days     SUBJECTIVE:     Follow-up For:  Influenza A/CKD IV    Interval History:    No overnight events.  Pt intermittently confused.  Grandson at he bedside.  Renal function continues to decline.  Pt has no complaints, waiting on breakfast to be delivered.    Review of Systems:  Constitutional: No fever or chills  Respiratory: No cough or shortness of breath  Cardiovascular: No chest pain or palpitations  Gastrointestinal: No nausea or vomiting  Neurological: No confusion or weakness    OBJECTIVE:     Vital Signs Range (Last 24H):  Visit Vitals    BP (!) 144/65 (BP Location: Left arm, Patient Position: Sitting, BP Method: Automatic)    Pulse 71    Temp 98 °F (36.7 °C) (Oral)    Resp 18    Ht 5' 4" (1.626 m)    Wt 60.4 kg (133 lb 2.5 oz)    SpO2 (!) 94%    Breastfeeding No    BMI 22.86 kg/m2       Temp:  [98 °F (36.7 °C)-98.8 °F (37.1 °C)]   Pulse:  [20-92]   Resp:  [18-20]   BP: (135-144)/(61-65)   SpO2:  [94 %-96 %]     I & O (Last 24H):    Intake/Output Summary (Last 24 hours) at 02/05/17 0854  Last data filed at 02/05/17 0545   Gross per 24 hour   Intake              240 ml   Output              825 ml   Net             -585 ml       Physical Exam:  General appearance: elderly, demented.  Eyes: Conjunctivae/corneas clear. PERRL.  Lungs: Normal respiratory effort, clear to auscultation bilaterally   Heart: Regular rate and rhythm, S1, S2 normal, no murmur, rub or julio c.  Abdomen: Soft, non-tender non-distended; bowel sounds normal; no masses, no organomegaly  Extremities: No cyanosis or clubbing. No edema.   Skin: Skin color, texture, turgor normal. No rashes or lesions  Neurologic: Normal strength and tone. No focal numbness or weakness   Dementia  Right arm AVF- good thrill, no swelling     Laboratory Data:    Recent Labs  Lab 02/05/17  0434   WBC 9.21   RBC 3.50*   HGB 10.8*   HCT 32.1*      MCV 92   MCH 30.9   MCHC 33.6       BMP: "     Recent Labs  Lab 02/05/17  0434   GLU 86      K 4.3      CO2 23   BUN 58*   CREATININE 3.2*   CALCIUM 7.7*   MG 1.8     Lab Results   Component Value Date    CALCIUM 7.7 (L) 02/05/2017    PHOS 3.6 02/05/2017     Medications:  Medication list was reviewed and changes noted under Assessment/Plan.    Diagnostic Results: Reviewed    ASSESSMENT/PLAN:     1. Worsening Acute on chronic chronic kidney disease 4 likely secondary to acute illness and influenza with poor oral intake over previous week and has now transitioned to end-stage renal disease (N 18.6, N 17.9, Z 99.2): Started dialysis and see history of present illness for events.  Outpatient dialysis arranged at [facility] Monday Wednesday and Friday at 2 PM. Renally dose meds, avoid nephrotoxins, and monitor I/O's closely.  Will monitor renal function through the weekend and decide definitely with family whether to proceed with SCOTT for HD versus conservative care without HD and discharge.  I suspect she will not tolerate HD long-term, but family wants to give it a trial as outpatient.  2. 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    As above see where renal function and pt's mentation stabilize over the weekend and then decide on Monday which direction to proceed in.  Will keep NPO after midnight in case they decide to proceed with SCOTT.  AVF is looking better today.  3. Influenza A (J 10.1): Renally dosed Tamiflu. Continue with symptom management.  4. HTN(I 12.9): Restarted ACE inhibitor.  Added beta blocker and adjusted doses.  5. Hyperparathyroidism (N 25.81): Hold Sensipar for now since calcium low. Continue with calcitriol as now  6. Worsening dementia (F03.90): Patient has had a rapid decline in her mental status despite Aricept.   7. Hypothyroidism(E03.9): Continue with Synthroid     Dispo: D/W mary and Dr. Orr at the bedside.    Moisés Larsen  MD  Nephrology

## 2017-02-06 VITALS
RESPIRATION RATE: 18 BRPM | DIASTOLIC BLOOD PRESSURE: 52 MMHG | BODY MASS INDEX: 22.74 KG/M2 | HEART RATE: 98 BPM | SYSTOLIC BLOOD PRESSURE: 104 MMHG | HEIGHT: 64 IN | TEMPERATURE: 99 F | OXYGEN SATURATION: 95 % | WEIGHT: 133.19 LBS

## 2017-02-06 LAB
ANION GAP SERPL CALC-SCNC: 12 MMOL/L
ANISOCYTOSIS BLD QL SMEAR: SLIGHT
BASOPHILS # BLD AUTO: ABNORMAL K/UL
BASOPHILS NFR BLD: 0 %
BUN SERPL-MCNC: 64 MG/DL
CALCIUM SERPL-MCNC: 8 MG/DL
CHLORIDE SERPL-SCNC: 104 MMOL/L
CO2 SERPL-SCNC: 21 MMOL/L
CREAT SERPL-MCNC: 3.1 MG/DL
DIFFERENTIAL METHOD: ABNORMAL
EOSINOPHIL # BLD AUTO: ABNORMAL K/UL
EOSINOPHIL NFR BLD: 3 %
ERYTHROCYTE [DISTWIDTH] IN BLOOD BY AUTOMATED COUNT: 13.8 %
EST. GFR  (AFRICAN AMERICAN): 15 ML/MIN/1.73 M^2
EST. GFR  (NON AFRICAN AMERICAN): 13 ML/MIN/1.73 M^2
GLUCOSE SERPL-MCNC: 87 MG/DL
HCT VFR BLD AUTO: 32.5 %
HGB BLD-MCNC: 11.2 G/DL
LYMPHOCYTES # BLD AUTO: ABNORMAL K/UL
LYMPHOCYTES NFR BLD: 14 %
MAGNESIUM SERPL-MCNC: 1.9 MG/DL
MCH RBC QN AUTO: 31.5 PG
MCHC RBC AUTO-ENTMCNC: 34.5 %
MCV RBC AUTO: 92 FL
MONOCYTES # BLD AUTO: ABNORMAL K/UL
MONOCYTES NFR BLD: 9 %
MYELOCYTES NFR BLD MANUAL: 1 %
NEUTROPHILS NFR BLD: 67 %
NEUTS BAND NFR BLD MANUAL: 6 %
PHOSPHATE SERPL-MCNC: 3.8 MG/DL
PLATELET # BLD AUTO: 264 K/UL
PLATELET BLD QL SMEAR: ABNORMAL
PMV BLD AUTO: 10.6 FL
POLYCHROMASIA BLD QL SMEAR: ABNORMAL
POTASSIUM SERPL-SCNC: 4.3 MMOL/L
RBC # BLD AUTO: 3.55 M/UL
SODIUM SERPL-SCNC: 137 MMOL/L
WBC # BLD AUTO: 8.92 K/UL

## 2017-02-06 PROCEDURE — 25000003 PHARM REV CODE 250: Performed by: HOSPITALIST

## 2017-02-06 PROCEDURE — 25000003 PHARM REV CODE 250: Performed by: NURSE PRACTITIONER

## 2017-02-06 PROCEDURE — 25000003 PHARM REV CODE 250: Performed by: PHYSICIAN ASSISTANT

## 2017-02-06 PROCEDURE — 83735 ASSAY OF MAGNESIUM: CPT

## 2017-02-06 PROCEDURE — 36415 COLL VENOUS BLD VENIPUNCTURE: CPT

## 2017-02-06 PROCEDURE — 85027 COMPLETE CBC AUTOMATED: CPT

## 2017-02-06 PROCEDURE — 97110 THERAPEUTIC EXERCISES: CPT

## 2017-02-06 PROCEDURE — 84100 ASSAY OF PHOSPHORUS: CPT

## 2017-02-06 PROCEDURE — 85007 BL SMEAR W/DIFF WBC COUNT: CPT

## 2017-02-06 PROCEDURE — 25000242 PHARM REV CODE 250 ALT 637 W/ HCPCS: Performed by: INTERNAL MEDICINE

## 2017-02-06 PROCEDURE — 25000003 PHARM REV CODE 250: Performed by: INTERNAL MEDICINE

## 2017-02-06 PROCEDURE — 99239 HOSP IP/OBS DSCHRG MGMT >30: CPT | Mod: ,,, | Performed by: HOSPITALIST

## 2017-02-06 PROCEDURE — 94640 AIRWAY INHALATION TREATMENT: CPT

## 2017-02-06 PROCEDURE — 97116 GAIT TRAINING THERAPY: CPT

## 2017-02-06 PROCEDURE — 80048 BASIC METABOLIC PNL TOTAL CA: CPT

## 2017-02-06 RX ORDER — AMOXICILLIN 250 MG
1 CAPSULE ORAL 2 TIMES DAILY
COMMUNITY
Start: 2017-02-06 | End: 2017-02-08

## 2017-02-06 RX ORDER — GUAIFENESIN 100 MG/5ML
200 SOLUTION ORAL EVERY 4 HOURS PRN
Refills: 0 | COMMUNITY
Start: 2017-02-06 | End: 2017-02-16

## 2017-02-06 RX ORDER — LISINOPRIL 10 MG/1
10 TABLET ORAL DAILY
Qty: 30 TABLET | Refills: 0 | Status: SHIPPED | OUTPATIENT
Start: 2017-02-06 | End: 2017-02-08

## 2017-02-06 RX ORDER — CARVEDILOL 6.25 MG/1
6.25 TABLET ORAL 2 TIMES DAILY WITH MEALS
Qty: 60 TABLET | Refills: 0 | Status: SHIPPED | OUTPATIENT
Start: 2017-02-06 | End: 2017-02-08

## 2017-02-06 RX ADMIN — IPRATROPIUM BROMIDE AND ALBUTEROL SULFATE 3 ML: .5; 3 SOLUTION RESPIRATORY (INHALATION) at 07:02

## 2017-02-06 RX ADMIN — QUETIAPINE FUMARATE 25 MG: 25 TABLET, FILM COATED ORAL at 09:02

## 2017-02-06 RX ADMIN — CALCITRIOL 0.25 MCG: 0.25 CAPSULE, LIQUID FILLED ORAL at 09:02

## 2017-02-06 RX ADMIN — LEVOTHYROXINE SODIUM 150 MCG: 75 TABLET ORAL at 06:02

## 2017-02-06 RX ADMIN — DONEPEZIL HYDROCHLORIDE 10 MG: 5 TABLET, FILM COATED ORAL at 09:02

## 2017-02-06 RX ADMIN — STANDARDIZED SENNA CONCENTRATE AND DOCUSATE SODIUM 1 TABLET: 8.6; 5 TABLET, FILM COATED ORAL at 09:02

## 2017-02-06 RX ADMIN — FAMOTIDINE 20 MG: 20 TABLET, FILM COATED ORAL at 09:02

## 2017-02-06 RX ADMIN — IPRATROPIUM BROMIDE AND ALBUTEROL SULFATE 3 ML: .5; 3 SOLUTION RESPIRATORY (INHALATION) at 01:02

## 2017-02-06 RX ADMIN — FLUOXETINE 10 MG: 10 CAPSULE ORAL at 09:02

## 2017-02-06 RX ADMIN — CARVEDILOL 6.25 MG: 6.25 TABLET, FILM COATED ORAL at 09:02

## 2017-02-06 RX ADMIN — ASPIRIN 81 MG: 81 TABLET, COATED ORAL at 09:02

## 2017-02-06 RX ADMIN — Medication 1 CAPSULE: at 09:02

## 2017-02-06 RX ADMIN — HEPARIN SODIUM 5000 UNITS: 5000 INJECTION, SOLUTION INTRAVENOUS; SUBCUTANEOUS at 06:02

## 2017-02-06 RX ADMIN — LISINOPRIL 10 MG: 10 TABLET ORAL at 09:02

## 2017-02-06 NOTE — NURSING
Pt is for discharge to home Tele monitor removed and returned to monitor tech.  IV access removed pressure applied. No bleeding noted. Gauze applied to site secured with coban. discharge instructions discussed with patient's family, they verbalized understanding

## 2017-02-06 NOTE — PLAN OF CARE
Pt is discharging home today with [facility] , [phone].  Family to transport pt home in private vehicle.  Family to schedule follow-up apt with Dr. Arvizu.  No further CM needs at this time.     02/06/17 1441   Final Note   Assessment Type Final Discharge Note   Discharge Disposition Home-Health   Discharge planning education complete? Yes  (with family)   What phone number can be called within the next 1-3 days to see how you are doing after discharge? [phone]   Hospital Follow Up  Appt(s) scheduled? Yes   Discharge plans and expectations educations in teach back method with documentation complete? Yes  (with family)   Offered [facility] -- Bedside Delivery? n/a   Discharge/Hospital Encounter Summary to (non-[facility]) PCP Yes   Referral to Outpatient Case Management complete? n/a   Referral to / orders for Home Health Complete? Yes   30 day supply of medicines given at discharge, if documented non-compliance / non-adherence? n/a   Any social issues identified prior to discharge? No   Did you assess the readiness or willingness of the family or caregiver to support self management of care? Yes  (with family)

## 2017-02-06 NOTE — PLAN OF CARE
Problem: Patient Care Overview  Goal: Plan of Care Review  Outcome: Ongoing (interventions implemented as appropriate)  Patient on room air; aerosol treatments given as scheduled. No change in therapy.

## 2017-02-06 NOTE — PROGRESS NOTES
Physical Therapy Discharge Summary    Mary Singleton  MRN: [id number]   Influenza A   Patient Discharged from acute Physical Therapy on 2017.  Please refer to prior PT noted date on 2017 for functional status.     Assessment:   Patient has not met goals. Secondary to hospital discharge  GOALS:   Physical Therapy Goals        Problem: Physical Therapy Goal    Goal Priority Disciplines Outcome Goal Variances Interventions   Physical Therapy Goal     PT/OT, PT Ongoing (interventions implemented as appropriate)     Description:  Goals to be met by: 17     Patient will increase functional independence with mobility by performin. Sit to stand transfer with S   2. Gait  x 150 feet with S using Rolling Walker.   3. Ascend/Descend 6 inch curb step with Contact Guard Assistance using Rolling Walker.               Problem: Physical Therapy Goal    Goal Priority Disciplines Outcome Goal Variances Interventions   Physical Therapy Goal     PT/OT, PT              Reasons for Discontinuation of Therapy Services  Transfer to alternate level of care.      Plan:  Patient Discharged to: Home with Home Health Service.  PT of record not available for documentation.

## 2017-02-06 NOTE — PT/OT/SLP PROGRESS
Physical Therapy  Treatment    Mary Singleton   MRN: [id number]   Admitting Diagnosis: Influenza A    PT Received On: 17  PT Start Time: 929     PT Stop Time: 950    PT Total Time (min): 21 min       Billable Minutes:  Gait [id number] and Therapeutic Exercise 10    Treatment Type: Treatment  PT/PTA: PTA     PTA Visit Number: 4       General Precautions: Standard, fall  Orthopedic Precautions: N/A   Braces:      Do you have any cultural, spiritual, Church conflicts, given your current situation?: none    Subjective:  Communicated with nurse prior to session. Patient complained of not wanting to be here. Pt. Reported home    Pain Ratin/10   Pain Rating Post-Intervention: 0/10    Objective:   Patient found with: telemetry, peripheral IV seated in bedside chair with family    Functional Mobility:  Bed Mobility:     NT already seated in bedside chair     Transfers: tactile cues for hand placement   Sit <> Stand Assistance: Contact Guard Assistance  Sit <> Stand Assistive Device: Rolling Walker    Gait:  Required CGA for safety and patient report I don't want to go far (oxygen saturation was 94% HR 99/100 upon returning from walk)  Gait Distance: 110 feet  Gait Assistive Device: Rolling walker  Gait Pattern: reciprocal  Gait Deviation(s): decreased bryan, decreased step length, decreased weight-shifting ability    Therapeutic Activities and Exercises:  Patient performed AP, LAQ, Hip Flexion, Hip Abd/Add X 15 reps AAROM required constant verbal and tactile cues to perform task secondary to impaired cognition    AM-PAC 6 CLICK MOBILITY  How much help from another person does this patient currently need?   1 = Unable, Total/Dependent Assistance  2 = A lot, Maximum/Moderate Assistance  3 = A little, Minimum/Contact Guard/Supervision  4 = None, Modified [place]/Independent    Turning over in bed (including adjusting bedclothes, sheets and blankets)?: 4  Sitting down on and standing up from a chair with  arms (e.g., wheelchair, bedside commode, etc.): 3  Moving from lying on back to sitting on the side of the bed?: 3  Moving to and from a bed to a chair (including a wheelchair)?: 3  Need to walk in hospital room?: 3  Climbing 3-5 steps with a railing?: 3  Total Score: 19    AM-PAC Raw Score CMS G-Code Modifier Level of Impairment Assistance   6 % Total / Unable   7 - 9 CM 80 - 100% Maximal Assist   10 - 14 CL 60 - 80% Moderate Assist   15 - 19 CK 40 - 60% Moderate Assist   20 - 22 CJ 20 - 40% Minimal Assist   23 CI 1-20% SBA / CGA   24 CH 0% Independent/ Mod I     Patient left up in chair with all lines intact, call button in reach, nurse notified and family and NP  present.    Assessment:  Mary Singleton is a 89 y.o. female with a medical diagnosis of Influenza A patient will need  supervision upon discharge secondary to patients cognitive status. Patient will cont. To benefit from HHPT upon discharge to improve strength, endurance and functional mobility.     Rehab identified problem list/impairments: Rehab identified problem list/impairments: weakness, gait instability, impaired functional mobilty, impaired endurance, impaired cognition, decreased safety awareness    Rehab potential is fair.    Activity tolerance: Fair    Discharge recommendations: Discharge Facility/Level Of Care Needs: home health PT     Barriers to discharge: Barriers to Discharge: None (will stay with daughter  for safety secondary to patients cognitive status)    Equipment recommendations: Equipment Needed After Discharge: wheelchair standard size wheelchair    GOALS:   Physical Therapy Goals        Problem: Physical Therapy Goal    Goal Priority Disciplines Outcome Goal Variances Interventions   Physical Therapy Goal     PT/OT, PT Ongoing (interventions implemented as appropriate)     Description:  Goals to be met by: 17     Patient will increase functional independence with mobility by performin. Sit to stand  transfer with S  2. Gait  x 150 feet with S using Rolling Walker.   3. Ascend/Descend 6 inch curb step with Contact Guard Assistance using Rolling Walker.              Problem: Physical Therapy Goal    Goal Priority Disciplines Outcome Goal Variances Interventions   Physical Therapy Goal     PT/OT, PT                PLAN:    Patient to be seen 6 x/week  to address the above listed problems via gait training, therapeutic activities, therapeutic exercises  Plan of Care expires: 03/02/17  Plan of Care reviewed with: patient, family         Telma Bell, PTA  02/06/2017

## 2017-02-06 NOTE — PLAN OF CARE
Problem: Patient Care Overview  Goal: Plan of Care Review  Outcome: Ongoing (interventions implemented as appropriate)  Therapy tolerated well, no acute distress noted.

## 2017-02-06 NOTE — DISCHARGE SUMMARY
[facility]  Discharge Summary      Admit Date: 1/30/2017    Discharge Date and Time:  02/06/2017 11:12 AM    Attending Physician: Juanpablo Orr MD     Reason for Admission: Influenza infection and acute on chronic kidney failure    Hospital Course: Patient is a 89 year woman with a history of hypertension, coronary artery disease, hypothyroidism, chronic kidney disease stage IV, and dementia who presented to the hospital with influenza and acute kidney injury.  Patient treated with oseltamivir with improvement in her respiratory status however her kidney function declined further.  Patient had a right arm fistula in place for a number of years anticipating the need for hemodialysis.  Patient started on hemodialysis in the hospital however due to her dementia she did not tolerate it well and her fistula infiltrated.  Consideration was given for possible placement of tunneled catheter however the nephrology service recommended that the patient go home with home health and follow-up with Dr. Susana Arvizu in clinic this Wednesday for re-evaluation and consideration of possible trial of hemodialysis again verus consideration of hospice.    Consults: Nephrology    Final Diagnoses:    Principal Problem: Influenza A   Secondary Diagnoses:   Active Hospital Problems    Diagnosis  POA    *Influenza A [J10.1]  Yes    Problem with dialysis access [T82.898A]  No    Acute congestive heart failure [I50.9]  Yes    Hyperkalemia [E87.5]  Yes    Hypochloremia [E87.8]  Yes    Hypocalcemia [E83.51]  Yes    Hypothyroidism [E03.9]  Yes     1990: Diagnosed.      Hypertension [I10]  Yes     2000: Diagnosed.      Chronic kidney disease, stage IV (severe) [N18.4]  Yes     11/15/2012: BUN/crea 38/2.1. CrCl 22.  Right arm fistula.        Resolved Hospital Problems    Diagnosis Date Resolved POA   No resolved problems to display.       Discharged Condition: Stable    Disposition: Home-Health Care Svc    Follow  Up/Patient Instructions:     Medications:  Reconciled Home Medications:   Current Discharge Medication List      START taking these medications    Details   carvedilol (COREG) 6.25 MG tablet Take 1 tablet (6.25 mg total) by mouth 2 (two) times daily with meals.  Qty: 60 tablet, Refills: 0      guaifenesin 100 mg/5 ml (ROBITUSSIN) 100 mg/5 mL syrup Take 10 mLs (200 mg total) by mouth every 4 (four) hours as needed for Cough or Congestion.  Refills: 0      senna-docusate 8.6-50 mg (PERICOLACE) 8.6-50 mg per tablet Take 1 tablet by mouth 2 (two) times daily.         CONTINUE these medications which have CHANGED    Details   lisinopril 10 MG tablet Take 1 tablet (10 mg total) by mouth once daily.  Qty: 30 tablet, Refills: 0         CONTINUE these medications which have NOT CHANGED    Details   acetaminophen (TYLENOL) 500 mg Cap       aspirin (ECOTRIN) 81 MG EC tablet Take 1 tablet (81 mg total) by mouth once daily.  Qty: 90 tablet, Refills: 3    Associated Diagnoses: Bilateral carotid artery stenosis      donepezil (ARICEPT) 10 MG tablet Take 10 mg by mouth once daily.       fluoxetine (PROZAC) 10 MG capsule Take 10 mg by mouth once daily.      levothyroxine (SYNTHROID) 150 MCG tablet Take 150 mcg by mouth once daily.      quetiapine (SEROQUEL) 25 MG Tab Take 25 mg by mouth once daily.      calcitRIOL (ROCALTROL) 0.25 MCG Cap Take 0.25 mcg by mouth once daily.       oxycodone-acetaminophen (PERCOCET) 5-325 mg per tablet Take 1 tablet by mouth every 4 (four) hours as needed for Pain.  Qty: 20 tablet, Refills: 0      polyethylene glycol 3350 8.5 gram PwPk          STOP taking these medications       amlodipine (NORVASC) 2.5 MG tablet Comments:   Reason for Stopping:         AVASTIN 25 mg/mL injection Comments:   Reason for Stopping:         famciclovir (FAMVIR) 250 MG Tab Comments:   Reason for Stopping:         megestrol (MEGACE) 400 mg/10 mL (40 mg/mL) Susp Comments:   Reason for Stopping:         SENSIPAR 90 mg Tab  Comments:   Reason for Stopping:         PREVNAR 13, PF, 0.5 mL Syrg Comments:   Reason for Stopping:         PROLIA 60 mg/mL Syrg Comments:   Reason for Stopping:               Discharge Procedure Orders  Diet renal     Activity as tolerated     Call MD for:  temperature >100.4     Call MD for:  persistent nausea and vomiting or diarrhea     Call MD for:  severe uncontrolled pain     Call MD for:  difficulty breathing or increased cough     Call MD for:  severe persistent headache     Call MD for:  worsening rash     Call MD for:  persistent dizziness, light-headedness, or visual disturbances     Call MD for:  increased confusion or weakness       Follow-up Information     Follow up with Susana Arvizu MD. Go on 2/8/2017.    Specialty:  Nephrology    Contact information:    [address] [address] [phone]          Time: 40 minutes spent coordinating and completing discharge.

## 2017-02-06 NOTE — PLAN OF CARE
Problem: Physical Therapy Goal  Goal: Physical Therapy Goal  Goals to be met by: 17     Patient will increase functional independence with mobility by performin. Sit to stand transfer with S   2. Gait x 150 feet with S using Rolling Walker.   3. Ascend/Descend 6 inch curb step with Contact Guard Assistance using Rolling Walker.   Outcome: Ongoing (interventions implemented as appropriate)     Slow progression towards goals

## 2017-02-06 NOTE — PROGRESS NOTES
"Nephrology  Progress Note    Admit Date: 1/30/2017   LOS: 4 days     SUBJECTIVE:     Follow-up For:  Influenza A/CKD IV    Interval History:    Restless night.  Patient extremely weak but no signs of distress.  Walked with physical therapy this a.m.  Denies chest pain or shortness of breath.  Extensive discussion with patient, family, and treatment team.  Multiple options explored but overall consensus is no further dialysis and transition to home with home health for now.  If continues to decline but we'll transition to home hospice.  Has a scheduled appointment this Wednesday with Dr. Arvizu.    Review of Systems:  Constitutional: No fever or chills  Respiratory: No cough or shortness of breath  Cardiovascular: No chest pain or palpitations  Gastrointestinal: No nausea or vomiting  Neurological: No confusion or weakness    OBJECTIVE:     Vital Signs Range (Last 24H):  Visit Vitals    /63 (BP Location: Left arm, Patient Position: Lying, BP Method: Automatic)    Pulse 101    Temp 98.2 °F (36.8 °C) (Oral)    Resp (!) 22    Ht 5' 4" (1.626 m)    Wt 60.4 kg (133 lb 2.5 oz)    SpO2 95%    Breastfeeding No    BMI 22.86 kg/m2       Temp:  [97.8 °F (36.6 °C)-99.2 °F (37.3 °C)]   Pulse:  []   Resp:  [17-22]   BP: (131-164)/(63-64)   SpO2:  [94 %-97 %]     I & O (Last 24H):    Intake/Output Summary (Last 24 hours) at 02/06/17 1027  Last data filed at 02/06/17 0600   Gross per 24 hour   Intake                0 ml   Output             1100 ml   Net            -1100 ml       Physical Exam:  General appearance: elderly, demented.  Eyes: Conjunctivae/corneas clear. PERRL.  Lungs: Normal respiratory effort, clear to auscultation bilaterally.  Few upper respiratory rhonchi.  Heart: Regular rate and rhythm, S1, S2 normal, no murmur, rub or julio c.  Abdomen: Soft, non-tender non-distended; bowel sounds normal; no masses, no organomegaly  Extremities: No cyanosis or clubbing. No edema.   Skin: Skin color, texture, " turgor normal. No rashes or lesions  Neurologic: Normal strength and tone. No focal numbness or weakness   Dementia  Right arm AVF- good thrill, no swelling     Laboratory Data:    Recent Labs  Lab 02/06/17 0427   WBC 8.92   RBC 3.55*   HGB 11.2*   HCT 32.5*      MCV 92   MCH 31.5*   MCHC 34.5       BMP:     Recent Labs  Lab 02/06/17 0427   GLU 87      K 4.3      CO2 21*   BUN 64*   CREATININE 3.1*   CALCIUM 8.0*   MG 1.9     Lab Results   Component Value Date    CALCIUM 8.0 (L) 02/06/2017    PHOS 3.8 02/06/2017     Medications:  Medication list was reviewed and changes noted under Assessment/Plan.    Diagnostic Results: Reviewed    ASSESSMENT/PLAN:     1. Acute on chronic chronic kidney disease 4 likely secondary to acute illness and influenza with poor oral intake over previous week and has now transitioned to end-stage renal disease (N 18.6, N 17.9, Z 99.2): Started dialysis last week but due to patient's dementia and inability to remain still patient infiltrated AV fistula.  Multiple discussions have been held with family about likelihood of this happening as an outpatient.  Discussed potential tunneled catheter but patient likely to either pull it out inadvertently or get infection from patient manipulation.  No urgent need for dialysis at this time and will discuss as an outpatient later this week.  2. AV fistula infiltration secondary to dementia (T 82.898A):  As above see where renal function and pt's mentation stabilize over the week and then decide which direction to proceed in.  AVF is looking better today.  3. Influenza A (J 10.1): Renally dosed Tamiflu. Continue with symptom management.  4. HTN(I 12.9): Restarted ACE inhibitor.  Added beta blocker and adjusted doses.  5. Hyperparathyroidism (N 25.81): Hold Sensipar for now since calcium low. Continue with calcitriol as now  6. Worsening dementia (F03.90): Patient has had a rapid decline in her mental status despite Aricept.    7. Hypothyroidism(E03.9): Continue with Synthroid         >60 min discussion with family/pt about plan of care and overall prognosis.      Dereck Montanez, ACNP  Nephrology

## 2017-02-06 NOTE — PLAN OF CARE
[facility]    HOME HEALTH ORDERS  FACE TO FACE ENCOUNTER    Patient Name: Mary Singleton  YOB: 1927    PCP: Susana Arvizu MD   PCP Address: [address] [place], [place] [id number]  PCP Phone Number: [phone]  PCP Fax: [phone]    Encounter Date: 02/06/2017    Admit to Home Health    Diagnoses:  Active Hospital Problems    Diagnosis  POA    *Influenza A [J10.1]  Yes    Problem with dialysis access [T82.898A]  No    Acute congestive heart failure [I50.9]  Yes    Hyperkalemia [E87.5]  Yes    Hypochloremia [E87.8]  Yes    Hypocalcemia [E83.51]  Yes    Hypothyroidism [E03.9]  Yes     1990: Diagnosed.      Hypertension [I10]  Yes     2000: Diagnosed.      Chronic kidney disease, stage IV (severe) [N18.4]  Yes     11/15/2012: BUN/crea 38/2.1. CrCl 22.  Right arm fistula.        Resolved Hospital Problems    Diagnosis Date Resolved POA   No resolved problems to display.       Future Appointments  Date Time Provider Department Center   5/3/2017 10:30 AM Adrien Lazo MD [email or web address]     Follow-up Information     Follow up with Susana Arvizu MD. Go on 2/8/2017.    Specialty:  Nephrology    Contact information:    [address] [place] [address] [phone]          I have seen and examined this patient face to face today. My clinical findings that support the need for the home health skilled services and home bound status are the following:  Mental confusion making it unsafe for patient to leave home alone due to  Dementia.    Allergies:  Review of patient's allergies indicates:   Allergen Reactions    Penicillins      Doesn't remember it has been years       Diet: renal diet    Activities: activity as tolerated    Nursing:   SN to complete comprehensive assessment including routine vital signs. Instruct on disease process and s/s of complications to report to MD. Review/verify medication list sent home with the patient at time of  discharge  and instruct patient/caregiver as needed. Frequency may be adjusted depending on start of care date.    Notify MD if SBP > 160 or < 90; DBP > 90 or < 50; HR > 120 or < 50; Temp > 101;    CONSULTS:    Physical Therapy to evaluate and treat. Evaluate for home safety and equipment needs; Establish/upgrade home exercise program. Perform / instruct on therapeutic exercises, gait training, transfer training, and Range of Motion.  Occupational Therapy to evaluate and treat. Evaluate home environment for safety and equipment needs. Perform/Instruct on transfers, ADL training, ROM, and therapeutic exercises.   to evaluate for community resources/long-range planning.  Aide to provide assistance with personal care, ADLs, and vital signs.    Medications: Review discharge medications with patient and family and provide education.      Current Discharge Medication List      START taking these medications    Details   carvedilol (COREG) 6.25 MG tablet Take 1 tablet (6.25 mg total) by mouth 2 (two) times daily with meals.  Qty: 60 tablet, Refills: 0      guaifenesin 100 mg/5 ml (ROBITUSSIN) 100 mg/5 mL syrup Take 10 mLs (200 mg total) by mouth every 4 (four) hours as needed for Cough or Congestion.  Refills: 0      senna-docusate 8.6-50 mg (PERICOLACE) 8.6-50 mg per tablet Take 1 tablet by mouth 2 (two) times daily.         CONTINUE these medications which have CHANGED    Details   lisinopril 10 MG tablet Take 1 tablet (10 mg total) by mouth once daily.  Qty: 30 tablet, Refills: 0         CONTINUE these medications which have NOT CHANGED    Details   acetaminophen (TYLENOL) 500 mg Cap       aspirin (ECOTRIN) 81 MG EC tablet Take 1 tablet (81 mg total) by mouth once daily.  Qty: 90 tablet, Refills: 3    Associated Diagnoses: Bilateral carotid artery stenosis      donepezil (ARICEPT) 10 MG tablet Take 10 mg by mouth once daily.       fluoxetine (PROZAC) 10 MG capsule Take 10 mg by mouth once daily.       levothyroxine (SYNTHROID) 150 MCG tablet Take 150 mcg by mouth once daily.      quetiapine (SEROQUEL) 25 MG Tab Take 25 mg by mouth once daily.      calcitRIOL (ROCALTROL) 0.25 MCG Cap Take 0.25 mcg by mouth once daily.       oxycodone-acetaminophen (PERCOCET) 5-325 mg per tablet Take 1 tablet by mouth every 4 (four) hours as needed for Pain.  Qty: 20 tablet, Refills: 0      polyethylene glycol 3350 8.5 gram PwPk          STOP taking these medications       amlodipine (NORVASC) 2.5 MG tablet Comments:   Reason for Stopping:         AVASTIN 25 mg/mL injection Comments:   Reason for Stopping:         famciclovir (FAMVIR) 250 MG Tab Comments:   Reason for Stopping:         megestrol (MEGACE) 400 mg/10 mL (40 mg/mL) Susp Comments:   Reason for Stopping:         SENSIPAR 90 mg Tab Comments:   Reason for Stopping:         PREVNAR 13, PF, 0.5 mL Syrg Comments:   Reason for Stopping:         PROLIA 60 mg/mL Syrg Comments:   Reason for Stopping:               I certify that this patient is confined to her home and needs intermittent skilled nursing care, physical therapy and occupational therapy.

## 2017-02-08 ENCOUNTER — PATIENT OUTREACH (OUTPATIENT)
Dept: ADMINISTRATIVE | Facility: CLINIC | Age: 82
End: 2017-02-08
Payer: MEDICARE

## 2017-02-08 RX ORDER — ACETAMINOPHEN 500 MG
1 TABLET ORAL DAILY
Status: ON HOLD | COMMUNITY
End: 2017-03-08 | Stop reason: HOSPADM

## 2017-02-08 RX ORDER — MEGESTROL ACETATE 40 MG/ML
400 SUSPENSION ORAL DAILY
COMMUNITY
End: 2017-03-05

## 2017-02-08 RX ORDER — CINACALCET 90 MG/1
120 TABLET, FILM COATED ORAL
COMMUNITY
End: 2017-03-05

## 2017-02-08 RX ORDER — AMLODIPINE BESYLATE 2.5 MG/1
2.5 TABLET ORAL DAILY
COMMUNITY
End: 2017-04-18

## 2017-02-08 RX ORDER — CETIRIZINE HYDROCHLORIDE 5 MG/1
5 TABLET ORAL DAILY
COMMUNITY

## 2017-02-08 NOTE — PATIENT INSTRUCTIONS
Discharge Instructions for Acute Kidney Injury  You have been diagnosed with acute kidney injury. This means that your kidneys are not working properly. When both kidneys are healthy, they help filter out fluid and waste from the blood and body. Acute kidney injury has many causes including urinary blockages, infection, lack of enough blood supply, and medications that can injure kidneys. In some cases, acute kidney injury is temporary, lasting several days to a few months, because the kidney has the capacity to repair itself. But, it may also result in chronic kidney disease or end stage renal failure. Here are some instructions for you to follow as you recover.  Home care  · Follow any instructions for eating and drinking given to you by your doctor.  ¨ Drink less fluid, if instructed by your doctor.  ¨ Keep a record of everything you eat and drink.  · Measure the amount of urine and stool you have each day.  · Weigh yourself every day, at the same time of day, and in the same kind of clothes. Keep a daily record of your daily weights.  · Take your temperature every day. Keep a record of the results.  · Learn to take your own blood pressure. Keep a record of your results. Ask your doctor when you should seek emergency medical attention. He or she will tell you which blood pressure reading is dangerous.  · Avoid contact with people who have infections (colds, bronchitis, or skin conditions).  · Practice good personal hygiene. This is especially important if you have a catheter in place when you leave the hospital. Doing so helps keep you safe from infection.  · Take your medications exactly as directed.  · You may require frequent blood and urine tests to monitor your kidney function.  Follow-up care  Make a follow-up appointment as directed by our staff.     When to seek medical care  Call your doctor right away if you have any of the following:  · Signs of bladder infection (urinating more often than usual;  burning, pain, bleeding, or hesitancy when you urinate)  · Signs of infection around your catheter (redness, swelling, warmth, or drainage)  · Rapid weight loss or gain, such as 3 pounds or more in 24 hours or 6 pounds or more in 7 days  · Fever above 100.4°F (38.0°C) or chills  · Muscle aches  · Night sweats  · Very little or no urine output  · Swelling of your hands, legs, or feet  · Back pain  · Abdominal pain  · Extreme tiredness   Date Last Reviewed: 1/6/2015  © 1636-7233 "[employer]. [address] [place] [address]. All rights reserved. This information is not intended as a substitute for professional medical care. Always follow your healthcare professional's instructions.

## 2017-03-05 ENCOUNTER — HOSPITAL ENCOUNTER (OUTPATIENT)
Facility: OTHER | Age: 82
Discharge: HOME | End: 2017-03-09
Attending: EMERGENCY MEDICINE | Admitting: HOSPITALIST
Payer: MEDICARE

## 2017-03-05 DIAGNOSIS — N18.4 CHRONIC KIDNEY DISEASE, STAGE IV (SEVERE): ICD-10-CM

## 2017-03-05 DIAGNOSIS — E83.52 HYPERCALCEMIA: Primary | ICD-10-CM

## 2017-03-05 DIAGNOSIS — G30.1 LATE ONSET ALZHEIMER'S DISEASE WITHOUT BEHAVIORAL DISTURBANCE: ICD-10-CM

## 2017-03-05 DIAGNOSIS — I10 ESSENTIAL HYPERTENSION: ICD-10-CM

## 2017-03-05 DIAGNOSIS — F02.80 LATE ONSET ALZHEIMER'S DISEASE WITHOUT BEHAVIORAL DISTURBANCE: ICD-10-CM

## 2017-03-05 DIAGNOSIS — F32.A DEPRESSION, UNSPECIFIED DEPRESSION TYPE: ICD-10-CM

## 2017-03-05 DIAGNOSIS — R53.1 GENERALIZED WEAKNESS: ICD-10-CM

## 2017-03-05 DIAGNOSIS — E21.3 HYPERPARATHYROIDISM: ICD-10-CM

## 2017-03-05 PROCEDURE — 96361 HYDRATE IV INFUSION ADD-ON: CPT

## 2017-03-05 PROCEDURE — 99284 EMERGENCY DEPT VISIT MOD MDM: CPT | Mod: 25

## 2017-03-05 PROCEDURE — 96374 THER/PROPH/DIAG INJ IV PUSH: CPT

## 2017-03-06 PROBLEM — E83.52 HYPERCALCEMIA: Status: ACTIVE | Noted: 2017-03-06

## 2017-03-06 PROBLEM — R53.1 GENERALIZED WEAKNESS: Status: ACTIVE | Noted: 2017-03-06

## 2017-03-06 LAB
25(OH)D3+25(OH)D2 SERPL-MCNC: 35 NG/ML
ALBUMIN SERPL BCP-MCNC: 3.2 G/DL
ALP SERPL-CCNC: 70 U/L
ALT SERPL W/O P-5'-P-CCNC: 14 U/L
ANION GAP SERPL CALC-SCNC: 10 MMOL/L
ANION GAP SERPL CALC-SCNC: 11 MMOL/L
AST SERPL-CCNC: 21 U/L
BACTERIA #/AREA URNS HPF: NORMAL /HPF
BASOPHILS # BLD AUTO: 0.03 K/UL
BASOPHILS NFR BLD: 0.4 %
BILIRUB SERPL-MCNC: 0.5 MG/DL
BILIRUB UR QL STRIP: NEGATIVE
BUN SERPL-MCNC: 78 MG/DL
BUN SERPL-MCNC: 82 MG/DL
CA-I BLDV-SCNC: 1.47 MMOL/L
CA-I BLDV-SCNC: 1.49 MMOL/L
CALCIUM SERPL-MCNC: 11.8 MG/DL
CALCIUM SERPL-MCNC: 11.9 MG/DL
CHLORIDE SERPL-SCNC: 107 MMOL/L
CHLORIDE SERPL-SCNC: 108 MMOL/L
CLARITY UR: CLEAR
CO2 SERPL-SCNC: 24 MMOL/L
CO2 SERPL-SCNC: 25 MMOL/L
COLOR UR: YELLOW
CREAT SERPL-MCNC: 3 MG/DL
CREAT SERPL-MCNC: 3.1 MG/DL
DIFFERENTIAL METHOD: ABNORMAL
EOSINOPHIL # BLD AUTO: 0.3 K/UL
EOSINOPHIL NFR BLD: 3.8 %
ERYTHROCYTE [DISTWIDTH] IN BLOOD BY AUTOMATED COUNT: 13.4 %
EST. GFR  (AFRICAN AMERICAN): 15 ML/MIN/1.73 M^2
EST. GFR  (AFRICAN AMERICAN): 15 ML/MIN/1.73 M^2
EST. GFR  (NON AFRICAN AMERICAN): 13 ML/MIN/1.73 M^2
EST. GFR  (NON AFRICAN AMERICAN): 13 ML/MIN/1.73 M^2
GLUCOSE SERPL-MCNC: 84 MG/DL
GLUCOSE SERPL-MCNC: 84 MG/DL
GLUCOSE UR QL STRIP: NEGATIVE
HCT VFR BLD AUTO: 33.9 %
HGB BLD-MCNC: 11 G/DL
HGB UR QL STRIP: NEGATIVE
HYALINE CASTS #/AREA URNS LPF: 0 /LPF
KETONES UR QL STRIP: NEGATIVE
LEUKOCYTE ESTERASE UR QL STRIP: NEGATIVE
LYMPHOCYTES # BLD AUTO: 1.2 K/UL
LYMPHOCYTES NFR BLD: 15.7 %
MAGNESIUM SERPL-MCNC: 2.3 MG/DL
MAGNESIUM SERPL-MCNC: 2.4 MG/DL
MCH RBC QN AUTO: 31.4 PG
MCHC RBC AUTO-ENTMCNC: 32.4 %
MCV RBC AUTO: 97 FL
MICROSCOPIC COMMENT: NORMAL
MONOCYTES # BLD AUTO: 0.6 K/UL
MONOCYTES NFR BLD: 7 %
NEUTROPHILS # BLD AUTO: 5.6 K/UL
NEUTROPHILS NFR BLD: 72.3 %
NITRITE UR QL STRIP: NEGATIVE
PH UR STRIP: 7 [PH] (ref 5–8)
PLATELET # BLD AUTO: 242 K/UL
PMV BLD AUTO: 10.8 FL
POTASSIUM SERPL-SCNC: 4.2 MMOL/L
POTASSIUM SERPL-SCNC: 4.9 MMOL/L
PROT SERPL-MCNC: 6.9 G/DL
PROT UR QL STRIP: ABNORMAL
PTH-INTACT SERPL-MCNC: 245.2 PG/ML
RBC # BLD AUTO: 3.5 M/UL
RBC #/AREA URNS HPF: 0 /HPF (ref 0–4)
SODIUM SERPL-SCNC: 142 MMOL/L
SODIUM SERPL-SCNC: 143 MMOL/L
SP GR UR STRIP: 1.01 (ref 1–1.03)
SQUAMOUS #/AREA URNS HPF: 1 /HPF
TSH SERPL DL<=0.005 MIU/L-ACNC: 0.64 UIU/ML
URN SPEC COLLECT METH UR: ABNORMAL
UROBILINOGEN UR STRIP-ACNC: NEGATIVE EU/DL
WBC # BLD AUTO: 7.81 K/UL
WBC #/AREA URNS HPF: 0 /HPF (ref 0–5)
WBC CLUMPS URNS QL MICRO: NORMAL
YEAST URNS QL MICRO: NORMAL

## 2017-03-06 PROCEDURE — 82330 ASSAY OF CALCIUM: CPT

## 2017-03-06 PROCEDURE — 25000003 PHARM REV CODE 250: Performed by: EMERGENCY MEDICINE

## 2017-03-06 PROCEDURE — 82330 ASSAY OF CALCIUM: CPT | Mod: 91

## 2017-03-06 PROCEDURE — 36415 COLL VENOUS BLD VENIPUNCTURE: CPT

## 2017-03-06 PROCEDURE — 80048 BASIC METABOLIC PNL TOTAL CA: CPT

## 2017-03-06 PROCEDURE — G8979 MOBILITY GOAL STATUS: HCPCS | Mod: CJ

## 2017-03-06 PROCEDURE — G0378 HOSPITAL OBSERVATION PER HR: HCPCS

## 2017-03-06 PROCEDURE — 99220 PR INITIAL OBSERVATION CARE,LEVL III: CPT | Mod: ,,, | Performed by: HOSPITALIST

## 2017-03-06 PROCEDURE — 81000 URINALYSIS NONAUTO W/SCOPE: CPT

## 2017-03-06 PROCEDURE — 97530 THERAPEUTIC ACTIVITIES: CPT

## 2017-03-06 PROCEDURE — 82397 CHEMILUMINESCENT ASSAY: CPT

## 2017-03-06 PROCEDURE — 25000003 PHARM REV CODE 250: Performed by: NURSE PRACTITIONER

## 2017-03-06 PROCEDURE — 97162 PT EVAL MOD COMPLEX 30 MIN: CPT

## 2017-03-06 PROCEDURE — 83970 ASSAY OF PARATHORMONE: CPT

## 2017-03-06 PROCEDURE — 97166 OT EVAL MOD COMPLEX 45 MIN: CPT

## 2017-03-06 PROCEDURE — 63600175 PHARM REV CODE 636 W HCPCS: Performed by: EMERGENCY MEDICINE

## 2017-03-06 PROCEDURE — 83735 ASSAY OF MAGNESIUM: CPT | Mod: 91

## 2017-03-06 PROCEDURE — G8987 SELF CARE CURRENT STATUS: HCPCS | Mod: CL

## 2017-03-06 PROCEDURE — 80053 COMPREHEN METABOLIC PANEL: CPT

## 2017-03-06 PROCEDURE — 83735 ASSAY OF MAGNESIUM: CPT

## 2017-03-06 PROCEDURE — G8978 MOBILITY CURRENT STATUS: HCPCS | Mod: CL

## 2017-03-06 PROCEDURE — 82306 VITAMIN D 25 HYDROXY: CPT

## 2017-03-06 PROCEDURE — 84443 ASSAY THYROID STIM HORMONE: CPT

## 2017-03-06 PROCEDURE — 25000003 PHARM REV CODE 250: Performed by: HOSPITALIST

## 2017-03-06 PROCEDURE — G8988 SELF CARE GOAL STATUS: HCPCS | Mod: CK

## 2017-03-06 PROCEDURE — 97535 SELF CARE MNGMENT TRAINING: CPT

## 2017-03-06 PROCEDURE — 25000003 PHARM REV CODE 250: Performed by: INTERNAL MEDICINE

## 2017-03-06 PROCEDURE — 82652 VIT D 1 25-DIHYDROXY: CPT

## 2017-03-06 PROCEDURE — 85025 COMPLETE CBC W/AUTO DIFF WBC: CPT

## 2017-03-06 RX ORDER — QUETIAPINE FUMARATE 25 MG/1
25 TABLET, FILM COATED ORAL DAILY
Status: DISCONTINUED | OUTPATIENT
Start: 2017-03-06 | End: 2017-03-06

## 2017-03-06 RX ORDER — DONEPEZIL HYDROCHLORIDE 5 MG/1
10 TABLET, FILM COATED ORAL DAILY
Status: DISCONTINUED | OUTPATIENT
Start: 2017-03-06 | End: 2017-03-09 | Stop reason: HOSPADM

## 2017-03-06 RX ORDER — FUROSEMIDE 10 MG/ML
40 INJECTION INTRAMUSCULAR; INTRAVENOUS
Status: COMPLETED | OUTPATIENT
Start: 2017-03-06 | End: 2017-03-06

## 2017-03-06 RX ORDER — FUROSEMIDE 10 MG/ML
40 INJECTION INTRAMUSCULAR; INTRAVENOUS EVERY 12 HOURS
Status: DISCONTINUED | OUTPATIENT
Start: 2017-03-07 | End: 2017-03-06

## 2017-03-06 RX ORDER — LEVOTHYROXINE SODIUM 75 UG/1
150 TABLET ORAL
Status: DISCONTINUED | OUTPATIENT
Start: 2017-03-06 | End: 2017-03-09 | Stop reason: HOSPADM

## 2017-03-06 RX ORDER — QUETIAPINE FUMARATE 25 MG/1
25 TABLET, FILM COATED ORAL NIGHTLY
Status: DISCONTINUED | OUTPATIENT
Start: 2017-03-06 | End: 2017-03-09 | Stop reason: HOSPADM

## 2017-03-06 RX ORDER — FLUOXETINE HYDROCHLORIDE 20 MG/1
20 CAPSULE ORAL DAILY
Status: DISCONTINUED | OUTPATIENT
Start: 2017-03-06 | End: 2017-03-09 | Stop reason: HOSPADM

## 2017-03-06 RX ORDER — SODIUM CHLORIDE 9 MG/ML
1000 INJECTION, SOLUTION INTRAVENOUS
Status: COMPLETED | OUTPATIENT
Start: 2017-03-06 | End: 2017-03-06

## 2017-03-06 RX ORDER — AMLODIPINE BESYLATE 5 MG/1
5 TABLET ORAL DAILY
Status: DISCONTINUED | OUTPATIENT
Start: 2017-03-06 | End: 2017-03-09 | Stop reason: HOSPADM

## 2017-03-06 RX ORDER — CETIRIZINE HYDROCHLORIDE 5 MG/1
5 TABLET ORAL DAILY
Status: DISCONTINUED | OUTPATIENT
Start: 2017-03-06 | End: 2017-03-09 | Stop reason: HOSPADM

## 2017-03-06 RX ORDER — CINACALCET 30 MG/1
120 TABLET, FILM COATED ORAL
Status: DISCONTINUED | OUTPATIENT
Start: 2017-03-06 | End: 2017-03-09 | Stop reason: HOSPADM

## 2017-03-06 RX ORDER — AMLODIPINE BESYLATE 2.5 MG/1
2.5 TABLET ORAL DAILY
Status: DISCONTINUED | OUTPATIENT
Start: 2017-03-06 | End: 2017-03-06

## 2017-03-06 RX ORDER — ASPIRIN 81 MG/1
81 TABLET ORAL DAILY
Status: DISCONTINUED | OUTPATIENT
Start: 2017-03-06 | End: 2017-03-09 | Stop reason: HOSPADM

## 2017-03-06 RX ADMIN — AMLODIPINE BESYLATE 5 MG: 5 TABLET ORAL at 09:03

## 2017-03-06 RX ADMIN — SODIUM CHLORIDE 1000 ML: 0.9 INJECTION, SOLUTION INTRAVENOUS at 01:03

## 2017-03-06 RX ADMIN — DONEPEZIL HYDROCHLORIDE 10 MG: 5 TABLET, FILM COATED ORAL at 08:03

## 2017-03-06 RX ADMIN — FUROSEMIDE 40 MG: 10 INJECTION, SOLUTION INTRAMUSCULAR; INTRAVENOUS at 01:03

## 2017-03-06 RX ADMIN — LEVOTHYROXINE SODIUM 150 MCG: 150 TABLET ORAL at 06:03

## 2017-03-06 RX ADMIN — QUETIAPINE FUMARATE 25 MG: 25 TABLET, FILM COATED ORAL at 08:03

## 2017-03-06 RX ADMIN — CINACALCET HYDROCHLORIDE 120 MG: 30 TABLET, COATED ORAL at 08:03

## 2017-03-06 RX ADMIN — ASPIRIN 81 MG: 81 TABLET, COATED ORAL at 08:03

## 2017-03-06 RX ADMIN — AMLODIPINE BESYLATE 2.5 MG: 2.5 TABLET ORAL at 08:03

## 2017-03-06 RX ADMIN — FLUOXETINE 20 MG: 20 CAPSULE ORAL at 08:03

## 2017-03-06 RX ADMIN — CETIRIZINE HYDROCHLORIDE 5 MG: 5 TABLET, FILM COATED ORAL at 08:03

## 2017-03-06 NOTE — ED TRIAGE NOTES
Pt presents to ED with daughter with c/o generalized weakness that started this evening. Daughter at bedside reports Hx of kidney disease, states pt recently hospitalized for elevated Calcium causing her to become weak. Daughter reports increase in Sensipar dose to 120 mg for the past 2 days per patient's PCP. Daughter reports Hx of dementia, states pt at baseline neuro status.

## 2017-03-06 NOTE — PLAN OF CARE
Problem: Physical Therapy Goal  Goal: Physical Therapy Goal  Goals to be met by: 17     Patient will increase functional independence with mobility by performin. Supine to sit with Contact Guard Assistance  2. Sit to supine with Contact Guard Assistance  3. Sit to stand transfer with Contact Guard Assistance  4. Gait x 50 feet with Minimal Assistance using Rolling Walker.   Outcome: Ongoing (interventions implemented as appropriate)  Pt eval.  Pt with severe R lean in sitting EOB and even in bed requiring pillows to position.  Able to stand and take a few side steps.  Pt is not oriented but able to follow some commands.    REC:  SNF

## 2017-03-06 NOTE — PT/OT/SLP EVAL
"Occupational Therapy  Evaluation & Treatment    Mary Singleton   MRN: [id number]   Admitting Diagnosis: Hypercalcemia    OT Date of Treatment: 03/06/17   OT Start Time: 0902  OT Stop Time: 0946  OT Total Time (min): 44 min    Billable Minutes:  Evaluation 12  Self Care/Home Management 22  Therapeutic Activity 10    Diagnosis: Hypercalcemia       Past Medical History:   Diagnosis Date    CKD (chronic kidney disease), stage IV     Dementia 11/2/2016    2012: Diagnosed with Alzheimer's Dementia.    HPTH (hyperparathyroidism)     Hypertension     Osteoporosis       Past Surgical History:   Procedure Laterality Date    parathyroid removal         Referring physician: GERALD Orr   Date referred to OT: March 6, 2017    General Precautions: Standard, fall  Orthopedic Precautions: N/A  Braces: N/A    Do you have any cultural, spiritual, Christian conflicts, given your current situation?: None specified     Patient History:  Living Environment  Lives With: alone  Living Arrangements: house  Living Environment Comment:  (Per daughter's report, pt lives alone in no ISIAH, daughter lives 2 houses down with 3 ISIAH. Pt uses rollator. )  Equipment Currently Used at Home: 3-in-1 commode, rollator, grab bar, raised toilet    Prior level of function:    Pt required Total A for bathing and Min A for feeding and general decline after hospital admission last month. Pt's daughter provided an unclear depiction of prior level of function.     Driving License: No  Mode of Transportation: Family     Dominant hand: right    Subjective:  Communicated with nursing prior to session.  Pt said "ow" multiple times but was unable to communicate pain. Pt reported having to go to the bathroom and she wouldn't make it to the bathroom.   Chief Complaint: decreased independence  Patient/Family stated goals: improving independence and functional mobility    Pain Rating:  (Pt unable to report pain, said "ow" with movement during the session)         " "          Objective:  Patient found with: telemetry, peripheral IV    Cognitive Exam:  Oriented to: Not oriented  Follows Commands/attention: Inattentive  Communication: clear/fluent but often unresponsive to questions/cues  Memory:  Impaired STM, Impaired LTM and Poor immediate recall  Safety awareness/insight to disability: intact  Coping skills/emotional control: Despondent    Visual/perceptual:  Pt has macular degeneration, pt had difficulty locating utensils on her tray    Physical Exam:  Postural examination/scapula alignment: Rounded shoulder and Head forward  Skin integrity: Visible skin intact  Edema: None noted     Sensation:   Intact    Upper Extremity Range of Motion:  Right Upper Extremity: NT  Left Upper Extremity: NT     Upper Extremity Strength:  Right Upper Extremity: NT  Left Upper Extremity: NT   Strength: Poor    Fine motor coordination:   Impaired: unable to open silverware    Gross motor coordination: WFL    Functional Mobility:  Bed Mobility:  Rolling/Turning to Left: Moderate assistance, With side rail  Rolling/Turning Right: Moderate assistance, With side rail  Scooting/Bridging: Total Assistance, With assist of 2  Supine to Sit: Moderate Assistance  Sit to Supine: Minimum Assistance    Transfers:   None    Functional Ambulation: None    Activities of Daily Living:  Feeding Level of Assistance: Moderate assistance (cutting food when needed, [place] for bringing cup to mouth, verbal cueing for swallowing)  LE Dressing Level of Assistance: Stand by assistance (don/doff socks, verbal and tactile cues)  Toileting Where Assessed: Bed level (urinated in bed pan, pt reported "she wouldn't make it to the potty")  Toileting Level of Assistance: Total assistance       Balance:   Static Sit: POOR: Needs MODERATE assist to maintain  Dynamic Sit: FAIR+: Maintains balance through MINIMAL excursions of active trunk motion  Pt presented with significant right lateral lean when seated and rest on her elbow. " "Even in bed, she favored the right side.     Therapeutic Activities and Exercises:  Sitting balance and tolerance, orientation to midline, bed mobility    AM-PAC 6 CLICK ADL  How much help from another person does this patient currently need?  1 = Unable, Total/Dependent Assistance  2 = A lot, Maximum/Moderate Assistance  3 = A little, Minimum/Contact Guard/Supervision  4 = None, Modified [place]/Independent    Putting on and taking off regular lower body clothing? : 2  Bathing (including washing, rinsing, drying)?: 1  Toileting, which includes using toilet, bedpan, or urinal? : 2  Putting on and taking off regular upper body clothing?: 2  Taking care of personal grooming such as brushing teeth?: 2  Eating meals?: 3  Total Score: 12    AM-PAC Raw Score CMS "G-Code Modifier Level of Impairment Assistance   6 % Total / Unable   7 - 9 CM 80 - 100% Maximal Assist   10 - 14 CL 60 - 80% Moderate Assist   15 - 19 CK 40 - 60% Moderate Assist   20 - 22 CJ 20 - 40% Minimal Assist   23 CI 1-20% SBA / CGA   24 CH 0% Independent/ Mod I       Patient left supine with all lines intact, call button in reach and daughter and phlebotomist present    Assessment:  Mary Singleton is a 89 y.o. female with a medical diagnosis of Hypercalcemia and presents with dementia and macular degeneration. Pt found supine in bed with daughter present. OT evaluation completed and treatment initiated. Daughter repeatedly jumped in during orientation questions and cues. Pt's daughter frequently interrupted the session. Daughter gave an incomplete history and PLOF for the patient. Pt required Moderate Assistance for bilateral rolling and sitting EOB. At EOB, pt required verbal cues and fluctuating between SBA and Mod A to maintain sitting balance. Pt required Mod A for feeding at EOB. Pt needed verbal cueing for swallowing. Mod A for returning to supine in bed.     Rehab identified problem list/impairments: Rehab identified problem " list/impairments: weakness, impaired functional mobilty, impaired balance, decreased upper extremity function, decreased safety awareness, impaired self care skills, decreased coordination, decreased lower extremity function    Rehab potential is good.    Activity tolerance: Fair    Discharge recommendations: Discharge Facility/Level Of Care Needs:  (Family requesting SNF, if returned home, recommend pt to have 24/7 caregiver assistance)     Barriers to discharge: Barriers to Discharge: Inaccessible home environment, Decreased caregiver support    Equipment recommendations: 3-in-1 commode, walker, rolling, shower chair     GOALS:   Occupational Therapy Goals        Problem: Occupational Therapy Goal    Goal Priority Disciplines Outcome Interventions   Occupational Therapy Goal     OT, PT/OT Ongoing (interventions implemented as appropriate)    Description:  Goals to be met by 4/10/2017    1. Feeding with Minimum Assistance.  2. Sitting balance with SBA with verbal prompts for 15 minutes.   3. Assess transfer to bedside commode.   4. Assess UBD.               PLAN:  Patient to be seen 6 x/week to address the above listed problems via self-care/home management, therapeutic activities, therapeutic exercises  Plan of Care expires: 04/10/17  Plan of Care reviewed with: patient, daughter         Paloma Hernandez, GLADIS  03/06/2017

## 2017-03-06 NOTE — PLAN OF CARE
Problem: Patient Care Overview  Goal: Plan of Care Review  Outcome: Ongoing (interventions implemented as appropriate)  Pt free from falls, injury, and skin breakdown this shift. VS stable on RA, up with assistance to bedside commode. Incontinent at times, care provided. Pt very [place], hearing aids not in place, and poor vision. Communicated with family at bedside. Nephrology consulted today and has seen pt. All care explained to family and questions answered. Bed locked and low, call light within reach, bed alarm on, purposeful hourly rounding completed. Will continue to monitor.

## 2017-03-06 NOTE — ED NOTES
Assisted pt on to bedpan. Approx 200 mls clear yellow urine noted. Assisted back to position of comfort. No other needs at this time. Daughter remains at bedside. Will continue to monitor.

## 2017-03-06 NOTE — PLAN OF CARE
Problem: Occupational Therapy Goal  Goal: Occupational Therapy Goal  Goals to be met by 4/10/2017    1. Feeding with Minimum Assistance.  2. Sitting balance with SBA with verbal prompts for 15 minutes.   3. Assess transfer to bedside commode.   4. Assess UBD.   Outcome: Ongoing (interventions implemented as appropriate)  OT evaluation completed and treatment initiated. Pt's family requesting SNF. If returning home, pt would require 24/7 caregiver assistance.      GLADIS Woods  March 6, 2017

## 2017-03-06 NOTE — PT/OT/SLP EVAL
"Physical Therapy  Evaluation/treatment  G codes    Mary Singleton   MRN: [id number]   Admitting Diagnosis: Hypercalcemia    PT Received On: 03/06/17  PT Start Time: 1401     PT Stop Time: 1445    PT Total Time (min): 44 min       Billable Minutes:  Evaluation 15 and Therapeutic Activity 29    Diagnosis: Hypercalcemia      Past Medical History:   Diagnosis Date    CKD (chronic kidney disease), stage IV     Dementia 11/2/2016    2012: Diagnosed with Alzheimer's Dementia.    HPTH (hyperparathyroidism)     Hypertension     Osteoporosis       Past Surgical History:   Procedure Laterality Date    parathyroid removal         Referring physician: Dr. Orr  Date referred to PT: 3/6/17    General Precautions: Standard, fall  Orthopedic Precautions: N/A   Braces:         Do you have any cultural, spiritual, Restoration conflicts, given your current situation?: none specified    Patient History:  Lives With: alone  Living Arrangements: house  Stair Railings at Home: none  Transportation Available: family or friend will provide  Living Environment Comment: pt lives alone in 1 story house with no ISIAH.  pt's daughter lives 2 houses down from pt.  pt amb with rollator mod I per dtr although reports has been starting to lean to R recently.  daughter has been helping pt bath this past month-prior to that stated pt was I .  Dtr does all household activites.  pt walks down to her house and spends the day and walks back to her house in evening.  grandson has been sleeping there.   Equipment Currently Used at Home: 3-in-1 commode, rollator, grab bar, raised toilet  DME owned (not currently used): rolling walker    Previous Level of Function:   see above      Subjective:  Communicated with nursing, OT prior to session.    Chief Complaint: non stated   Patient goals: none stated, per family wants her to go to SNF     Pain Rating:  (unable to rate- hurts " a lot" in standing)   Location - Side: Right     Location: leg  Pain " Addressed: Reposition, Distraction, Cessation of Activity  Pain Rating Post-Intervention: 0/10    Objective:   Patient found with: telemetry, peripheral IV     Cognitive Exam:  Oriented to: Person-only able to give first name and month of birth    Follows Commands/attention: Inattentive and inconsistently following one step command  Communication: minimal verbalization   Safety awareness/insight to disability: impaired    Physical Exam:  Postural examination/scapula alignment: Rounded shoulder, Head forward, Posterior pelvic tilt, Kyphosis and leans to R    Skin integrity: Visible skin intact  Edema: None noted     Sensation:   Unable to assess    Upper Extremity- see OT eval    Lower Extremity Range of Motion:  Right Lower Extremity: WFL  Left Lower Extremity: WFL    Lower Extremity Strength:  Right Lower Extremity: Deficits: unable to MMT but moves thru near full range (3-)  Left Lower Extremity: Deficits: unable to MMT but moves against gravity thru full range but will not take any resistance (3)    Gross motor coordination: impaired    Functional Mobility:  Bed Mobility:  Supine to Sit:  (propped up at EOB with pillow and elevated HOB.  pt leans to R)  Sit to Supine: Moderate Assistance    Transfers:  Sit <> Stand Assistance: Moderate Assistance  Sit <> Stand Assistive Device: Rolling Walker    Gait:    lat side steps only     Stairs:  Not assessed    Balance:   Static Sit: POOR: Needs MODERATE assist to maintain  Dynamic Sit: POOR: N/A  Static Stand: POOR: Needs MODERATE assist to maintain  Dynamic stand: POOR: N/A    Therapeutic Activities and Exercises:  Worked on sitting balance at EOB.  Stood x 2 with RW and took 2 lat steps with seated rest.  Returned to supine with pillow to promote midline positioning.  Pt leans to R with head and neck rot and flexed to R.      AM-PAC 6 CLICK MOBILITY  How much help from another person does this patient currently need?   1 = Unable, Total/Dependent Assistance  2 = A lot,  Maximum/Moderate Assistance  3 = A little, Minimum/Contact Guard/Supervision  4 = None, Modified [place]/Independent    Turning over in bed (including adjusting bedclothes, sheets and blankets)?: 2  Sitting down on and standing up from a chair with arms (e.g., wheelchair, bedside commode, etc.): 2  Moving from lying on back to sitting on the side of the bed?: 2  Moving to and from a bed to a chair (including a wheelchair)?: 2  Need to walk in hospital room?: 2  Climbing 3-5 steps with a railing?: 1  Total Score: 11     AM-PAC Raw Score CMS G-Code Modifier Level of Impairment Assistance   6 % Total / Unable   7 - 9 CM 80 - 100% Maximal Assist   10 - 14 CL 60 - 80% Moderate Assist   15 - 19 CK 40 - 60% Moderate Assist   20 - 22 CJ 20 - 40% Minimal Assist   23 CI 1-20% SBA / CGA   24 CH 0% Independent/ Mod I     Patient left supine with all lines intact, call button in reach, nurse notified and family present.    Assessment:   Mary Singleton is a 89 y.o. female with a medical diagnosis of Hypercalcemia and presents with rehab identified problem list/impairments: Rehab identified problem list/impairments: weakness, impaired endurance, impaired self care skills, impaired functional mobilty, impaired cognition, impaired balance, gait instability, decreased lower extremity function, decreased safety awareness, pain, impaired fine motor. Pt presents with R lat lean in sitting.  Needs therapy to address impairments and maximize functional mobility to decrease burden of care.  Pt will need 24 hr S if she returns to [facility] for safety.      Rehab potential is good.    Activity tolerance: Fair    Discharge recommendations: Discharge Facility/Level Of Care Needs:  (family requesting SNF-had hospital admit within last 30 days so may clarify)     Barriers to discharge: Barriers to Discharge: Decreased caregiver support    Equipment recommendations:   TBD      GOALS:   Physical Therapy Goals        Problem: Physical  Therapy Goal    Goal Priority Disciplines Outcome Goal Variances Interventions   Physical Therapy Goal     PT/OT, PT Ongoing (interventions implemented as appropriate)     Description:  Goals to be met by: 17     Patient will increase functional independence with mobility by performin. Supine to sit with Contact Guard Assistance  2. Sit to supine with Contact Guard Assistance  3. Sit to stand transfer with Contact Guard Assistance  4. Gait  x 50 feet with Minimal Assistance using Rolling Walker.                 PLAN:    Patient to be seen 6 x/week to address the above listed problems via gait training, therapeutic activities, therapeutic exercises, neuromuscular re-education  Plan of Care expires: 17  Plan of Care reviewed with: patient, daughter, son    Functional Assessment Tool Used: AMPAC  Score: 11  Functional Limitation: Mobility: Walking and moving around  Mobility: Walking and Moving Around Current Status (): CL  Mobility: Walking and Moving Around Goal Status (): AUSTIN Villanueva, PT  2017

## 2017-03-06 NOTE — H&P
History & Physical  Hospital Medicine      SUBJECTIVE:     Chief Complaint/Reason for Admission: weakness    History of Present Illness:  Ms. Mary Singleton is a 89 y.o. with CKD IV (right arm fistula)  , Alzheimer's Dementia, HTN , Osteo perosis , CAD , PVD (L CEA 2002)  , hypothyroidism , parathyroidectomy, here with weakness. She presenters today with her daughter , who states she has noted weakness starting one day prior . The patient lives alone, several houses down from her daughter who checks on her regularly. This evening while at home the patient stated she was weak and had difficulty ambulating, something she can typically do. She denies other complaints, no chest pain or shortness of breath, leg swelling or orthopnea. She denies dysuria. Her daughter brought her to the ER for evaluation ,primarly concerned regarding hypercalcemia.         ED course : Lasix 40mg, 0.9% NaCL   ED vitals:   Initial Vitals   BP Pulse Resp Temp SpO2   03/05/17 2305 03/05/17 2305 03/05/17 2320 03/05/17 2305 03/05/17 2305   233/93 56 16 98.6 °F (37 °C) 97              (Not in a hospital admission)     Review of patient's allergies indicates:   Allergen Reactions    Penicillins      Doesn't remember it has been years       Past Medical History:   Diagnosis Date    CKD (chronic kidney disease), stage IV     Dementia 11/2/2016    2012: Diagnosed with Alzheimer's Dementia.    Hypertension     Osteoporosis        Past Surgical History:   Procedure Laterality Date    parathyroid removal         History reviewed. No pertinent family history.     Social History     Social History    Marital status:      Spouse name: N/A    Number of children: N/A    Years of education: N/A     Social History Main Topics    Smoking status: Never Smoker    Smokeless tobacco: Never Used    Alcohol use No    Drug use: No    Sexual activity: No     Other Topics Concern    None     Social History Narrative       Review of  Systems:  Constitutional: generalized weakness  Eyes: No visual changes or photophobia  HEENT: No nasal congestion or sore throat  Respiratory: No cough or shorness of breath  Cardiovascular: No chest pain or palpitations  Gastrointestinal: No nausea or vomiting, no diarrhea or change in bowel habits  Genitourinary: No hematuria or dysuria  Skin: No rash or pruritis  Hematologic/lymphatic: lower extremity bruising       OBJECTIVE:     Temp:  [98.6 °F (37 °C)] 98.6 °F (37 °C)  Pulse:  [54-62] 54  Resp:  [13-16] 13  SpO2:  [95 %-98 %] 96 %  BP: (164-233)/(67-93) 199/80  Body mass index is 23.17 kg/(m^2).     Physical Exam:  General appearance: awake and alert,   HEENT:  Normocephalic, atruamatic, conjunctivae normal, sclarea anictric, PERRL, Mucus membranes moist,  Trachea midline , no JVD   Lungs: Clear to auscultation bilaterally and normal respiratory effort  Heart: Regular rate and rhythm, S1, S2 normal, no murmur, click, rub or gallop  Abdomen: Soft, non-tender, non-distended; bowel sounds normal; no masses, no organomegaly  Extremities: No cyanosis or clubbing. No edema  Pulses: 2+ and symmetric  Skin: lower extremity ecchymosis       Laboratory: Reviewed and noted  where applicable- Please see chart for full lab data.  Hbg 11.0   Creat 3.1 ( previously 3.1 )   Ca 11.9   Ed[place]ige : 1.49     Diagnostic Tests:  EKG:     CXR:     CT    ASSESSMENT/PLAN:     Assessment Ms. Mary Singleton is a 89 y.o. with CKD IV (right arm fistula)  , Alzheimer's Dementia, HTN , Osteo perosis , CAD , PVD (L CEA 2002)  , hypothyroidism , parathyroidectomy, here with weakness likely secondary to hypercalcemia       Plan     1. Hypercalcemia   Per Dr. Russell , hold calcitriol, Lasix 40mg Q12H , IVF @ 150cc/hr   Nephrology consult   CMP in AM     2. CKD IV   Creatinine stable     3. HTN   Resume home medication   Lasix     4. Dementia   Donepezil     5. Hypothyroidism   TSH   Synthroid     6. DVT prophy   subQ heparin                Code Status : full   Dispo:obs    Virgilio Martinez D.O  [facility] Medicine

## 2017-03-06 NOTE — ED PROVIDER NOTES
Encounter Date: 3/5/2017    SCRIBE #1 NOTE: I, Carmel Serrano, am scribing for, and in the presence of, Dr. Pritchett.       History     Chief Complaint   Patient presents with    Weakness     pt family reports pt at normal mental status but having progressive weakness and has kidney disease.      Review of patient's allergies indicates:   Allergen Reactions    Penicillins      Doesn't remember it has been years     HPI Comments: Time seen by provider: 11:43 PM    This is a 89 y.o. female who presents with complaint of generalized weakness that has persisted for the past 2 days and worsened tonight.  When prompted, the patient admits that it feels as if her limbs are weaker than normal.  She endorses no CP, SOB, abdominal pain, N/V/D, or dizziness.  She reports no identifying, alleviating, or exacerbating factors.  Her daughter mentions that the patient was admitted to this hospital 1 month ago for Influenza A.  During that time, she claims like the patient's kidney's function declined.  The patient was seen by Dr. Russell, nephrology, 3 days ago and was advised to increase her dosage of parathyroid medication due to increased calcium levels.  Her daughter reports that she was concerned that her mother's current complaint was secondary to elevated calcium levels.  She states that the patient is usually able to ambulate with the use of a walker and lives alone.  The patient reports history of HTN, osteoporosis, AD, and Stage IV CKD.  She reports surgical history of a parathyroidectomy.      The history is provided by the patient and a relative (daughter).     Past Medical History:   Diagnosis Date    CKD (chronic kidney disease), stage IV     Dementia 11/2/2016    2012: Diagnosed with Alzheimer's Dementia.    Hypertension     Osteoporosis      Past Surgical History:   Procedure Laterality Date    parathyroid removal       History reviewed. No pertinent family history.  Social History   Substance Use Topics     Smoking status: Never Smoker    Smokeless tobacco: Never Used    Alcohol use No     Review of Systems   Constitutional: Negative for chills and fever.   HENT: Negative for facial swelling.    Respiratory: Negative for shortness of breath.    Cardiovascular: Negative for chest pain.   Gastrointestinal: Negative for abdominal pain, diarrhea, nausea and vomiting.   Endocrine: Negative for polyuria.   Genitourinary: Negative for dysuria.   Musculoskeletal: Negative for myalgias.   Skin: Negative for rash.   Neurological: Positive for weakness. Negative for dizziness and headaches.       Physical Exam   Initial Vitals   BP Pulse Resp Temp SpO2   03/05/17 2305 03/05/17 2305 03/05/17 2320 03/05/17 2305 03/05/17 2305   233/93 56 16 98.6 °F (37 °C) 97 %     Physical Exam    Nursing note and vitals reviewed.  Constitutional: She appears well-developed and well-nourished. She is not diaphoretic. No distress.   HENT:   Head: Normocephalic and atraumatic.   Right Ear: External ear normal.   Left Ear: External ear normal.   Eyes: EOM are normal. Right eye exhibits no discharge. Left eye exhibits no discharge.   Neck: Normal range of motion.   Cardiovascular: Normal rate, regular rhythm and normal heart sounds. Exam reveals no gallop and no friction rub.    No murmur heard.  Pulmonary/Chest: Breath sounds normal. No respiratory distress. She has no wheezes. She has no rhonchi. She has no rales.   Abdominal: Soft. Bowel sounds are normal. There is no tenderness. There is no rebound and no guarding.   Musculoskeletal: She exhibits no edema or tenderness.        Left shoulder: She exhibits decreased range of motion.   Decreased active ROM of the left shoulder, which is chronic.   Neurological: She is alert and oriented to person, place, and time.   4/5 strength to bilateral upper and lower extremities.  Alert and appropriate.   Skin: Skin is warm and dry. No rash and no abscess noted. No erythema. No pallor.   Psychiatric: She has  a normal mood and affect. Her behavior is normal. Judgment and thought content normal.         ED Course   Procedures  Labs Reviewed   CBC W/ AUTO DIFFERENTIAL - Abnormal; Notable for the following:        Result Value    RBC 3.50 (*)     Hemoglobin 11.0 (*)     Hematocrit 33.9 (*)     MCH 31.4 (*)     Lymph% 15.7 (*)     All other components within normal limits   URINALYSIS - Abnormal; Notable for the following:     Protein, UA 1+ (*)     All other components within normal limits   BASIC METABOLIC PANEL - Abnormal; Notable for the following:     BUN, Bld 82 (*)     Creatinine 3.1 (*)     Calcium 11.9 (*)     eGFR if  15 (*)     eGFR if non  13 (*)     All other components within normal limits   CALCIUM, IONIZED - Abnormal; Notable for the following:     Calcium, Ion 1.49 (*)     All other components within normal limits   MAGNESIUM   URINALYSIS MICROSCOPIC      Imaging Results     None                  Medical Decision Making:   History:   I obtained history from: someone other than patient.       <> Summary of History: Daughter reports recent elevated CA in clinic and feels that this is responsible for the recent increase in lower extremity weakness which worsened tonight.  Per the daughter, the patient lives alone and is able to walk with a walker, however, tonight she was having difficulty walking.    Old Medical Records: I decided to obtain old medical records.  Initial Assessment:   88 y/o female BIB her daughter 2/2 concern for hypercalcemia as the cause of her increased weakness.  The patient denied any complaints on my assessment, however, when I specifically asked if her legs and arms felt weak she said yes.  On exam she had 4/5 BUE and BLE weakness.     Clinical Tests:   Lab Tests: Ordered and Reviewed  ED Management:  Labs and UA obtained and significant for a Ca of 11.9 and an ionized Ca of 1.49.  I did discuss the case with Dr. Russell and she recommended admission  for hydration, lasix BID given CKD and holding the calcitrol.  The plan was discussed with the daughter who agreed.  The case was also d/w the moonlighter and the patient will be admitted to the hospitalist service in stable condition.   Other:   I have discussed this case with another health care provider.       <> Summary of the Discussion: 1:25 AM.  I have consulted and discussed with Dr. Russell, nephrology, who recommends admission for hydration at 150 per hour, holding the calcitrol and lasix 40 q. 12.             Scribe Attestation:   Scribe #1: I performed the above scribed service and the documentation accurately describes the services I performed. I attest to the accuracy of the note.    Attending Attestation:           Physician Attestation for Scribe:  Physician Attestation Statement for Scribe #1: I, Dr. Pritchett, reviewed documentation, as scribed by Carmel Serrano in my presence, and it is both accurate and complete.                 ED Course     Clinical Impression:     1. Hypercalcemia    2. Generalized weakness                Louisa Pritchett MD  03/06/17 8749

## 2017-03-06 NOTE — CONSULTS
Consult Note  Nephrology    Consult Requested By: Juanpablo Orr MD  Reason for Consult:  hypercalcemia    SUBJECTIVE:     History of Present Illness:  Patient is a 89 y.o. female presents with c/o weakness and found have hypercalcemia (11.9, Ionized 1.49).  Pt followed by Dr. Arvizu/Yvonne as outpt with CKD IVB and 1HPTH.  Has had 1 parathyroid removed in past but has since been on Sensipar/VitD/Calcitriol.  Last week sensipar dose was increased to 120mg.  Pt seen and examined and still weak and requiring assistance to eat/move in bed.  Discussed with daughter at bedside.  She's inquiring about SNF.      Denies CP/SOB/N/V/D/F/C.     Past Medical History:   Diagnosis Date    CKD (chronic kidney disease), stage IV     Dementia 11/2/2016    2012: Diagnosed with Alzheimer's Dementia.    HPTH (hyperparathyroidism)     Hypertension     Osteoporosis      Past Surgical History:   Procedure Laterality Date    parathyroid removal       Family History   Problem Relation Age of Onset    Hypertension Father     Cancer Sister     Hearing loss Sister      Social History   Substance Use Topics    Smoking status: Never Smoker    Smokeless tobacco: Never Used    Alcohol use No       Review of patient's allergies indicates:   Allergen Reactions    Penicillins      Doesn't remember it has been years        Review of Systems:    See HPI    OBJECTIVE:     Vital Signs (Most Recent)  Temp: 98.6 °F (37 °C) (03/06/17 0715)  Pulse: 87 (03/06/17 0715)  Resp: 18 (03/06/17 0715)  BP: (!) 190/85 (03/06/17 0715)  SpO2: 95 % (03/06/17 0715)    Vital Signs Range (Last 24H):  Temp:  [97.6 °F (36.4 °C)-98.6 °F (37 °C)]   Pulse:  [54-87]   Resp:  [13-19]   BP: (164-233)/(67-93)   SpO2:  [95 %-98 %]     Physical Exam:  General appearance: elderly frail/weak  Eyes:  Conjunctivae/corneas clear. PERRL.  Lungs: Normal respiratory effort,   clear to auscultation bilaterally   Heart: Regular rate and rhythm, S1, S2 normal, no murmur, rub or  julio c.  Abdomen: Soft, non-tender non-distended; bowel sounds normal; no masses,  no organomegaly  Extremities: No cyanosis or clubbing. No edema.    Skin: Skin color, texture, turgor normal. No rashes or lesions  Neurologic: No focal weakness but generalized weakness.  Right Arm AVF      Laboratory:    Recent Labs  Lab 03/06/17  0011   WBC 7.81   RBC 3.50*   HGB 11.0*   HCT 33.9*      MCV 97   MCH 31.4*   MCHC 32.4     BMP:   Recent Labs  Lab 03/06/17  0524   GLU 84      K 4.2      CO2 25   BUN 78*   CREATININE 3.0*   CALCIUM 11.8*   MG 2.3     Lab Results   Component Value Date     (H) 04/01/2010    CALCIUM 11.8 (H) 03/06/2017    CAION 1.49 (H) 03/06/2017    PHOS 3.8 02/06/2017       Diagnostic Results:      ASSESSMENT/PLAN:     1. 1HPTH (E21.3) with body weakness:  Hx of 1 parathyroid removal but too many comorbidities for general surgery.  Sensipar increased to 120mg and will hold calcitriol/Vit D for now.  Pt also due to receive prolia next month which will also help.  Ionized calcium improving and suspect will be wnl tomorrow am.  S/P IVF's and lasix last night.  The degree of hypercalcemia unlikely the cause of her weakness, especially since son reported that it was waxing and waning.  2. CKD IVB (N18.4):  Had 1 round of HD last admission and unfortunately had AVF infiltration.  Renal fxn has stabilized and HD is on hold for now.   3. HTN (I12.9):  CCB/BB for now. No ACE/ARB.   4. Worsening dementia (F03.90): Patient has had a rapid decline in her mental status despite Aricept.  5. Dispo:  Needs SNF.        Thanks for consult  See above orders/recs.

## 2017-03-06 NOTE — ED NOTES
Pt assisted to bedpan. Approx 250 mls clear yellow urine noted. Pt tolerated well. Assisted to position of comfort. Pt remains on CM, NIBP and pulse ox. No other needs expressed at this time. Updated on POC. Awaiting admit orders and bed assignment.

## 2017-03-06 NOTE — ED NOTES
Bedside report rec'ed, care assumed. Pt resting comfortably on stretcher with CM,  NIBP, and pulse ox in place. VSS on monitor. Daughter at bedside. No needs or complaints at this time. Denies pain. Awaiting lab results.

## 2017-03-07 LAB
ANION GAP SERPL CALC-SCNC: 9 MMOL/L
BUN SERPL-MCNC: 77 MG/DL
CA-I BLDV-SCNC: 1.39 MMOL/L
CALCIUM SERPL-MCNC: 10.8 MG/DL
CHLORIDE SERPL-SCNC: 106 MMOL/L
CO2 SERPL-SCNC: 27 MMOL/L
CREAT SERPL-MCNC: 3.2 MG/DL
EST. GFR  (AFRICAN AMERICAN): 14 ML/MIN/1.73 M^2
EST. GFR  (NON AFRICAN AMERICAN): 12 ML/MIN/1.73 M^2
GLUCOSE SERPL-MCNC: 80 MG/DL
POTASSIUM SERPL-SCNC: 4.3 MMOL/L
SODIUM SERPL-SCNC: 142 MMOL/L

## 2017-03-07 PROCEDURE — 25000003 PHARM REV CODE 250: Performed by: HOSPITALIST

## 2017-03-07 PROCEDURE — 97116 GAIT TRAINING THERAPY: CPT | Mod: 59

## 2017-03-07 PROCEDURE — 97110 THERAPEUTIC EXERCISES: CPT

## 2017-03-07 PROCEDURE — 82330 ASSAY OF CALCIUM: CPT

## 2017-03-07 PROCEDURE — 25000003 PHARM REV CODE 250: Performed by: INTERNAL MEDICINE

## 2017-03-07 PROCEDURE — 80048 BASIC METABOLIC PNL TOTAL CA: CPT

## 2017-03-07 PROCEDURE — 99226 PR SUBSEQUENT OBSERVATION CARE,LEVEL III: CPT | Mod: ,,, | Performed by: HOSPITALIST

## 2017-03-07 PROCEDURE — 25000003 PHARM REV CODE 250: Performed by: NURSE PRACTITIONER

## 2017-03-07 PROCEDURE — 36415 COLL VENOUS BLD VENIPUNCTURE: CPT

## 2017-03-07 PROCEDURE — 63600175 PHARM REV CODE 636 W HCPCS: Performed by: NURSE PRACTITIONER

## 2017-03-07 PROCEDURE — 97535 SELF CARE MNGMENT TRAINING: CPT

## 2017-03-07 PROCEDURE — G0378 HOSPITAL OBSERVATION PER HR: HCPCS

## 2017-03-07 PROCEDURE — 86580 TB INTRADERMAL TEST: CPT | Performed by: NURSE PRACTITIONER

## 2017-03-07 PROCEDURE — 97530 THERAPEUTIC ACTIVITIES: CPT

## 2017-03-07 RX ORDER — HEPARIN SODIUM 5000 [USP'U]/ML
5000 INJECTION, SOLUTION INTRAVENOUS; SUBCUTANEOUS EVERY 8 HOURS
Status: DISCONTINUED | OUTPATIENT
Start: 2017-03-07 | End: 2017-03-09 | Stop reason: HOSPADM

## 2017-03-07 RX ADMIN — HEPARIN SODIUM 5000 UNITS: 5000 INJECTION, SOLUTION INTRAVENOUS; SUBCUTANEOUS at 10:03

## 2017-03-07 RX ADMIN — CINACALCET HYDROCHLORIDE 120 MG: 30 TABLET, COATED ORAL at 08:03

## 2017-03-07 RX ADMIN — LEVOTHYROXINE SODIUM 150 MCG: 150 TABLET ORAL at 06:03

## 2017-03-07 RX ADMIN — FLUOXETINE 20 MG: 20 CAPSULE ORAL at 08:03

## 2017-03-07 RX ADMIN — DONEPEZIL HYDROCHLORIDE 10 MG: 5 TABLET, FILM COATED ORAL at 08:03

## 2017-03-07 RX ADMIN — TUBERCULIN PURIFIED PROTEIN DERIVATIVE 5 UNITS: 5 INJECTION, SOLUTION INTRADERMAL at 12:03

## 2017-03-07 RX ADMIN — AMLODIPINE BESYLATE 5 MG: 5 TABLET ORAL at 08:03

## 2017-03-07 RX ADMIN — ASPIRIN 81 MG: 81 TABLET, COATED ORAL at 08:03

## 2017-03-07 RX ADMIN — CETIRIZINE HYDROCHLORIDE 5 MG: 5 TABLET, FILM COATED ORAL at 08:03

## 2017-03-07 RX ADMIN — QUETIAPINE FUMARATE 25 MG: 25 TABLET, FILM COATED ORAL at 10:03

## 2017-03-07 NOTE — PROGRESS NOTES
"Nephrology  Progress Note    Admit Date: 3/5/2017   LOS: 0 days     SUBJECTIVE:     Follow-up For:  Hypercalcemia    Interval History:    Looks/feels better this am.  Walking hallway with PT.  Discussed with family at bedside.  Awaiting SNF placement.  Aniket wnl.  Denies CP/SOB.     Review of Systems:  Constitutional: No fever or chills  Respiratory: No cough or shortness of breath  Cardiovascular: No chest pain or palpitations  Gastrointestinal: No nausea or vomiting  Neurological: No confusion or weakness    OBJECTIVE:     Vital Signs Range (Last 24H):  BP (!) 150/67  Pulse 94  Temp 98.9 °F (37.2 °C) (Oral)   Resp 18  Ht 5' 4" (1.626 m)  Wt 58.3 kg (128 lb 8.5 oz)  SpO2 (!) 94%  Breastfeeding? No  BMI 22.06 kg/m2    Temp:  [97.3 °F (36.3 °C)-98.9 °F (37.2 °C)]   Pulse:  [51-94]   Resp:  [14-18]   BP: (139-189)/(66-73)   SpO2:  [94 %-97 %]     I & O (Last 24H):  Intake/Output Summary (Last 24 hours) at 03/07/17 1139  Last data filed at 03/07/17 0900   Gross per 24 hour   Intake              660 ml   Output              600 ml   Net               60 ml       Physical Exam:  General appearance: elderly frail/weak.  [place]  Eyes: Conjunctivae/corneas clear. PERRL.  Lungs: Normal respiratory effort, clear to auscultation bilaterally   Heart: Regular rate and rhythm, S1, S2 normal, no murmur, rub or julio c.  Abdomen: Soft, non-tender non-distended; bowel sounds normal; no masses, no organomegaly  Extremities: No cyanosis or clubbing. No edema.   Skin: Skin color, texture, turgor normal. No rashes or lesions  Neurologic: No focal weakness but generalized weakness.  Right Arm AVF    Laboratory Data:  No results for input(s): WBC, RBC, HGB, HCT, PLT, MCV, MCH, MCHC in the last 24 hours.    BMP:   Recent Labs  Lab 03/06/17  0524 03/07/17  0420   GLU 84 80    142   K 4.2 4.3    106   CO2 25 27   BUN 78* 77*   CREATININE 3.0* 3.2*   CALCIUM 11.8* 10.8*   MG 2.3  --      Lab Results   Component Value Date    PTH " 245.2 (H) 03/06/2017    CALCIUM 10.8 (H) 03/07/2017    CAION 1.39 03/07/2017    PHOS 3.8 02/06/2017             Medications:  Medication list was reviewed and changes noted under Assessment/Plan.    Diagnostic Results:        ASSESSMENT/PLAN:     1. 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 Sensipar increased to 120mg and holding calcitriol/Vit D for now. Pt also due to receive prolia next month which will also help. Ionized calcium wnl this am. The degree of hypercalcemia unlikely the cause of her weakness, especially since son reported that it was waxing and waning.  2. 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 Renal fxn has stabilized and HD is on hold for now. Follow trends.   3. HTN (I12.9): CCB/BB for now. No ACE/ARB.   4. Worsening dementia (F03.90): Patient has had a rapid decline in her mental status despite Aricept.  5. Dispo: Needs SNF due to weakness and pt living alone.

## 2017-03-07 NOTE — PLAN OF CARE
[facility]    HOME HEALTH ORDERS  FACE TO FACE ENCOUNTER    Patient Name: Mary Singleton  YOB: 1927    PCP: Susana Arvizu MD   PCP Address: [address] [place] [address]  PCP Phone Number: [phone]  PCP Fax: [phone]    Encounter Date: 03/07/2017    Admit to Home Health    Diagnoses:  Active Hospital Problems    Diagnosis  POA    *Hypercalcemia [E83.52]  Yes    Generalized weakness [R53.1]  Yes    Dementia [F03.90]  Yes     2012: Diagnosed with Alzheimer's Dementia.      Chronic kidney disease, stage IV (severe) [N18.4]  Yes     11/15/2012: BUN/crea 38/2.1. CrCl 22.  Right arm fistula.      Hyperparathyroidism [E21.3]  Yes     2010: Diagnosed.      Hypertension [I10]  Yes     2000: Diagnosed.        Resolved Hospital Problems    Diagnosis Date Resolved POA   No resolved problems to display.       Future Appointments  Date Time Provider Department Center   5/3/2017 10:30 AM MD KAROL Figueroa           I have seen and examined this patient face to face today. My clinical findings that support the need for the home health skilled services and home bound status are the following:  {SELECT AT LEAST ONE HOME HEALTH QUALIFYING STATUS:47166}    Allergies:  Review of patient's allergies indicates:   Allergen Reactions    Penicillins      Doesn't remember it has been years       Diet: renal diet    Activities: activity as tolerated    Nursing:   SN to complete comprehensive assessment including routine vital signs. Instruct on disease process and s/s of complications to report to MD. Review/verify medication list sent home with the patient at time of discharge  and instruct patient/caregiver as needed. Frequency may be adjusted depending on start of care date.    Notify MD if SBP > 160 or < 90; DBP > 90 or < 50; HR > 120 or < 50; Temp > 101; Other:        CONSULTS:    Physical Therapy to evaluate and treat. Evaluate for home safety and  equipment needs; Establish/upgrade home exercise program. Perform / instruct on therapeutic exercises, gait training, transfer training, and Range of Motion.  Occupational Therapy to evaluate and treat. Evaluate home environment for safety and equipment needs. Perform/Instruct on transfers, ADL training, ROM, and therapeutic exercises.  Aide to provide assistance with personal care, ADLs, and vital signs.    MISCELLANEOUS CARE:  { MISC:86402}    WOUND CARE ORDERS  n/a      Medications: Review discharge medications with patient and family and provide education.      Current Discharge Medication List      CONTINUE these medications which have NOT CHANGED    Details   amlodipine (NORVASC) 2.5 MG tablet Take 2.5 mg by mouth once daily.      aspirin (ECOTRIN) 81 MG EC tablet Take 1 tablet (81 mg total) by mouth once daily.  Qty: 90 tablet, Refills: 3    Associated Diagnoses: Bilateral carotid artery stenosis      calcitRIOL (ROCALTROL) 0.25 MCG Cap Take 0.25 mcg by mouth once daily.       cetirizine (ZYRTEC) 5 MG tablet Take 5 mg by mouth once daily.      cholecalciferol, vitamin D3, (VITAMIN D3) 2,000 unit Cap Take 1 capsule by mouth once daily.      donepezil (ARICEPT) 10 MG tablet Take 10 mg by mouth once daily.       fluoxetine (PROZAC) 10 MG capsule Take 20 mg by mouth once daily.       levothyroxine (SYNTHROID) 150 MCG tablet Take 150 mcg by mouth once daily.      quetiapine (SEROQUEL) 25 MG Tab Take 25 mg by mouth once daily.      acetaminophen (TYLENOL) 500 mg Cap       DENOSUMAB (PROLIA SUBQ) Inject into the skin.             I certify that this patient is confined to her home and needs { Certification Criteria:09475}.

## 2017-03-07 NOTE — PLAN OF CARE
Problem: Patient Care Overview  Goal: Plan of Care Review  Outcome: Ongoing (interventions implemented as appropriate)  Pt free from falls, injury, and skin breakdown this shift. VS stable on RA, up with assistance to bedside commode. Incontinent at times, care provided. Pt very [place], hearing aids not in place, and poor vision. Communicated with family at bedside. All care explained to family and questions answered. Pt sat in chair for several hours today. Bed locked and low, call light within reach, bed alarm on, purposeful hourly rounding completed. Will continue to monitor.

## 2017-03-07 NOTE — PLAN OF CARE
Problem: Occupational Therapy Goal  Goal: Occupational Therapy Goal  Goals to be met by 4/10/2017    1. Feeding with Minimum Assistance.  2. Sitting balance with SBA with verbal prompts for 15 minutes.   3. Assess transfer to bedside commode. ---Goal met 3/7/2017  NEW GOAL: BSC stand pivot tf/ with CGA.  4. Assess UBD.----Goal met 3/7/2017  NEW GOAL: UBD Min Assist supported sitting.  Outcome: Ongoing (interventions implemented as appropriate)  Pt. met 2 goals today. Pt. making good progress.  UB dressing assessed from BS chair with max assist to thread up to L shoulder, reach around shoulders and fasten snaps, toileting Moderate Assistance for repositioning for seated and standing hygiene, min assist to stand and assisted with clothes management standing with RW. BS chair<>BSC stand pivot t/f with min assist with RW; BS chair <>bed Stand pivot t/f with RW and Minimum Assistance -posterior center of gravity; unsteady; poor safety; tries to sit on chair prematurely before standing directly in front of chair or bed; walker placement assist. BUE AAROM 3 reps shld flexion x 3 sets with isometric hold and slow reversal; sitting ex 5 min EOB with CGA-leans to R and easily fatigued.  Education with daughter. SNF recommended.  Continue with OT POC.

## 2017-03-07 NOTE — PLAN OF CARE
Problem: Patient Care Overview  Goal: Plan of Care Review  Outcome: Ongoing (interventions implemented as appropriate)  Plan of care reviewed with patient and care giver. Patient rested peacefully throughout the night. Denied any pain. Remained free from falls/injury. Bed in a lowered locked, call bell within reach. No needs at this time.

## 2017-03-07 NOTE — PROGRESS NOTES
Progress Note  Hospital Medicine    Admit Date: 3/5/2017   LOS: 0 days     SUBJECTIVE:     Follow-up For:  Hypercalcemia    Interval History:     Pt new to me.  Doing okay.  Poor historian.  Has dementia.         Review of Systems:  Constitutional: No fever or chills  Respiratory: No cough or shorness of breath  Cardiovascular: No chest pain or palpitations  Gastrointestinal: No nausea or vomiting  Neurological: No confusion or weakness    OBJECTIVE:     Vital Signs Range (Last 24H):  Vitals:    03/07/17 1115   BP: (!) 142/81   Pulse: 96   Resp: 18   Temp: 98.3 °F (36.8 °C)       Temp:  [97.3 °F (36.3 °C)-98.9 °F (37.2 °C)]   Pulse:  [51-96]   Resp:  [14-18]   BP: (139-189)/(66-81)   SpO2:  [94 %-97 %]     I & O (Last 24H):  Intake/Output Summary (Last 24 hours) at 03/07/17 1446  Last data filed at 03/07/17 1200   Gross per 24 hour   Intake              660 ml   Output              650 ml   Net               10 ml       Physical Exam:  General appearance: Calm, NAD  Eyes:  Conjunctivae/corneas clear. PERRL.  Lungs: Normal respiratory effort,   clear to auscultation bilaterally   Heart: Regular rate and rhythm, S1, S2 normal,  Abdomen: Soft, non-tender non-distended; bowel sounds normal;   Extremities: No cyanosis or clubbing. No edema.    Skin: Skin color, texture, turgor normal. No rashes or lesions  Neurologic:  No focal numbness or weakness     Laboratory Data:  Reviewed and noted  where applicable- Please see chart for full lab data.    Medications:  Medication list was reviewed and changes noted under Assessment/Plan.    Diagnostic Results:  No new      ASSESSMENT/PLAN:     Active Hospital Problems    Diagnosis  POA    *Hypercalcemia [E83.52]  Yes    Generalized weakness [R53.1]  Yes    Dementia [F03.90]  Yes     2012: Diagnosed with Alzheimer's Dementia.      Chronic kidney disease, stage IV (severe) [N18.4]  Yes     11/15/2012: BUN/crea 38/2.1. CrCl 22.  Right arm fistula.      Hyperparathyroidism  [E21.3]  Yes     2010: Diagnosed.      Hypertension [I10]  Yes     2000: Diagnosed.        Resolved Hospital Problems    Diagnosis Date Resolved POA   No resolved problems to display.       1. Hypercalcemia   - Nephrology following  - Improved after IVF  - Sensipar increased to 120mg and holding calcitriol/Vit D for now.   - Due to receive prolia next month         2. CKD IV   - Creatinine stable   - HD on hold for now     3. HTN   - Resume home medication      4. Dementia   - Donepezil      5. Hypothyroidism  - TSH = 0.643  - Synthroid      6. DVT prophy   - subQ heparin

## 2017-03-07 NOTE — PLAN OF CARE
CM received denial from [facility],stating they could not meet her needs.[facility] declined stating they felt like she would need longer than 2 weeks. [facility] declined because they have no available beds. CM spoke with family for more choices, family declined to provide additional choices.  Plan B will be home with HH.  CM to follow.

## 2017-03-07 NOTE — PT/OT/SLP PROGRESS
Occupational Therapy  Treatment    Mary Singleton   MRN: [id number]   Admitting Diagnosis: Hypercalcemia    OT Date of Treatment: 03/07/17   OT Start Time: 1155  OT Stop Time: 1221  OT Total Time (min): 26 min    Billable Minutes:  Self Care/Home Management 10, Therapeutic Activity 8, Therapeutic Exercise 8 and Total Time 26    General Precautions: Standard, fall  Orthopedic Precautions: N/A  Braces: N/A    Do you have any cultural, spiritual, Nondenominational conflicts, given your current situation?: None specified    Subjective:  Communicated with nurse prior to session.      Pain Rating:  (c/o pain B legs but not rated.  daughter says pt has chronic LE pain.)  Location - Side: Bilateral  Location - Orientation: generalized  Location: leg  Pain Addressed: Reposition, Distraction  Pain Rating Post-Intervention:  (same)    Objective:  Patient found with: telemetry     Functional Mobility:  Bed Mobility: NA       Transfers:   Sit <> Stand Assistance: Minimum Assistance (4 trials from BSC , BS chair and EOB; unsteady; mod vc for hand plaement; )  Sit <> Stand Assistive Device: Rolling Walker  Bed <> Chair Technique: Stand Pivot  Bed <> Chair Transfer Assistance: Minimum Assistance (posterior center of gravity; unsteady; poor safety; tries to sit on chair oprematurely before standing directly in front  of chair or bed; walker placement assist)  Bed <> Chair Assistive Device: Rolling Walker  Toilet Transfer Technique: Stand Pivot  Toilet Transfer Assistance: Minimum Assistance  Toilet Transfer Assistive Device: Rolling Walker, bedside commode (same as above)    Functional Ambulation: NT    Activities of Daily Living:       UE Dressing Level of Assistance: Maximum assistance (to thread LUE up to shld, reach around shoulders and fasten snaps after mutliple trials could not fasten snaps; )          Toileting Where Assessed: Bedside commode  Toileting Level of Assistance: Moderate assistance (repositioning for seated and standing  hygiene ; min asist to stand; assisted with clothes management standing with RW)         Balance:   Static Sit: FAIR-: Maintains without assist but inconsistent   Dynamic Sit: POOR: N/A  Static Stand: POOR+: Needs MINIMAL assist to maintain  Dynamic stand: POOR: N/A    Therapeutic Activities and Exercises:  UB dressing assessed from BS chair with max assist to thread up to L shoulder, reach around shoulders and fasten snaps, toileting Moderate Assistance for repositioning for seated and standing hygiene, min assist to stand and assisted with clothes management standing with RW. BS chair<>BSC stand pivot t/f with min assist with RW; BS chair <>bed Stand pivot t/f with RW and Minimum Assistance -posterior center of gravity; unsteady; poor safety; tries to sit on chair prematurely before standing directly in front of chair or bed; walker placement assist. BUE AAROM 3 reps shld flexion x 3 sets with isometric hold and slow reversal; sitting ex 5 min EOB with CGA-leans to R and easily fatigued.  Education with daughter.     AM-PAC 6 CLICK ADL   How much help from another person does this patient currently need?   1 = Unable, Total/Dependent Assistance  2 = A lot, Maximum/Moderate Assistance  3 = A little, Minimum/Contact Guard/Supervision  4 = None, Modified [place]/Independent    Putting on and taking off regular lower body clothing? : 2  Bathing (including washing, rinsing, drying)?: 2  Toileting, which includes using toilet, bedpan, or urinal? : 2  Putting on and taking off regular upper body clothing?: 2  Taking care of personal grooming such as brushing teeth?: 2  Eating meals?: 3  Total Score: 13     AM-PAC Raw Score CMS G-Code Modifier Level of Impairment Assistance   6 % Total / Unable   7 - 9 CM 80 - 100% Maximal Assist   10 - 14 CL 60 - 80% Moderate Assist   15 - 19 CK 40 - 60% Moderate Assist   20 - 22 CJ 20 - 40% Minimal Assist   23 CI 1-20% SBA / CGA   24 CH 0% Independent/ Mod I     Patient  left up in chair with all lines intact, call button in reach, nurse notified and daughter present    ASSESSMENT:  Mary Singleton is a 89 y.o. female with a medical diagnosis of Hypercalcemia and presents with 2 goals met today.  Making good progress.  Easily fatigued.  SNF recommended.  Continue with OT POC.    Rehab identified problem list/impairments: Rehab identified problem list/impairments: weakness, impaired self care skills, impaired balance, decreased coordination, decreased safety awareness, decreased ROM, impaired endurance, impaired functional mobilty, decreased upper extremity function, impaired coordination, gait instability, impaired cognition, impaired fine motor    Rehab potential is good.    Activity tolerance: Fair    Discharge recommendations: Discharge Facility/Level Of Care Needs: nursing facility, skilled     Barriers to discharge: Barriers to Discharge: Decreased caregiver support    Equipment recommendations: 3-in-1 commode, walker, rolling, shower chair     GOALS:   Occupational Therapy Goals        Problem: Occupational Therapy Goal    Goal Priority Disciplines Outcome Interventions   Occupational Therapy Goal     OT, PT/OT Ongoing (interventions implemented as appropriate)    Description:  Goals to be met by 4/10/2017    1. Feeding with Minimum Assistance.  2. Sitting balance with SBA with verbal prompts for 15 minutes.   3. Assess transfer to bedside commode. ---Goal met 3/7/2017  NEW GOAL:  BSC stand pivot tf/ with CGA.  4. Assess UBD.----Goal met 3/7/2017  NEW GOAL:  UBD Min  Assist supported sitting.              Plan:  Patient to be seen 6 x/week to address the above listed problems via self-care/home management, therapeutic activities, therapeutic exercises  Plan of Care expires: 04/10/17  Plan of Care reviewed with: patient, daughter         Diamante Luceron, OT  03/07/2017

## 2017-03-07 NOTE — PT/OT/SLP PROGRESS
Physical Therapy  Treatment    Mary Singleton   MRN: [id number]   Admitting Diagnosis: Hypercalcemia    PT Received On: 17  PT Start Time: 1033     PT Stop Time: 1100    PT Total Time (min): 27 min       Billable Minutes:  Gait [id number] and Therapeutic Exercise 10    Treatment Type: Treatment  PT/PTA: PTA     PTA Visit Number: 1       General Precautions: Standard, fall  Orthopedic Precautions: N/A   Braces:      Do you have any cultural, spiritual, Muslim conflicts, given your current situation?: none specified    Subjective:  Communicated with ns prior to session.  Pt agreeable to tx., daughter present     Pain Ratin/10              Pain Rating Post-Intervention: 0/10    Objective:   Patient found with: peripheral IV, telemetry    Functional Mobility:  Bed Mobility:        Transfers:  Sit <> Stand Assistance: Minimum Assistance  Sit <> Stand Assistive Device: Rolling Walker    Gait:   Gait Distance: 15' and 50' w/ RW and Shola  Assistance 1: Minimum assistance  Gait Assistive Device: Rolling walker  Gait Pattern: reciprocal  Gait Deviation(s): decreased bryan, decreased step length, decreased stride length, decreased toe-to-floor clearance (narrow BONNY)    Stairs:  n/a    Balance:   Static Sit: FAIR+: Able to take MINIMAL challenges from all directions  Dynamic Sit: FAIR+: Maintains balance through MINIMAL excursions of active trunk motion  Static Stand: FAIR: Maintains without assist but unable to take challenges  Dynamic stand: POOR: N/A     Therapeutic Activities and Exercises:  Pt perf'd commode t/f's using rail on wall and CGA/min.A, perf'd hygeine after urinating w/ SBA only.   Perf'd seated LE ex's of heel/toe raises, LAQ's x 10 ea.      AM-PAC 6 CLICK MOBILITY  How much help from another person does this patient currently need?   1 = Unable, Total/Dependent Assistance  2 = A lot, Maximum/Moderate Assistance  3 = A little, Minimum/Contact Guard/Supervision  4 = None, Modified  [place]/Independent    Turning over in bed (including adjusting bedclothes, sheets and blankets)?: 3  Sitting down on and standing up from a chair with arms (e.g., wheelchair, bedside commode, etc.): 3  Moving from lying on back to sitting on the side of the bed?: 3  Moving to and from a bed to a chair (including a wheelchair)?: 3  Need to walk in hospital room?: 3  Climbing 3-5 steps with a railing?: 1  Total Score: 16    AM-PAC Raw Score CMS G-Code Modifier Level of Impairment Assistance   6 % Total / Unable   7 - 9 CM 80 - 100% Maximal Assist   10 - 14 CL 60 - 80% Moderate Assist   15 - 19 CK 40 - 60% Moderate Assist   20 - 22 CJ 20 - 40% Minimal Assist   23 CI 1-20% SBA / CGA   24 CH 0% Independent/ Mod I     Patient left up in chair with all lines intact, call button in reach, ns notified and daughter present.    Assessment:  Mary Singleton is a 89 y.o. female with a medical diagnosis of Hypercalcemia and presents with inc amb dist today, though needing min.A for balance and RW mgmt..    Rehab identified problem list/impairments: Rehab identified problem list/impairments: weakness, impaired endurance, impaired functional mobilty, impaired self care skills, gait instability, impaired balance, impaired cognition, decreased safety awareness    Rehab potential is good.    Activity tolerance: Good    Discharge recommendations: Discharge Facility/Level Of Care Needs: nursing facility, skilled     Barriers to discharge: Barriers to Discharge: Decreased caregiver support    Equipment recommendations: Equipment Needed After Discharge: 3-in-1 commode, walker, rolling, shower chair     GOALS:   Physical Therapy Goals        Problem: Physical Therapy Goal    Goal Priority Disciplines Outcome Goal Variances Interventions   Physical Therapy Goal     PT/OT, PT Ongoing (interventions implemented as appropriate)     Description:  Goals to be met by: 03/13/17     Patient will increase functional independence  with mobility by performin. Supine to sit with Contact Guard Assistance  2. Sit to supine with Contact Guard Assistance  3. Sit to stand transfer with Contact Guard Assistance  4. Gait  x 50 feet with Minimal Assistance using Rolling Walker.                 PLAN:    Patient to be seen 6 x/week  to address the above listed problems via gait training, therapeutic activities, therapeutic exercises, neuromuscular re-education  Plan of Care expires: 17  Plan of Care reviewed with: patient, daughter         Kim Brown, PTA  2017

## 2017-03-07 NOTE — PLAN OF CARE
Cm met with pt and family at bedside for initial discharge assessment.   Family requesting SNF, CM explained procedure and requested 3 choices.  CM explained therapy recommendations, and will follow.     03/06/17 0966   Discharge Assessment   Assessment Type Discharge Planning Assessment   Confirmed/corrected address and phone number on facesheet? Yes   Assessment information obtained from? Patient;Caregiver;Medical Record   Communicated expected length of stay with patient/caregiver yes   Prior to hospitilization cognitive status: Alert/Oriented   Prior to hospitalization functional status: Partially Dependent   Current cognitive status: Alert/Oriented   Current Functional Status: Partially Dependent   Arrived From home or self-care   Lives With alone  (grandson stays at night, with daughter during the day)   Able to Return to Prior Arrangements unable to determine at this time (comments)   Is patient able to care for self after discharge? Unable to determine at this time (comments)   Who are your caregiver(s) and their phone number(s)? Dai Torres, daughter, [phone]   Patient's perception of discharge disposition other (comments)  (unsure at this time, family requests SNF)   Readmission Within The Last 30 Days no previous admission in last 30 days   Patient currently being followed by outpatient case management? No   Patient currently receives home health services? No   Does the patient currently use HME? Yes   Do you have any problems affording any of your prescribed medications? No   Is the patient taking medications as prescribed? yes   Do you have any financial concerns preventing you from receiving the healthcare you need? No   Does the patient have transportation to healthcare appointments? Yes   Does the patient receive services at [facility]? No   Are there any open cases? No   Discharge Plan A Skilled Nursing Facility   Discharge Plan B Home Health;Home   Patient/Family In Agreement With  Plan yes

## 2017-03-08 LAB
1,25(OH)2D3 SERPL-MCNC: 41 PG/ML
ANION GAP SERPL CALC-SCNC: 9 MMOL/L
BUN SERPL-MCNC: 73 MG/DL
CA-I BLDV-SCNC: 1.03 MMOL/L
CALCIUM SERPL-MCNC: 10.1 MG/DL
CHLORIDE SERPL-SCNC: 107 MMOL/L
CO2 SERPL-SCNC: 22 MMOL/L
CREAT SERPL-MCNC: 3.3 MG/DL
EST. GFR  (AFRICAN AMERICAN): 14 ML/MIN/1.73 M^2
EST. GFR  (NON AFRICAN AMERICAN): 12 ML/MIN/1.73 M^2
GLUCOSE SERPL-MCNC: 69 MG/DL
POTASSIUM SERPL-SCNC: 4.4 MMOL/L
SODIUM SERPL-SCNC: 138 MMOL/L

## 2017-03-08 PROCEDURE — 25000003 PHARM REV CODE 250: Performed by: HOSPITALIST

## 2017-03-08 PROCEDURE — 99225 PR SUBSEQUENT OBSERVATION CARE,LEVEL II: CPT | Mod: ,,, | Performed by: PHYSICIAN ASSISTANT

## 2017-03-08 PROCEDURE — G0378 HOSPITAL OBSERVATION PER HR: HCPCS

## 2017-03-08 PROCEDURE — 63600175 PHARM REV CODE 636 W HCPCS: Performed by: PHYSICIAN ASSISTANT

## 2017-03-08 PROCEDURE — 80048 BASIC METABOLIC PNL TOTAL CA: CPT

## 2017-03-08 PROCEDURE — 82330 ASSAY OF CALCIUM: CPT

## 2017-03-08 PROCEDURE — 25000003 PHARM REV CODE 250: Performed by: INTERNAL MEDICINE

## 2017-03-08 PROCEDURE — 97535 SELF CARE MNGMENT TRAINING: CPT

## 2017-03-08 PROCEDURE — 36415 COLL VENOUS BLD VENIPUNCTURE: CPT

## 2017-03-08 PROCEDURE — 97530 THERAPEUTIC ACTIVITIES: CPT

## 2017-03-08 PROCEDURE — 25000003 PHARM REV CODE 250: Performed by: NURSE PRACTITIONER

## 2017-03-08 RX ORDER — CINACALCET 60 MG/1
120 TABLET, FILM COATED ORAL
Qty: 60 TABLET | Refills: 0 | Status: SHIPPED | OUTPATIENT
Start: 2017-03-08 | End: 2017-03-08 | Stop reason: CLARIF

## 2017-03-08 RX ORDER — CHOLECALCIFEROL (VITAMIN D3) 10 MCG
800 TABLET ORAL DAILY
Status: DISCONTINUED | OUTPATIENT
Start: 2017-03-08 | End: 2017-03-09 | Stop reason: HOSPADM

## 2017-03-08 RX ORDER — HYDRALAZINE HYDROCHLORIDE 20 MG/ML
10 INJECTION INTRAMUSCULAR; INTRAVENOUS EVERY 8 HOURS PRN
Status: DISCONTINUED | OUTPATIENT
Start: 2017-03-08 | End: 2017-03-09 | Stop reason: HOSPADM

## 2017-03-08 RX ORDER — CALCIUM CARB/VITAMIN D3/VIT K1 500-500-40
800 TABLET,CHEWABLE ORAL DAILY
Qty: 60 CAPSULE | Refills: 0 | Status: SHIPPED | OUTPATIENT
Start: 2017-03-08 | End: 2017-03-08 | Stop reason: CLARIF

## 2017-03-08 RX ADMIN — LEVOTHYROXINE SODIUM 150 MCG: 150 TABLET ORAL at 05:03

## 2017-03-08 RX ADMIN — CINACALCET HYDROCHLORIDE 120 MG: 30 TABLET, COATED ORAL at 09:03

## 2017-03-08 RX ADMIN — DONEPEZIL HYDROCHLORIDE 10 MG: 5 TABLET, FILM COATED ORAL at 09:03

## 2017-03-08 RX ADMIN — AMLODIPINE BESYLATE 5 MG: 5 TABLET ORAL at 09:03

## 2017-03-08 RX ADMIN — HEPARIN SODIUM 5000 UNITS: 5000 INJECTION, SOLUTION INTRAVENOUS; SUBCUTANEOUS at 09:03

## 2017-03-08 RX ADMIN — HEPARIN SODIUM 5000 UNITS: 5000 INJECTION, SOLUTION INTRAVENOUS; SUBCUTANEOUS at 02:03

## 2017-03-08 RX ADMIN — FLUOXETINE 20 MG: 20 CAPSULE ORAL at 09:03

## 2017-03-08 RX ADMIN — Medication 800 UNITS: at 12:03

## 2017-03-08 RX ADMIN — ASPIRIN 81 MG: 81 TABLET, COATED ORAL at 09:03

## 2017-03-08 RX ADMIN — HYDRALAZINE HYDROCHLORIDE 10 MG: 20 INJECTION INTRAMUSCULAR; INTRAVENOUS at 06:03

## 2017-03-08 RX ADMIN — CETIRIZINE HYDROCHLORIDE 5 MG: 5 TABLET, FILM COATED ORAL at 09:03

## 2017-03-08 RX ADMIN — HEPARIN SODIUM 5000 UNITS: 5000 INJECTION, SOLUTION INTRAVENOUS; SUBCUTANEOUS at 05:03

## 2017-03-08 RX ADMIN — QUETIAPINE FUMARATE 25 MG: 25 TABLET, FILM COATED ORAL at 08:03

## 2017-03-08 NOTE — PROGRESS NOTES
PT /77. JEFF Ordonez notified. Hydralazine 10 mg IVP for SBP >180 ordered. Will administer and continue to monitor.

## 2017-03-08 NOTE — PLAN OF CARE
[facility]    HOME HEALTH ORDERS  FACE TO FACE ENCOUNTER    Patient Name: Mary Singleton  YOB: 1927    PCP: Susana Arvizu MD   PCP Address: [address] [place] [address]  PCP Phone Number: [phone]  PCP Fax: [phone]    Encounter Date: 03/08/2017    Admit to Home Health    Diagnoses:  Active Hospital Problems    Diagnosis  POA    *Hypercalcemia [E83.52]  Yes    Generalized weakness [R53.1]  Yes    Dementia [F03.90]  Yes     2012: Diagnosed with Alzheimer's Dementia.      Chronic kidney disease, stage IV (severe) [N18.4]  Yes     11/15/2012: BUN/crea 38/2.1. CrCl 22.  Right arm fistula.      Hyperparathyroidism [E21.3]  Yes     2010: Diagnosed.      Hypertension [I10]  Yes     2000: Diagnosed.        Resolved Hospital Problems    Diagnosis Date Resolved POA   No resolved problems to display.       Future Appointments  Date Time Provider Department Center   5/3/2017 10:30 AM MD DANTE FigueroaK [facility]           I have seen and examined this patient face to face today. My clinical findings that support the need for the home health skilled services and home bound status are the following:  Weakness/numbness causing balance and gait disturbance due to Weakness/Debility making it taxing to leave home.  Requiring assistive device to leave home due to unsteady gait caused by  Weakness/Debility.    Allergies:  Review of patient's allergies indicates:   Allergen Reactions    Penicillins      Doesn't remember it has been years       Diet: renal diet    Activities: activity as tolerated    Nursing:   SN to complete comprehensive assessment including routine vital signs. Instruct on disease process and s/s of complications to report to MD. Review/verify medication list sent home with the patient at time of discharge  and instruct patient/caregiver as needed. Frequency may be adjusted depending on start of care date.    Notify MD if SBP > 160 or <  90; DBP > 90 or < 50; HR > 120 or < 50; Temp > 101; Other:         CONSULTS:    Physical Therapy to evaluate and treat. Evaluate for home safety and equipment needs; Establish/upgrade home exercise program. Perform / instruct on therapeutic exercises, gait training, transfer training, and Range of Motion.  Occupational Therapy to evaluate and treat. Evaluate home environment for safety and equipment needs. Perform/Instruct on transfers, ADL training, ROM, and therapeutic exercises.  Aide to provide assistance with personal care, ADLs, and vital signs.    MISCELLANEOUS CARE:  PT 5 days a week.  OT 5 days a week.     WOUND CARE ORDERS  n/a      Medications: Review discharge medications with patient and family and provide education.      Current Discharge Medication List      CONTINUE these medications which have NOT CHANGED    Details   amlodipine (NORVASC) 2.5 MG tablet Take 2.5 mg by mouth once daily.      aspirin (ECOTRIN) 81 MG EC tablet Take 1 tablet (81 mg total) by mouth once daily.  Qty: 90 tablet, Refills: 3    Associated Diagnoses: Bilateral carotid artery stenosis      calcitRIOL (ROCALTROL) 0.25 MCG Cap Take 0.25 mcg by mouth once daily.       cetirizine (ZYRTEC) 5 MG tablet Take 5 mg by mouth once daily.      cholecalciferol, vitamin D3, (VITAMIN D3) 2,000 unit Cap Take 1 capsule by mouth once daily.      donepezil (ARICEPT) 10 MG tablet Take 10 mg by mouth once daily.       fluoxetine (PROZAC) 10 MG capsule Take 20 mg by mouth once daily.       levothyroxine (SYNTHROID) 150 MCG tablet Take 150 mcg by mouth once daily.      quetiapine (SEROQUEL) 25 MG Tab Take 25 mg by mouth once daily.      acetaminophen (TYLENOL) 500 mg Cap       DENOSUMAB (PROLIA SUBQ) Inject into the skin.             I certify that this patient is confined to her home and needs physical therapy and occupational therapy.

## 2017-03-08 NOTE — PLAN OF CARE
I spoke with daughter Dai and son at bedside re: discharge plans. They were notified per Susanne HOBBS that patient was declined by [facility], [facility] and [facility]. They continue to say that the therapists said she needs SNF and they think she will do better after having SNF for awhile before returning home. I explained again that she was denied by the 3 SNF's that they chose and we now have 2 options. 1) She can go home with HH, and 2) they can give more SNF choices. They said they do not want to do HH because last time they did HH, they didn't come to see her for a week, and the aid that was supposed to bath her never came.  Dai asked if she was going to have a day or two to see if they could get accepted and I told her that she was actually discharged to day, so we need to decide what we are going to do today.  I gave them the [employer] SNF list and asked them to call me with other choices. Dai said she would.  ANJEL to follow up.

## 2017-03-08 NOTE — PLAN OF CARE
I spoke with daughter Dai again RE: SNF choices vs HH. She states Dr. Arvizu just came in and told her they were not going anywhere today. I told her we still needed to work on the discharge plan. I asked if it was ok to send referrals to other SNF's to see if we could get any approvals. She said that would be fine.   Referrals sent to:   [facility]  [facility]    Daughter notified I would call in the am to see if any accepted patient, but if no one accepted, she would have to go home with home health. I also notified her that because the patient was in Observation status she may be accumulating bills for services. She states last time she did not get billed because she had a diagnosis. I explained to her the about Observation status and that it was for 24-48 hours to determine what was wrong with the patient and what was needed. IF they were not able to meet Inpatient criteria they should be able to be discharged or move to next level of care. She stated she understands.    Sheryl Galo PA-C notified.

## 2017-03-08 NOTE — PLAN OF CARE
Problem: Occupational Therapy Goal  Goal: Occupational Therapy Goal  Goals to be met by 4/10/2017    1. Feeding with Minimum Assistance.  2. Sitting balance with SBA with verbal prompts for 15 minutes.   3. Assess transfer to bedside commode. ---Goal met 3/7/2017  NEW GOAL: BSC stand pivot tf/ with CGA.  4. Assess UBD.----Goal met 3/7/2017  NEW GOAL: UBD Min Assist supported sitting.   5. CGA with RW for grooming at sink (NEW GOAL 03/08/17)  6. Min assist toilet hygiene and clothing mgmt (NEW GOAL 03/08/17)  Outcome: Ongoing (interventions implemented as appropriate)  Improved functioning with vc for initiation, follow through and termination of tasks.  Continue with OT to progress toward goals.    Comments:   MAGUE Dewitt, 3/8/2017

## 2017-03-08 NOTE — PT/OT/SLP PROGRESS
"Occupational Therapy  Treatment    Mary Singleton   MRN: [id number]   Admitting Diagnosis: Hypercalcemia    OT Date of Treatment: 03/08/17   OT Start Time: 1340  OT Stop Time: 1412  OT Total Time (min): 32 min    Billable Minutes:  Self Care/Home Management 32    General Precautions: Standard, anti-coagulation medicine, hearing impaired (fall risk, renal diet)  Orthopedic Precautions: N/A  Braces: N/A    Do you have any cultural, spiritual, Roman Catholic conflicts, given your current situation?: None specified    Subjective:  Communicated with nsg prior to session.  "I'm tired"    Pain Rating:  (only with c/o LE pain with assist into bed, but unable to rate)              Pain Rating Post-Intervention: 0/10    Objective:  Patient found with: telemetry     Functional Mobility:  Bed Mobility:  Sit to Supine: Minimum Assistance (LE)    Transfers:   Sit <> Stand Assistance: Minimum Assistance (from bedside chair 2 trials)  Sit <> Stand Assistive Device: Rolling Walker  Toilet Transfer Technique: Stand Pivot  Toilet Transfer Assistance: Minimum Assistance  Toilet Transfer Assistive Device: Rolling Walker (BSC over toilet)    Functional Ambulation: CGA to Min assist and constant cues to stay within RW    Activities of Daily Living:  Grooming Position: Standing at sink  Grooming Level of Assistance: Minimum assistance (tremors with grooming tools)  Toileting Where Assessed: Toilet (with BSC over)  Toileting Level of Assistance: Maximum assistance (for clothing mgmt and almaz care after BM)    Balance:   Static Sit: FAIR: Maintains without assist, but unable to take any challenges   Dynamic Sit: FAIR: Cannot move trunk without losing balance  Static Stand: POOR+: Needs MINIMAL assist to maintain  Dynamic stand: FAIR -: Needs CONTACT GUARD to Min assist during gait      AM-PAC 6 CLICK ADL   How much help from another person does this patient currently need?   1 = Unable, Total/Dependent Assistance  2 = A lot, Maximum/Moderate " Assistance  3 = A little, Minimum/Contact Guard/Supervision  4 = None, Modified [place]/Independent    Putting on and taking off regular lower body clothing? : 2  Bathing (including washing, rinsing, drying)?: 2  Toileting, which includes using toilet, bedpan, or urinal? : 2  Putting on and taking off regular upper body clothing?: 2  Taking care of personal grooming such as brushing teeth?: 3  Eating meals?: 3  Total Score: 14     AM-PAC Raw Score CMS G-Code Modifier Level of Impairment Assistance   6 % Total / Unable   7 - 9 CM 80 - 100% Maximal Assist   10 - 14 CL 60 - 80% Moderate Assist   15 - 19 CK 40 - 60% Moderate Assist   20 - 22 CJ 20 - 40% Minimal Assist   23 CI 1-20% SBA / CGA   24 CH 0% Independent/ Mod I     Patient left HOB elevated with all lines intact, call button in reach, bed alarm on, nsg notified and family present    ASSESSMENT:  Mary Singleton is a 89 y.o. female with a medical diagnosis of Hypercalcemia and presents with impaired balance, endurance and strength for basic ADL task performance and functional transfers/mobility with RW. Pt requires cues for initiating tasks, somewhat complicated by [place], but does perform all activities as requested.  Cues to remain within RW, but overall improving mobility with RW and min assist.  (B) UE tremors noted during grooming tasks at sink, requiring assist for tool manipulation.  Recommend SNF for improved functioning and to decrease caregiver burden.    Rehab identified problem list/impairments: Rehab identified problem list/impairments: weakness, impaired endurance, impaired self care skills, impaired functional mobilty, gait instability, impaired balance, impaired cognition, decreased safety awareness, other (comment) ((B) UE tremors noted during grooming)    Rehab potential is good.    Activity tolerance: Good    Discharge recommendations: Discharge Facility/Level Of Care Needs: nursing facility, skilled     Barriers to discharge:  Barriers to Discharge: Decreased caregiver support    Equipment recommendations: walker, rolling (TTB vs shower chair (per OT eval, pt with BSC))     GOALS:   Occupational Therapy Goals        Problem: Occupational Therapy Goal    Goal Priority Disciplines Outcome Interventions   Occupational Therapy Goal     OT, PT/OT Ongoing (interventions implemented as appropriate)    Description:  Goals to be met by 4/10/2017    1. Feeding with Minimum Assistance.  2. Sitting balance with SBA with verbal prompts for 15 minutes.   3. Assess transfer to bedside commode. ---Goal met 3/7/2017  NEW GOAL:  BSC stand pivot tf/ with CGA.  4. Assess UBD.----Goal met 3/7/2017  NEW GOAL:  UBD Min  Assist supported sitting.   5. CGA with RW for grooming at sink (NEW GOAL 03/08/17)  6. Min assist toilet hygiene and clothing mgmt (NEW GOAL 03/08/17)              Plan:  Patient to be seen 6 x/week to address the above listed problems via self-care/home management, therapeutic activities, therapeutic exercises  Plan of Care expires: 04/10/17  Plan of Care reviewed with: patient, family         MAGUE Dewitt  03/08/2017

## 2017-03-08 NOTE — PROGRESS NOTES
"Nephrology  Progress Note    Admit Date: 3/5/2017   LOS: 0 days     SUBJECTIVE:     Follow-up For:  Hypercalcemia    Interval History:    Looks/feels better this am.  Discussed with family at bedside.  Awaiting SNF placement vs HH.  Aniket wnl.  Denies CP/SOB.     Review of Systems:  Constitutional: No fever or chills  Respiratory: No cough or shortness of breath  Cardiovascular: No chest pain or palpitations  Gastrointestinal: No nausea or vomiting  Neurological: No confusion or weakness    OBJECTIVE:     Vital Signs Range (Last 24H):  BP (!) 156/64 (BP Location: Left arm, Patient Position: Lying, BP Method: Automatic)  Pulse 83  Temp 98.3 °F (36.8 °C) (Oral)   Resp 16  Ht 5' 4" (1.626 m)  Wt 58.3 kg (128 lb 8.5 oz)  SpO2 96%  Breastfeeding? No  BMI 22.06 kg/m2    Temp:  [97.5 °F (36.4 °C)-98.4 °F (36.9 °C)]   Pulse:  []   Resp:  [16-18]   BP: (129-189)/(64-82)   SpO2:  [96 %-98 %]     I & O (Last 24H):    Intake/Output Summary (Last 24 hours) at 03/08/17 1048  Last data filed at 03/08/17 0900   Gross per 24 hour   Intake              180 ml   Output              300 ml   Net             -120 ml       Physical Exam:  General appearance: elderly frail/weak.  [place]  Eyes: Conjunctivae/corneas clear. PERRL.  Lungs: Normal respiratory effort, clear to auscultation bilaterally   Heart: Regular rate and rhythm, S1, S2 normal, no murmur, rub or julio c.  Abdomen: Soft, non-tender non-distended; bowel sounds normal; no masses, no organomegaly  Extremities: No cyanosis or clubbing. No edema.   Skin: Skin color, texture, turgor normal. No rashes or lesions  Neurologic: No focal weakness but generalized weakness.  Right Arm AVF      Laboratory Data:  No results for input(s): WBC, RBC, HGB, HCT, PLT, MCV, MCH, MCHC in the last 24 hours.    BMP:   Recent Labs  Lab 03/06/17  0524  03/08/17  0444   GLU 84  < > 69*     < > 138   K 4.2  < > 4.4     < > 107   CO2 25  < > 22*   BUN 78*  < > 73*   CREATININE 3.0*  " < > 3.3*   CALCIUM 11.8*  < > 10.1   MG 2.3  --   --    < > = values in this interval not displayed.  Lab Results   Component Value Date    .2 (H) 03/06/2017    CALCIUM 10.1 03/08/2017    CAION 1.03 (L) 03/08/2017    PHOS 3.8 02/06/2017             Medications:  Medication list was reviewed and changes noted under Assessment/Plan.    Diagnostic Results:        ASSESSMENT/PLAN:     1. 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 Sensipar increased to 120mg.  Pt also due to receive prolia next month which will also help. Ionized calcium wnl this am. The degree of hypercalcemia unlikely the cause of her weakness, especially since son reported that it was waxing and waning.  Weakness is improving with PT/OT.  Will restart low dose Vit D 800 IU/daily.  2. 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 Renal fxn has stabilized and HD is on hold for now. Follow trends. No need for RRT at this time.  Will defer to Dr. Arvizu.    3. HTN (I12.9): CCB/BB for now. No ACE/ARB.   4. Worsening dementia (F03.90): Patient has had a rapid decline in her mental status despite Aricept.  5. Dispo: Likely needs SNF due to weakness and pt living alone.       Cleared from renal for DC.  F/U with Dr. Arvizu 1-2 weeks.

## 2017-03-08 NOTE — PLAN OF CARE
Problem: Physical Therapy Goal  Goal: Physical Therapy Goal  Goals to be met by: 17     Patient will increase functional independence with mobility by performin. Supine to sit with Contact Guard Assistance MET 3/8/17  2. Sit to supine with Contact Guard Assistance  3. Sit to stand transfer with Contact Guard Assistance MET 3/8/17  4. Gait x 50 feet with Minimal Assistance using Rolling Walker. MET 3/8/17   Outcome: Ongoing (interventions implemented as appropriate)     Progressing well towards goals

## 2017-03-08 NOTE — PT/OT/SLP PROGRESS
"Physical Therapy  Treatment    Mary Singleton   MRN: [id number]   Admitting Diagnosis: Hypercalcemia    PT Received On: 03/08/17  PT Start Time: 1232     PT Stop Time: 1255    PT Total Time (min): 23 min       Billable Minutes:  Therapeutic Activity 23    Treatment Type: Treatment  PT/PTA: PTA     PTA Visit Number: 2       General Precautions: Standard, fall  Orthopedic Precautions: N/A   Braces: N/A    Do you have any cultural, spiritual, Shinto conflicts, given your current situation?: none specified    Subjective:  Communicated with prior to session. Pt. Stated " how far do I need to walk"     Pain Rating:  (complained of pain in bilateral LE's but never rated pain )      Objective:   Patient found with: telemetry supine in bed with family present     Functional Mobility:   Bed Mobility:  Trunk support   Supine to Sit: Contact Guard Assistance    Transfers: sit to stand from bed with RW   Sit <> Stand Assistance: Contact Guard Assistance  Sit <> Stand Assistive Device: Rolling Walker    Gait:    Gait Distance: 50 feet with RW with CGA   Gait Assistive Device: Rolling walker  Gait Pattern: reciprocal  Gait Deviation(s): decreased bryan, decreased step length, decreased weight-shifting ability    AM-PAC 6 CLICK MOBILITY  How much help from another person does this patient currently need?   1 = Unable, Total/Dependent Assistance  2 = A lot, Maximum/Moderate Assistance  3 = A little, Minimum/Contact Guard/Supervision  4 = None, Modified [place]/Independent    Turning over in bed (including adjusting bedclothes, sheets and blankets)?: 3  Sitting down on and standing up from a chair with arms (e.g., wheelchair, bedside commode, etc.): 3  Moving from lying on back to sitting on the side of the bed?: 3  Moving to and from a bed to a chair (including a wheelchair)?: 3  Need to walk in hospital room?: 3  Climbing 3-5 steps with a railing?: 3  Total Score: 18    AM-PAC Raw Score CMS G-Code Modifier Level of " Impairment Assistance   6 % Total / Unable   7 - 9 CM 80 - 100% Maximal Assist   10 - 14 CL 60 - 80% Moderate Assist   15 - 19 CK 40 - 60% Moderate Assist   20 - 22 CJ 20 - 40% Minimal Assist   23 CI 1-20% SBA / CGA   24 CH 0% Independent/ Mod I     Patient left up in chair with all lines intact, call button in reach and nurse notified.    Assessment:  Mary Singleton is a 89 y.o. female with a medical diagnosis of Hypercalcemia patient tolerated treatment session fairly well. Patient required encouragement to progress and ambulate. Patient will cont. To benefit from skilled PT services to increase strength, endurance and functional mobility.     Rehab identified problem list/impairments: Rehab identified problem list/impairments: weakness, impaired endurance, gait instability, impaired functional mobilty, impaired self care skills, impaired balance, impaired cognition, decreased safety awareness    Rehab potential is fair.    Activity tolerance: Fair    Discharge recommendations:    Skilled nursing facility patient will need 24/7 assistance upon discharge    Barriers to discharge: Barriers to Discharge: Decreased caregiver support    Equipment recommendations: Equipment Needed After Discharge: walker, rolling (transport wheelchair)     GOALS:   Physical Therapy Goals        Problem: Physical Therapy Goal    Goal Priority Disciplines Outcome Goal Variances Interventions   Physical Therapy Goal     PT/OT, PT Ongoing (interventions implemented as appropriate)     Description:  Goals to be met by: 17     Patient will increase functional independence with mobility by performin. Supine to sit with Contact Guard Assistance  2. Sit to supine with Contact Guard Assistance  3. Sit to stand transfer with Contact Guard Assistance  4. Gait  x 50 feet with Minimal Assistance using Rolling Walker.                 PLAN:    Patient to be seen 6 x/week  to address the above listed problems via gait training,  therapeutic activities, therapeutic exercises, neuromuscular re-education  Plan of Care expires: 04/05/17  Plan of Care reviewed with: patient, daughter         Telma Bell, PTA  03/08/2017

## 2017-03-08 NOTE — DISCHARGE INSTRUCTIONS
Your feedback is important to us.  If you should receive a survey in the next few days, please share your experience with us.

## 2017-03-09 VITALS
HEIGHT: 64 IN | SYSTOLIC BLOOD PRESSURE: 141 MMHG | HEART RATE: 101 BPM | TEMPERATURE: 98 F | BODY MASS INDEX: 21.94 KG/M2 | OXYGEN SATURATION: 95 % | DIASTOLIC BLOOD PRESSURE: 66 MMHG | WEIGHT: 128.5 LBS | RESPIRATION RATE: 16 BRPM

## 2017-03-09 LAB
ANION GAP SERPL CALC-SCNC: 8 MMOL/L
BUN SERPL-MCNC: 71 MG/DL
CA-I BLDV-SCNC: 1.27 MMOL/L
CALCIUM SERPL-MCNC: 9.5 MG/DL
CHLORIDE SERPL-SCNC: 106 MMOL/L
CO2 SERPL-SCNC: 26 MMOL/L
CREAT SERPL-MCNC: 3.2 MG/DL
EST. GFR  (AFRICAN AMERICAN): 14 ML/MIN/1.73 M^2
EST. GFR  (NON AFRICAN AMERICAN): 12 ML/MIN/1.73 M^2
GLUCOSE SERPL-MCNC: 79 MG/DL
POTASSIUM SERPL-SCNC: 4.1 MMOL/L
PTH RELATED PROT SERPL-SCNC: 0.6 PMOL/L
SODIUM SERPL-SCNC: 140 MMOL/L
TB INDURATION 48 - 72 HR READ: 0 MM

## 2017-03-09 PROCEDURE — 25000003 PHARM REV CODE 250: Performed by: INTERNAL MEDICINE

## 2017-03-09 PROCEDURE — 97530 THERAPEUTIC ACTIVITIES: CPT

## 2017-03-09 PROCEDURE — G8988 SELF CARE GOAL STATUS: HCPCS | Mod: CK

## 2017-03-09 PROCEDURE — G0378 HOSPITAL OBSERVATION PER HR: HCPCS

## 2017-03-09 PROCEDURE — 36415 COLL VENOUS BLD VENIPUNCTURE: CPT

## 2017-03-09 PROCEDURE — 82330 ASSAY OF CALCIUM: CPT

## 2017-03-09 PROCEDURE — 97535 SELF CARE MNGMENT TRAINING: CPT

## 2017-03-09 PROCEDURE — 97116 GAIT TRAINING THERAPY: CPT

## 2017-03-09 PROCEDURE — 99225 PR SUBSEQUENT OBSERVATION CARE,LEVEL II: CPT | Mod: ,,, | Performed by: PHYSICIAN ASSISTANT

## 2017-03-09 PROCEDURE — 80048 BASIC METABOLIC PNL TOTAL CA: CPT

## 2017-03-09 PROCEDURE — 25000003 PHARM REV CODE 250: Performed by: HOSPITALIST

## 2017-03-09 PROCEDURE — G8989 SELF CARE D/C STATUS: HCPCS | Mod: CK

## 2017-03-09 PROCEDURE — 25000003 PHARM REV CODE 250: Performed by: NURSE PRACTITIONER

## 2017-03-09 RX ORDER — FLUOXETINE HYDROCHLORIDE 20 MG/1
20 CAPSULE ORAL DAILY
Status: ON HOLD
Start: 2017-03-09 | End: 2017-04-24 | Stop reason: HOSPADM

## 2017-03-09 RX ORDER — MEGESTROL ACETATE 20 MG/1
40 TABLET ORAL DAILY
Qty: 60 TABLET | Refills: 11
Start: 2017-03-09 | End: 2017-04-18

## 2017-03-09 RX ORDER — FLUOXETINE 10 MG/1
20 CAPSULE ORAL DAILY
Start: 2017-03-09 | End: 2017-03-09

## 2017-03-09 RX ORDER — CINACALCET 60 MG/1
120 TABLET, FILM COATED ORAL
Qty: 60 TABLET | Refills: 0 | Status: ON HOLD | OUTPATIENT
Start: 2017-03-09 | End: 2017-04-24 | Stop reason: HOSPADM

## 2017-03-09 RX ADMIN — CINACALCET HYDROCHLORIDE 120 MG: 30 TABLET, COATED ORAL at 09:03

## 2017-03-09 RX ADMIN — HEPARIN SODIUM 5000 UNITS: 5000 INJECTION, SOLUTION INTRAVENOUS; SUBCUTANEOUS at 05:03

## 2017-03-09 RX ADMIN — Medication 800 UNITS: at 09:03

## 2017-03-09 RX ADMIN — HEPARIN SODIUM 5000 UNITS: 5000 INJECTION, SOLUTION INTRAVENOUS; SUBCUTANEOUS at 01:03

## 2017-03-09 RX ADMIN — CETIRIZINE HYDROCHLORIDE 5 MG: 5 TABLET, FILM COATED ORAL at 09:03

## 2017-03-09 RX ADMIN — DONEPEZIL HYDROCHLORIDE 10 MG: 5 TABLET, FILM COATED ORAL at 09:03

## 2017-03-09 RX ADMIN — ASPIRIN 81 MG: 81 TABLET, COATED ORAL at 09:03

## 2017-03-09 RX ADMIN — AMLODIPINE BESYLATE 5 MG: 5 TABLET ORAL at 09:03

## 2017-03-09 RX ADMIN — FLUOXETINE 20 MG: 20 CAPSULE ORAL at 09:03

## 2017-03-09 RX ADMIN — LEVOTHYROXINE SODIUM 150 MCG: 150 TABLET ORAL at 05:03

## 2017-03-09 NOTE — SUBJECTIVE & OBJECTIVE
Interval History: Patient is new to me. No new complaints this AM. Uneventful overnight.     Review of Systems   Constitutional: Negative for chills, fatigue and fever.   Respiratory: Negative for cough, shortness of breath and wheezing.    Cardiovascular: Negative for chest pain and palpitations.   Gastrointestinal: Negative for abdominal pain, constipation, diarrhea, nausea and vomiting.   Genitourinary: Negative for dysuria, frequency and hematuria.   Neurological: Positive for weakness. Negative for dizziness and headaches.     Objective:     Vital Signs (Most Recent):  Temp: 97.5 °F (36.4 °C) (03/08/17 1924)  Pulse: 93 (03/08/17 1924)  Resp: 18 (03/08/17 1924)  BP: (!) 143/66 (03/08/17 1924)  SpO2: 98 % (03/08/17 1924) Vital Signs (24h Range):  Temp:  [97.5 °F (36.4 °C)-98.3 °F (36.8 °C)] 97.5 °F (36.4 °C)  Pulse:  [] 93  Resp:  [16-18] 18  SpO2:  [96 %-98 %] 98 %  BP: (135-184)/(64-77) 143/66     Weight: 58.3 kg (128 lb 8.5 oz)  Body mass index is 22.06 kg/(m^2).    Intake/Output Summary (Last 24 hours) at 03/08/17 2339  Last data filed at 03/08/17 2200   Gross per 24 hour   Intake              780 ml   Output              550 ml   Net              230 ml      Physical Exam   Constitutional: She is oriented to person, place, and time. She appears well-developed and well-nourished. No distress.   Thin female.    HENT:   Head: Normocephalic and atraumatic.   Right Ear: External ear normal.   Left Ear: External ear normal.   Eyes: Conjunctivae and EOM are normal. Pupils are equal, round, and reactive to light. No scleral icterus.   Neck: Normal range of motion. Neck supple. No tracheal deviation present.   Cardiovascular: Normal rate, regular rhythm, normal heart sounds and intact distal pulses.  Exam reveals no gallop and no friction rub.    No murmur heard.  2+ distal radial/DT/PT pulses.    Pulmonary/Chest: Effort normal and breath sounds normal. No stridor. No respiratory distress. She has no wheezes.  She has no rales. She exhibits no tenderness.   Bilateral lung fields CTA.     Abdominal: Soft. Bowel sounds are normal. She exhibits no distension. There is no tenderness.   Musculoskeletal: Normal range of motion. She exhibits no deformity.   Neurological: She is alert and oriented to person, place, and time. No cranial nerve deficit. She exhibits normal muscle tone.   Skin: Skin is warm and dry. No rash noted. She is not diaphoretic. No erythema. No pallor.   Psychiatric: She has a normal mood and affect. Her behavior is normal. Judgment and thought content normal.   Nursing note and vitals reviewed.      Significant Labs:   BMP:   Recent Labs  Lab 03/08/17  0444   GLU 69*      K 4.4      CO2 22*   BUN 73*   CREATININE 3.3*   CALCIUM 10.1     CBC: No results for input(s): WBC, HGB, HCT, PLT in the last 48 hours.  All pertinent labs within the past 24 hours have been reviewed.    Significant Imaging: I have reviewed all pertinent imaging results/findings within the past 24 hours.

## 2017-03-09 NOTE — PLAN OF CARE
Problem: Physical Therapy Goal  Goal: Physical Therapy Goal  Goals to be met by: 17     Patient will increase functional independence with mobility by performin. Supine to sit with Contact Guard Assistance MET 3/8/17  2. Sit to supine with Contact Guard Assistance  3. Sit to stand transfer with Contact Guard Assistance MET 3/8/17  4. Gait x 50 feet with Minimal Assistance using Rolling Walker. MET 3/8/17   Outcome: Ongoing (interventions implemented as appropriate)     Required Min A for balance on this day

## 2017-03-09 NOTE — PLAN OF CARE
Problem: Occupational Therapy Goal  Goal: Occupational Therapy Goal  Goals to be met by 4/10/2017    1. Feeding with Minimum Assistance.  2. Sitting balance with SBA with verbal prompts for 15 minutes.   3. Assess transfer to bedside commode. ---Goal met 3/7/2017  NEW GOAL: BSC stand pivot tf/ with CGA.---[place] met 3/9/2017  4. Assess UBD.----Goal met 3/7/2017  NEW GOAL: UBD Min Assist supported sitting.   5. CGA with RW for grooming at sink (NEW GOAL 03/08/17)  6. Min assist toilet hygiene and clothing mgmt (NEW GOAL 03/08/17) ---Goal met 3/9/2017  Outcome: Ongoing (interventions implemented as appropriate)  Pt. Met 2 OT goals today.  Progressing in standing balance and ADLS.  Grooming standing at sink with SBA-CGA for hand hygiene and to brush teeth; sat to wash face with SUP and comb hair with min assist.  Toileting min assist for clothes management with RW; ambulated with RW and CGA from BS chair>sink>Bs chair. Toilet t/f with RW and 3n1 frame over toilet with CGA.  Sponge bath mod assist; UB dressing max assist with pullover tech; LB dressing mod assist to thread legs into pants and briefs and cga to pull over hips in stance.  Possible discharge today to SNF.  Continue with OT POC.

## 2017-03-09 NOTE — ASSESSMENT & PLAN NOTE
- followed by [facility]   - Improved after IVF, 9.5 today  - Sensipar increased to 120mg and holding calcitriol  - Vitamin D restarted at 800 U daily    - Due to receive prolia next month

## 2017-03-09 NOTE — DISCHARGE SUMMARY
[facility] Hospital Medicine  Discharge Summary      Patient Name: Mary Singleton  MRN: [id number]  Admission Date: 3/5/2017  Hospital Length of Stay: 0 days  Discharge Date and Time:  03/09/2017 11:56 AM  Attending Physician: Clara Gandara MD   Discharging Provider: Clara Gandara MD  Primary Care Provider: Susana Arvizu MD      HPI:   Ms. Mary Singleton is a 89 y.o. Female, with PMH of CKD IV (right arm AV fistula),  Primary hypothyroidism (s/p 1 parathyroidectomy  on Sensipar (recently increased to 120 mg 3 days PTA)/VitD/Calcitriol), parathyroidectomy, HTN, CAD, Alzheimer's Dementia, Osteoperosis , PVD (L CEA 2002), who presented to the ED with weakness x 1 day and difficulty ambulating, and was found to have hypercalcemia (11.9, Ionized 1.49). She normally walks with a walker, and performs most of her ADLs by herself, but required assistance eating and moving in bed. This was concerning to the family as the patient lives alone in her home.     * No surgery found *      Indwelling Lines/Drains at time of discharge:   Lines/Drains/Airways     Drain                 Hemodialysis AV Fistula Right forearm -- days              Hospital Course:   The patient was admitted for treatment of her elevated calcium level (11.9). She was treated with IF fluids, and increased in Sensipar to 120 mg. Calcium levels dropped to 10.8, and this AM to 10.1. Ionized calcium reduced from 1.49 to 1.03. She was restarted on her Vitamin D 800 U daily. She is currently awaiting SNF placement.   On 03/09/2017 the patient was accepted to [facility].        Consults:   Consults         Status Ordering Provider     Inpatient consult to [facility]  Once     Provider:  (Not yet assigned)    Completed HAILEE SAPP     Inpatient consult to SNF  Once     Provider:  (Not yet assigned)    Completed CLARA GANDARA          Significant Diagnostic Studies: Labs:   BMP:     Recent Labs  Lab 03/08/17  0444 03/09/17  0421   GLU  69* 79    140   K 4.4 4.1    106   CO2 22* 26   BUN 73* 71*   CREATININE 3.3* 3.2*   CALCIUM 10.1 9.5   , CMP     Recent Labs  Lab 03/08/17  0444 03/09/17  0421    140   K 4.4 4.1    106   CO2 22* 26   GLU 69* 79   BUN 73* 71*   CREATININE 3.3* 3.2*   CALCIUM 10.1 9.5   ANIONGAP 9 8   ESTGFRAFRICA 14* 14*   EGFRNONAA 12* 12*    and All labs within the past 24 hours have been reviewed    Pending Diagnostic Studies:     None        Final Active Diagnoses:    Diagnosis Date Noted POA    PRINCIPAL PROBLEM:  Hypercalcemia [E83.52] 03/06/2017 Yes    Generalized weakness [R53.1] 03/06/2017 Yes    Dementia [F03.90] 11/02/2016 Yes    Chronic kidney disease, stage IV (severe) [N18.4] 07/25/2013 Yes    Hyperparathyroidism [E21.3] 07/25/2013 Yes    Hypertension [I10] 07/25/2013 Yes      Problems Resolved During this Admission:    Diagnosis Date Noted Date Resolved POA      * Hypercalcemia  - followed by [facility]   - Improved after IVF, 9.5 today  - Sensipar increased to 120mg and holding calcitriol  - Vitamin D restarted at 800 U daily    - Due to receive prolia next month     Hypertension  - Resume home medication:Amlodipine 2.5 mg QD            Hyperparathyroidism  - TSH = 0.643  - continue Synthroid 150 mcg QD   - Follow up with PCP     Chronic kidney disease, stage IV (severe)  - Creatinine stable   - HD on hold for now        Dementia  - Continue Donepezil   - stable    Generalized weakness  - evaluated by PT/OT - patient needs 6x/week PT/OT upon discharge   - weakness improving        Discharged Condition: stable    Disposition: Home or Self Care    Follow Up:  Follow-up Information     Follow up with Susana Arvizu MD. Schedule an appointment as soon as possible for a visit in 1 week.    Specialty:  Nephrology    Contact information:    [address] [place] [address] [id number] [phone]          Follow up with Meseret Briones.    Specialties:  Nursing Home Agency,  SNF Agency    Why:  SNF    Contact information:    [address] [facility] [address]  [phone]          Patient Instructions:     Diet general     Activity as tolerated     Call MD for:  temperature >100.4     Call MD for:  persistent nausea and vomiting or diarrhea     Call MD for:  severe uncontrolled pain     Call MD for:  redness, tenderness, or signs of infection (pain, swelling, redness, odor or green/yellow discharge around incision site)     Call MD for:  difficulty breathing or increased cough     Call MD for:  severe persistent headache     Call MD for:  worsening rash     Call MD for:  persistent dizziness, light-headedness, or visual disturbances     Call MD for:  increased confusion or weakness       Medications:  Reconciled Home Medications:   Current Discharge Medication List      START taking these medications    Details   cinacalcet (SENSIPAR) 60 MG Tab Take 2 tablets (120 mg total) by mouth daily with breakfast.  Qty: 60 tablet, Refills: 0      megestrol (MEGACE) 20 MG Tab Take 2 tablets (40 mg total) by mouth once daily.  Qty: 60 tablet, Refills: 11         CONTINUE these medications which have CHANGED    Details   fluoxetine (PROZAC) 20 MG capsule Take 1 capsule (20 mg total) by mouth once daily.    Associated Diagnoses: Depression, unspecified depression type         CONTINUE these medications which have NOT CHANGED    Details   amlodipine (NORVASC) 2.5 MG tablet Take 2.5 mg by mouth once daily.      aspirin (ECOTRIN) 81 MG EC tablet Take 1 tablet (81 mg total) by mouth once daily.  Qty: 90 tablet, Refills: 3    Associated Diagnoses: Bilateral carotid artery stenosis      calcitRIOL (ROCALTROL) 0.25 MCG Cap Take 0.25 mcg by mouth once daily.       cetirizine (ZYRTEC) 5 MG tablet Take 5 mg by mouth once daily.      donepezil (ARICEPT) 10 MG tablet Take 10 mg by mouth once daily.       levothyroxine (SYNTHROID) 150 MCG tablet Take 150 mcg by mouth once daily.      quetiapine (SEROQUEL) 25  MG Tab Take 25 mg by mouth once daily.      acetaminophen (TYLENOL) 500 mg Cap       DENOSUMAB (PROLIA SUBQ) Inject into the skin.         STOP taking these medications       cholecalciferol, vitamin D3, (VITAMIN D3) 2,000 unit Cap Comments:   Reason for Stopping:             Time spent on the discharge of patient: <30 minutes    Rene Smith MD  Department of Hospital Medicine  [facility]

## 2017-03-09 NOTE — PLAN OF CARE
D/C PLAN:  I spoke with Radha at [facility], and she states they submitted for auth from [facility] this am and are awaiting approval.  I called and left  for Charissa at [facility] [phone], opt [phone], [phone], to see if auth was requested by [facility] and to see if she could expedite so we could transfer to SNF today. Await call back from her.

## 2017-03-09 NOTE — ASSESSMENT & PLAN NOTE
- followed by [facility]   - Improved after IVF, 10.1 today  - Sensipar increased to 120mg and holding calcitriol  - Vitamin D restarted at 800 U daily    - Due to receive prolia next month

## 2017-03-09 NOTE — PLAN OF CARE
I spoke with Radha at [facility]. [employer] approved SNF. They are ready to accept the patient. Nurse i[employer] notified to call report to [phone]. If no answer call Siri at [phone]. [facility] notified for pickup per Susanne FUENTES CM.

## 2017-03-09 NOTE — PLAN OF CARE
[facility]   Department of Hospital Medicine  [address] [place] [address]  [phone] (phone)  [phone] (fax)      Facility Transfer Orders                        03/09/2017    [facility]    Admit to: SNF    Diagnoses:  Active Hospital Problems    Diagnosis  POA    *Hypercalcemia [E83.52]  Yes    Generalized weakness [R53.1]  Yes    Dementia [F03.90]  Yes     2012: Diagnosed with Alzheimer's Dementia.      Chronic kidney disease, stage IV (severe) [N18.4]  Yes     11/15/2012: BUN/crea 38/2.1. CrCl 22.  Right arm fistula.      Hyperparathyroidism [E21.3]  Yes     2010: Diagnosed.      Hypertension [I10]  Yes     2000: Diagnosed.        Resolved Hospital Problems    Diagnosis Date Resolved POA   No resolved problems to display.       Allergies:  Review of patient's allergies indicates:   Allergen Reactions    Penicillins      Doesn't remember it has been years       Vitals:     Every shift (Skilled Nursing patients)    Diet: RENAL     Activity:      - Up in a chair each morning as tolerated   - Ambulate with assistance to bathroom      Nursing Precautions:           - Fall precautions   - Decubitus precautions:            CONSULTS:      PT to evaluate and treat - six times a week     OT to evaluate and treat - six times a week      LABS:        Medications:       Medication List      START taking these medications          cinacalcet 60 MG Tab   Commonly known as:  SENSIPAR   Take 2 tablets (120 mg total) by mouth daily with breakfast.       megestrol 20 MG Tab   Commonly known as:  MEGACE   Take 2 tablets (40 mg total) by mouth once daily.         CHANGE how you take these medications          fluoxetine 20 MG capsule   Commonly known as:  PROZAC   Take 1 capsule (20 mg total) by mouth once daily.   What changed:  medication strength         CONTINUE taking these medications          acetaminophen 500 mg Cap   Commonly known as:  TYLENOL        amlodipine 2.5 MG tablet   Commonly known as:  NORVASC       aspirin 81 MG EC tablet   Commonly known as:  ECOTRIN   Take 1 tablet (81 mg total) by mouth once daily.       calcitRIOL 0.25 MCG Cap   Commonly known as:  ROCALTROL       cetirizine 5 MG tablet   Commonly known as:  ZYRTEC       donepezil 10 MG tablet   Commonly known as:  ARICEPT       levothyroxine 150 MCG tablet   Commonly known as:  SYNTHROID       PROLIA SUBQ       quetiapine 25 MG Tab   Commonly known as:  SEROQUEL         STOP taking these medications          VITAMIN D3 2,000 unit Cap   Generic drug:  cholecalciferol (vitamin D3)            Where to Get Your Medications      These medications were sent to [id number] - [place] [address] [facility]  [address] [address]     Phone:  [phone]     cinacalcet 60 MG Tab         Information about where to get these medications is not yet available     ! Ask your nurse or doctor about these medications     fluoxetine 20 MG capsule    megestrol 20 MG Tab                     _________________________________  03/09/2017

## 2017-03-09 NOTE — PROGRESS NOTES
"Nephrology  Progress Note    Admit Date: 3/5/2017   LOS: 0 days     SUBJECTIVE:     Follow-up For:  Hypercalcemia    Interval History:    Looks/feels better this am.  Discussed with family at bedside.  Awaiting SNF placement today at [facility].  Aniket wnl.  Denies CP/SOB.  Discussed with treatment team.     Review of Systems:  Constitutional: No fever or chills  Respiratory: No cough or shortness of breath  Cardiovascular: No chest pain or palpitations  Gastrointestinal: No nausea or vomiting  Neurological: No confusion or weakness    OBJECTIVE:     Vital Signs Range (Last 24H):  BP (!) 140/63 (BP Location: Left arm, Patient Position: Lying, BP Method: Automatic)  Pulse 89  Temp 98 °F (36.7 °C) (Oral)   Resp 16  Ht 5' 4" (1.626 m)  Wt 58.3 kg (128 lb 8.5 oz)  SpO2 95%  Breastfeeding? No  BMI 22.06 kg/m2    Temp:  [97.5 °F (36.4 °C)-98 °F (36.7 °C)]   Pulse:  []   Resp:  [16-18]   BP: (140-184)/(5-120)   SpO2:  [95 %-98 %]     I & O (Last 24H):    Intake/Output Summary (Last 24 hours) at 03/09/17 1019  Last data filed at 03/09/17 0900   Gross per 24 hour   Intake              840 ml   Output              700 ml   Net              140 ml       Physical Exam:  General appearance: elderly frail/weak.  [place]  Eyes: Conjunctivae/corneas clear. PERRL.  Lungs: Normal respiratory effort, clear to auscultation bilaterally   Heart: Regular rate and rhythm, S1, S2 normal, no murmur, rub or julio c.  Abdomen: Soft, non-tender non-distended; bowel sounds normal; no masses, no organomegaly  Extremities: No cyanosis or clubbing. No edema.   Skin: Skin color, texture, turgor normal. No rashes or lesions  Neurologic: No focal weakness but generalized weakness.  Right Arm AVF      Laboratory Data:  No results for input(s): WBC, RBC, HGB, HCT, PLT, MCV, MCH, MCHC in the last 24 hours.    BMP:   Recent Labs  Lab 03/06/17  0524  03/09/17  0421   GLU 84  < > 79     < > 140   K 4.2  < > 4.1     < > 106 "   CO2 25  < > 26   BUN 78*  < > 71*   CREATININE 3.0*  < > 3.2*   CALCIUM 11.8*  < > 9.5   MG 2.3  --   --    < > = values in this interval not displayed.  Lab Results   Component Value Date    .2 (H) 03/06/2017    CALCIUM 9.5 03/09/2017    CAION 1.27 03/09/2017    PHOS 3.8 02/06/2017             Medications:  Medication list was reviewed and changes noted under Assessment/Plan.    Diagnostic Results:        ASSESSMENT/PLAN:     1. 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 Sensipar increased to 120mg.  Pt also due to receive prolia next month which will also help. Ionized calcium wnl this am. The degree of hypercalcemia unlikely the cause of her weakness, especially since son reported that it was waxing and waning.  Weakness is improving with PT/OT.  Restarted low dose Vit D 800 IU/daily.  2. 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 Renal fxn has stabilized and HD is on hold for now. Follow trends. No need for RRT at this time.  Will defer to Dr. Arvizu.    3. HTN (I12.9): CCB/BB for now. No ACE/ARB.  Labile.   4. Worsening dementia (F03.90): Patient has had a rapid decline in her mental status despite Aricept.  5. Dispo: SNF due to weakness and pt living alone.       Cleared from renal for DC.  F/U with Dr. Arvizu 1-2 weeks.

## 2017-03-09 NOTE — PROGRESS NOTES
[facility] Hospital Medicine  Progress Note    Patient Name: Mary Singleton  MRN: [id number]  Patient Class: OP- Observation   Admission Date: 3/5/2017  Length of Stay: 0 days  Attending Physician: Rene Smith MD  Primary Care Provider: Susana Arvizu MD        Subjective:     Principal Problem:Hypercalcemia    HPI:  Ms. Mary Singleton is a 89 y.o. Female, with PMH of CKD IV (right arm AV fistula),  Primary hypothyroidism (s/p 1 parathyroidectomy  on Sensipar (recently increased to 120 mg 3 days PTA)/VitD/Calcitriol), parathyroidectomy, HTN, CAD, Alzheimer's Dementia, Osteoperosis , PVD (L CEA 2002), who presented to the ED with weakness x 1 day and difficulty ambulating, and was found to have hypercalcemia (11.9, Ionized 1.49). She normally walks with a walker, and performs most of her ADLs by herself, but required assistance eating and moving in bed. This was concerning to the family as the patient lives alone in her home.     Hospital Course:  The patient was admitted for treatment of her elevated calcium level (11.9). She was treated with IF fluids, and increased in Sensipar to 120 mg. Calcium levels dropped to 10.8, and this AM to 10.1. Ionized calcium reduced from 1.49 to 1.03. She was restarted on her Vitamin D 800 U daily. She is currently awaiting SNF placement.     Interval History: Patient is new to me. No new complaints this AM. Uneventful overnight.     Review of Systems   Constitutional: Negative for chills, fatigue and fever.   Respiratory: Negative for cough, shortness of breath and wheezing.    Cardiovascular: Negative for chest pain and palpitations.   Gastrointestinal: Negative for abdominal pain, constipation, diarrhea, nausea and vomiting.   Genitourinary: Negative for dysuria, frequency and hematuria.   Neurological: Positive for weakness. Negative for dizziness and headaches.     Objective:     Vital Signs (Most Recent):  Temp: 97.5 °F (36.4 °C) (03/08/17  1924)  Pulse: 93 (03/08/17 1924)  Resp: 18 (03/08/17 1924)  BP: (!) 143/66 (03/08/17 1924)  SpO2: 98 % (03/08/17 1924) Vital Signs (24h Range):  Temp:  [97.5 °F (36.4 °C)-98.3 °F (36.8 °C)] 97.5 °F (36.4 °C)  Pulse:  [] 93  Resp:  [16-18] 18  SpO2:  [96 %-98 %] 98 %  BP: (135-184)/(64-77) 143/66     Weight: 58.3 kg (128 lb 8.5 oz)  Body mass index is 22.06 kg/(m^2).    Intake/Output Summary (Last 24 hours) at 03/08/17 2339  Last data filed at 03/08/17 2200   Gross per 24 hour   Intake              780 ml   Output              550 ml   Net              230 ml      Physical Exam   Constitutional: She is oriented to person, place, and time. She appears well-developed and well-nourished. No distress.   Thin female.    HENT:   Head: Normocephalic and atraumatic.   Right Ear: External ear normal.   Left Ear: External ear normal.   Eyes: Conjunctivae and EOM are normal. Pupils are equal, round, and reactive to light. No scleral icterus.   Neck: Normal range of motion. Neck supple. No tracheal deviation present.   Cardiovascular: Normal rate, regular rhythm, normal heart sounds and intact distal pulses.  Exam reveals no gallop and no friction rub.    No murmur heard.  2+ distal radial/DT/PT pulses.    Pulmonary/Chest: Effort normal and breath sounds normal. No stridor. No respiratory distress. She has no wheezes. She has no rales. She exhibits no tenderness.   Bilateral lung fields CTA.     Abdominal: Soft. Bowel sounds are normal. She exhibits no distension. There is no tenderness.   Musculoskeletal: Normal range of motion. She exhibits no deformity.   Neurological: She is alert and oriented to person, place, and time. No cranial nerve deficit. She exhibits normal muscle tone.   Skin: Skin is warm and dry. No rash noted. She is not diaphoretic. No erythema. No pallor.   Psychiatric: She has a normal mood and affect. Her behavior is normal. Judgment and thought content normal.   Nursing note and vitals  reviewed.      Significant Labs:   BMP:   Recent Labs  Lab 03/08/17  0444   GLU 69*      K 4.4      CO2 22*   BUN 73*   CREATININE 3.3*   CALCIUM 10.1     CBC: No results for input(s): WBC, HGB, HCT, PLT in the last 48 hours.  All pertinent labs within the past 24 hours have been reviewed.    Significant Imaging: I have reviewed all pertinent imaging results/findings within the past 24 hours.         Assessment/Plan:      * Hypercalcemia  - followed by [facility]   - Improved after IVF, 10.1 today  - Sensipar increased to 120mg and holding calcitriol  - Vitamin D restarted at 800 U daily    - Due to receive prolia next month     Hypertension  - Resume home medication         Hyperparathyroidism  - TSH = 0.643  - continue Synthroid     Chronic kidney disease, stage IV (severe)  - Creatinine stable   - HD on hold for now        Dementia  - Continue Donepezil   - stable    Generalized weakness  - evaluated by PT/OT - patient needs 6x/week PT/OT upon discharge   - weakness improving      VTE Risk Mitigation         Ordered     heparin (porcine) injection 5,000 Units  Every 8 hours     Route:  Subcutaneous        03/07/17 1611          HANH PedersenC  Department of Hospital Medicine   [facility]

## 2017-03-09 NOTE — DISCHARGE SUMMARY
[facility] Hospital Medicine  Discharge Summary      Patient Name: Mary Singleton  MRN: [id number]  Admission Date: 3/5/2017  Hospital Length of Stay: 0 days  Discharge Date and Time:  03/09/2017 11:56 AM  Attending Physician: Clara Gandara MD   Discharging Provider: Sheryl Galo PA-C  Primary Care Provider: Susana Arvizu MD      HPI:   Ms. Mary Singleton is a 89 y.o. Female, with PMH of CKD IV (right arm AV fistula),  Primary hypothyroidism (s/p 1 parathyroidectomy  on Sensipar (recently increased to 120 mg 3 days PTA)/VitD/Calcitriol), parathyroidectomy, HTN, CAD, Alzheimer's Dementia, Osteoperosis , PVD (L CEA 2002), who presented to the ED with weakness x 1 day and difficulty ambulating, and was found to have hypercalcemia (11.9, Ionized 1.49). She normally walks with a walker, and performs most of her ADLs by herself, but required assistance eating and moving in bed. This was concerning to the family as the patient lives alone in her home.     * No surgery found *      Indwelling Lines/Drains at time of discharge:   Lines/Drains/Airways     Drain                 Hemodialysis AV Fistula Right forearm -- days              Hospital Course:   The patient was admitted for treatment of her elevated calcium level (11.9). She was treated with IF fluids, and increased in Sensipar to 120 mg. Calcium levels dropped to 10.8, and this AM to 10.1. Ionized calcium reduced from 1.49 to 1.03. She was restarted on her Vitamin D 800 U daily. She is currently awaiting SNF placement.   On 03/09/2017 the patient was accepted to [facility].        Consults:   Consults         Status Ordering Provider     Inpatient consult to [facility]  Once     Provider:  (Not yet assigned)    Completed HAILEE SAPP     Inpatient consult to SNF  Once     Provider:  (Not yet assigned)    Acknowledged CLARA GANDARA          Significant Diagnostic Studies: Labs:   BMP:     Recent Labs  Lab 03/08/17  0444  03/09/17  0421   GLU 69* 79    140   K 4.4 4.1    106   CO2 22* 26   BUN 73* 71*   CREATININE 3.3* 3.2*   CALCIUM 10.1 9.5   , CMP     Recent Labs  Lab 03/08/17  0444 03/09/17  0421    140   K 4.4 4.1    106   CO2 22* 26   GLU 69* 79   BUN 73* 71*   CREATININE 3.3* 3.2*   CALCIUM 10.1 9.5   ANIONGAP 9 8   ESTGFRAFRICA 14* 14*   EGFRNONAA 12* 12*    and All labs within the past 24 hours have been reviewed    Pending Diagnostic Studies:     Procedure Component Value Units Date/Time    PTH-related peptide [[id number]] Collected:  03/06/17 0950    Order Status:  Sent Lab Status:  In process Updated:  03/06/17 1843    Specimen:  Blood from Blood         Final Active Diagnoses:    Diagnosis Date Noted POA    PRINCIPAL PROBLEM:  Hypercalcemia [E83.52] 03/06/2017 Yes    Generalized weakness [R53.1] 03/06/2017 Yes    Dementia [F03.90] 11/02/2016 Yes    Chronic kidney disease, stage IV (severe) [N18.4] 07/25/2013 Yes    Hyperparathyroidism [E21.3] 07/25/2013 Yes    Hypertension [I10] 07/25/2013 Yes      Problems Resolved During this Admission:    Diagnosis Date Noted Date Resolved POA      * Hypercalcemia  - followed by [facility]   - Improved after IVF, 9.5 today  - Sensipar increased to 120mg and holding calcitriol  - Vitamin D restarted at 800 U daily    - Due to receive prolia next month     Hypertension  - Resume home medication:Amlodipine 2.5 mg QD            Hyperparathyroidism  - TSH = 0.643  - continue Synthroid 150 mcg QD   - Follow up with PCP     Chronic kidney disease, stage IV (severe)  - Creatinine stable   - HD on hold for now        Dementia  - Continue Donepezil   - stable    Generalized weakness  - evaluated by PT/OT - patient needs 6x/week PT/OT upon discharge   - weakness improving        Discharged Condition: stable    Disposition: Home or Self Care    Follow Up:  Follow-up Information     Follow up with Susana Arvizu MD. Schedule an appointment as soon as possible for  a visit in 1 week.    Specialty:  Nephrology    Contact information:    [address] [address]  [phone]          Follow up with Meseret Briones.    Specialties:  Nursing Home Agency, SNF Agency    Why:  SNF    Contact information:    [address] [facility] [address]  [phone]          Patient Instructions:     Diet general     Activity as tolerated     Call MD for:  temperature >100.4     Call MD for:  persistent nausea and vomiting or diarrhea     Call MD for:  severe uncontrolled pain     Call MD for:  redness, tenderness, or signs of infection (pain, swelling, redness, odor or green/yellow discharge around incision site)     Call MD for:  difficulty breathing or increased cough     Call MD for:  severe persistent headache     Call MD for:  worsening rash     Call MD for:  persistent dizziness, light-headedness, or visual disturbances     Call MD for:  increased confusion or weakness       Medications:  Reconciled Home Medications:   Current Discharge Medication List      START taking these medications    Details   cinacalcet (SENSIPAR) 60 MG Tab Take 2 tablets (120 mg total) by mouth daily with breakfast.  Qty: 60 tablet, Refills: 0         CONTINUE these medications which have NOT CHANGED    Details   amlodipine (NORVASC) 2.5 MG tablet Take 2.5 mg by mouth once daily.      aspirin (ECOTRIN) 81 MG EC tablet Take 1 tablet (81 mg total) by mouth once daily.  Qty: 90 tablet, Refills: 3    Associated Diagnoses: Bilateral carotid artery stenosis      calcitRIOL (ROCALTROL) 0.25 MCG Cap Take 0.25 mcg by mouth once daily.       cetirizine (ZYRTEC) 5 MG tablet Take 5 mg by mouth once daily.      donepezil (ARICEPT) 10 MG tablet Take 10 mg by mouth once daily.       fluoxetine (PROZAC) 10 MG capsule Take 20 mg by mouth once daily.       levothyroxine (SYNTHROID) 150 MCG tablet Take 150 mcg by mouth once daily.      quetiapine (SEROQUEL) 25 MG Tab Take 25 mg by mouth once  daily.      acetaminophen (TYLENOL) 500 mg Cap       DENOSUMAB (PROLIA SUBQ) Inject into the skin.         STOP taking these medications       cholecalciferol, vitamin D3, (VITAMIN D3) 2,000 unit Cap Comments:   Reason for Stopping:             Time spent on the discharge of patient: <30 minutes    Sheryl Galo PA-C  Department of Hospital Medicine  [facility]

## 2017-03-09 NOTE — PLAN OF CARE
Problem: Patient Care Overview  Goal: Plan of Care Review  Outcome: Ongoing (interventions implemented as appropriate)  No injuries. Plan of care reviewed with patient and family. Verbalized understanding. Purposeful rounding performed. Vitals stable. Will continue to monitor.

## 2017-03-09 NOTE — PT/OT/SLP PROGRESS
Occupational Therapy  Treatment    Mary Singleton   MRN: [id number]   Admitting Diagnosis: Hypercalcemia    OT Date of Treatment: 17   OT Start Time: 1110  OT Stop Time: 1148  OT Total Time (min): 38 min    Billable Minutes:  Self Care/Home Management 30, Therapeutic Activity 8 and Total Time 38    General Precautions: Standard, fall, anti-coagulation medicine, hearing impaired  Orthopedic Precautions: N/A  Braces: N/A    Do you have any cultural, spiritual, Yarsanism conflicts, given your current situation?: None specified    Subjective:  Communicated with nurse prior to session.      Pain Ratin/10              Pain Rating Post-Intervention: 0/10    Objective:        Functional Mobility:  Bed Mobility:       Transfers:   Sit <> Stand Assistance: Contact Guard Assistance  Sit <> Stand Assistive Device: Rolling Walker  Bed <> Chair Technique: Stand Pivot  Bed <> Chair Transfer Assistance: Contact Guard Assistance  Bed <> Chair Assistive Device: Rolling Walker  Toilet Transfer Technique: Stand Pivot  Toilet Transfer Assistance: Contact Guard Assistance  Toilet Transfer Assistive Device: bedside commode, Rolling Walker (BSc over toilet)    Functional Ambulation: CGA with RW from bs chair>bathroom>sink>back to bedside chair.    Activities of Daily Living:       UE Dressing Level of Assistance: Maximum assistance (max assist oullover tech with button front shirt)    LE Dressing Level of Assistance: Moderate assistance (to thread and pull over buttocks for pants and pullup briefs)    Grooming Position: Standing at sink, Seated, bedside chair  Grooming Level of Assistance: Minimum assistance (brushed teeth and washed hands standing at sink CVGA-SBA; sat for  washing face with Sup and combing hair  with min assist)  Toileting Where Assessed: Bedside commode (BSc frame over toilet with RW)  Toileting Level of Assistance: Minimum assistance  Bathing Where Assessed: Other (Comment) (bs chair)  Bathing Level of  Assistance: Moderate assistance ( sponge bath BLE, back and CGA stabnding for periarea an d buttocks )      Balance:   Static Sit: FAIR+: Able to take MINIMAL challenges from all directions  Dynamic Sit: FAIR+: Maintains balance through MINIMAL excursions of active trunk motion  Static Stand: FAIR+: Takes MINIMAL challenges from all directions  Dynamic stand: FAIR: Needs CONTACT GUARD during gait    Therapeutic Activities and Exercises:  Grooming standing at sink with SBA-CGA for hand hygiene and to brush teeth; sat to wash face with SUP and comb hair with min assist.  Toileting min assist for clothes management with RW; ambulated with RW and CGA from BS chair>sink>Bs chair. Toilet t/f with RW and 3n1 frame over toilet with CGA.  Sponge bath mod assist; UB dressing max assist with pullover tech; LB dressing mod assist to thread legs into pants and briefs and cga to pull over hips in stance.  Possible discharge today to SNF.  Continue with OT POC.    AM-PAC 6 CLICK ADL   How much help from another person does this patient currently need?   1 = Unable, Total/Dependent Assistance  2 = A lot, Maximum/Moderate Assistance  3 = A little, Minimum/Contact Guard/Supervision  4 = None, Modified [place]/Independent    Putting on and taking off regular lower body clothing? : 2  Bathing (including washing, rinsing, drying)?: 2  Toileting, which includes using toilet, bedpan, or urinal? : 3  Putting on and taking off regular upper body clothing?: 2  Taking care of personal grooming such as brushing teeth?: 3  Eating meals?: 3  Total Score: 15     AM-PAC Raw Score CMS G-Code Modifier Level of Impairment Assistance   6 % Total / Unable   7 - 9 CM 80 - 100% Maximal Assist   10 - 14 CL 60 - 80% Moderate Assist   15 - 19 CK 40 - 60% Moderate Assist   20 - 22 CJ 20 - 40% Minimal Assist   23 CI 1-20% SBA / CGA   24 CH 0% Independent/ Mod I     Patient left up in chair with all lines intact, call button in reach and nurse  notified    ASSESSMENT:  Mary Singleton is a 89 y.o. female with a medical diagnosis of Hypercalcemia and presents with 2 OT goals met today.  Progressing in standing balance and ADLS. Possible discharge today to SNF.  Continue with OT POC.    Rehab identified problem list/impairments: Rehab identified problem list/impairments: weakness, impaired self care skills, impaired balance, decreased coordination, decreased safety awareness, impaired endurance, impaired functional mobilty, gait instability, impaired cognition, impaired coordination, decreased ROM    Rehab potential is good.    Activity tolerance: Good    Discharge recommendations: Discharge Facility/Level Of Care Needs: nursing facility, skilled     Barriers to discharge: Barriers to Discharge: Decreased caregiver support    Equipment recommendations: bath bench, walker, rolling (transport wc)     GOALS:   Occupational Therapy Goals        Problem: Occupational Therapy Goal    Goal Priority Disciplines Outcome Interventions   Occupational Therapy Goal     OT, PT/OT Ongoing (interventions implemented as appropriate)    Description:  Goals to be met by 4/10/2017    1. Feeding with Minimum Assistance.  2. Sitting balance with SBA with verbal prompts for 15 minutes.   3. Assess transfer to bedside commode. ---Goal met 3/7/2017  NEW GOAL:  BSC stand pivot tf/ with CGA.---Gaol met 3/9/2017  4. Assess UBD.----Goal met 3/7/2017  NEW GOAL:  UBD Min  Assist supported sitting.   5. CGA with RW for grooming at sink (NEW GOAL 03/08/17)  6. Min assist toilet hygiene and clothing mgmt (NEW GOAL 03/08/17) ---Goal met 3/9/2017              Plan:  Patient to be seen 6 x/week to address the above listed problems via self-care/home management, therapeutic activities, therapeutic exercises  Plan of Care expires: 04/10/17  Plan of Care reviewed with: patient, daughter         Diamante Sales, OT  03/09/2017

## 2017-03-09 NOTE — PLAN OF CARE
[facility]   Department of Hospital Medicine  [address] [place] [address]  [phone] (phone)  [phone] (fax)      Facility Transfer Orders                        03/09/2017    Mary Arrietafrida    Admit to: SNF    Diagnoses:  Active Hospital Problems    Diagnosis  POA    *Hypercalcemia [E83.52]  Yes    Generalized weakness [R53.1]  Yes    Dementia [F03.90]  Yes     2012: Diagnosed with Alzheimer's Dementia.      Chronic kidney disease, stage IV (severe) [N18.4]  Yes     11/15/2012: BUN/crea 38/2.1. CrCl 22.  Right arm fistula.      Hyperparathyroidism [E21.3]  Yes     2010: Diagnosed.      Hypertension [I10]  Yes     2000: Diagnosed.        Resolved Hospital Problems    Diagnosis Date Resolved POA   No resolved problems to display.       Allergies:  Review of patient's allergies indicates:   Allergen Reactions    Penicillins      Doesn't remember it has been years       Vitals:     Every shift (Skilled Nursing patients)    Diet: RENAL     Activity:      - Up in a chair each morning as tolerated   - Ambulate with assistance to bathroom      Nursing Precautions:           - Fall precautions   - Decubitus precautions:            CONSULTS:      PT to evaluate and treat - six times a week     OT to evaluate and treat - six times a week      LABS:        Medications:       Medication List      START taking these medications          cinacalcet 60 MG Tab   Commonly known as:  SENSIPAR   Take 2 tablets (120 mg total) by mouth daily with breakfast.         CONTINUE taking these medications          acetaminophen 500 mg Cap   Commonly known as:  TYLENOL       amlodipine 2.5 MG tablet   Commonly known as:  NORVASC       aspirin 81 MG EC tablet   Commonly known as:  ECOTRIN   Take 1 tablet (81 mg total) by mouth once daily.       calcitRIOL 0.25 MCG Cap   Commonly known as:  ROCALTROL       cetirizine 5 MG tablet   Commonly known as:  ZYRTEC       donepezil 10 MG  tablet   Commonly known as:  ARICEPT       fluoxetine 10 MG capsule   Commonly known as:  PROZAC       levothyroxine 150 MCG tablet   Commonly known as:  SYNTHROID       PROLIA SUBQ       quetiapine 25 MG Tab   Commonly known as:  SEROQUEL         STOP taking these medications          VITAMIN D3 2,000 unit Cap   Generic drug:  cholecalciferol (vitamin D3)            Where to Get Your Medications      These medications were sent to [id number] - [place] - [address] [facility]  [address] [address]     Phone:  [phone]     cinacalcet 60 MG Tab                     _________________________________  03/09/2017

## 2017-03-09 NOTE — PT/OT/SLP PROGRESS
"Physical Therapy  Treatment    Mary Singleton   MRN: [id number]   Admitting Diagnosis: Hypercalcemia    PT Received On: 03/09/17  PT Start Time: 0839     PT Stop Time: 0905    PT Total Time (min): 26 min       Billable Minutes:  Gait [id number], Therapeutic Activity 16    Treatment Type: Treatment  PT/PTA: PTA     PTA Visit Number: 3       General Precautions: Standard, anti-coagulation medicine  Orthopedic Precautions: N/A   Braces: N/A    Do you have any cultural, spiritual, Advent conflicts, given your current situation?: none specified    Subjective:  Communicated with nurse prior to session. Pt. Stated " I'm too weak I can't do it"     Pain Rating:  (never rated pain but complained of pain in LE's)     Objective:   Patient found with: telemetry supine in bed     Functional Mobility:  Bed Mobility: trunk support   Scooting/Bridging: Minimum Assistance  Supine to Sit: Minimum Assistance    Transfers: sit to stand from bed, from bedside commode  Verbal cues for hand placement; posterior lean initially   Sit <> Stand Assistance: Minimum Assistance  Sit <> Stand Assistive Device: Rolling Walker    Gait: pt. Demo unsteady gait and required encouragement to participate.   Gait Distance: 75 feet   Assistance 1: Minimum assistance  Gait Assistive Device: Rolling walker  Gait Pattern: reciprocal  Gait Deviation(s): decreased bryan, decreased step length, decreased weight-shifting ability    Therapeutic Activities and Exercises:  Pt. Performed AP, LAQ, Hip Flexion X 15 reps B LE   Patient had bowel movement while seated on bedside commode required total A for cleaning  Static stood with RW with CGA for balance    AM-PAC 6 CLICK MOBILITY  How much help from another person does this patient currently need?   1 = Unable, Total/Dependent Assistance  2 = A lot, Maximum/Moderate Assistance  3 = A little, Minimum/Contact Guard/Supervision  4 = None, Modified [place]/Independent    Turning over in bed (including " adjusting bedclothes, sheets and blankets)?: 3  Sitting down on and standing up from a chair with arms (e.g., wheelchair, bedside commode, etc.): 3  Moving from lying on back to sitting on the side of the bed?: 3  Moving to and from a bed to a chair (including a wheelchair)?: 3  Need to walk in hospital room?: 3  Climbing 3-5 steps with a railing?: 3  Total Score: 18    AM-PAC Raw Score CMS G-Code Modifier Level of Impairment Assistance   6 % Total / Unable   7 - 9 CM 80 - 100% Maximal Assist   10 - 14 CL 60 - 80% Moderate Assist   15 - 19 CK 40 - 60% Moderate Assist   20 - 22 CJ 20 - 40% Minimal Assist   23 CI 1-20% SBA / CGA   24 CH 0% Independent/ Mod I     Patient left up in chair with all lines intact, call button in reach and nurse notified.    Assessment:  Mary Singleton is a 89 y.o. female with a medical diagnosis of Hypercalcemia patient with increase weakness on this day and required encouragement to progress. Patient will cont. To benefit from skilled PT services to improve strength, endurance and functional mobility.     Rehab identified problem list/impairments: Rehab identified problem list/impairments: weakness, impaired endurance, gait instability, impaired functional mobilty, impaired self care skills, impaired balance, visual deficits, decreased safety awareness, impaired cognition    Rehab potential is fair.    Activity tolerance: Fair    Discharge recommendations: Discharge Facility/Level Of Care Needs: nursing facility, skilled  Will need 24/7 assistance upon discharge     Barriers to discharge: Barriers to Discharge: Decreased caregiver support    Equipment recommendations: Equipment Needed After Discharge: walker, rolling (transfer tub bench, transport chair)     GOALS:   Physical Therapy Goals        Problem: Physical Therapy Goal    Goal Priority Disciplines Outcome Goal Variances Interventions   Physical Therapy Goal     PT/OT, PT Ongoing (interventions implemented as  appropriate)     Description:  Goals to be met by: 17     Patient will increase functional independence with mobility by performin. Supine to sit with Contact Guard Assistance MET 3/8/17  2. Sit to supine with Contact Guard Assistance  3. Sit to stand transfer with Contact Guard Assistance MET 3/8/17  4. Gait  x 50 feet with Minimal Assistance using Rolling Walker.  MET 3/8/17                 PLAN:    Patient to be seen 6 x/week  to address the above listed problems via gait training, therapeutic activities, therapeutic exercises, neuromuscular re-education  Plan of Care expires: 17  Plan of Care reviewed with: patient, daughter         Telma Bell, PTA  2017

## 2017-03-10 NOTE — PROGRESS NOTES
Pt and pt's family still waiting on [employer] transportion.  Pt and pt's family appear agitated and are growing impatient. Pt states she is ready to go and would like to leave immediately.  [facility] called. Spoke with  she states that the  just dropped someone off in [place] and is on his way here. Family notified.

## 2017-03-10 NOTE — PROGRESS NOTES
Physical Therapy Discharge Summary    Mary Singleton  MRN: [id number]   Hypercalcemia   Patient Discharged from acute Physical Therapy on 3-9-17.  Please refer to prior PT noted date on 3-9-17 for functional status.     Assessment:   Patient appropriate for care in another setting.  GOALS:   Physical Therapy Goals        Problem: Physical Therapy Goal    Goal Priority Disciplines Outcome Goal Variances Interventions   Physical Therapy Goal     PT/OT, PT Ongoing (interventions implemented as appropriate)     Description:  Goals to be met by: 17     Patient will increase functional independence with mobility by performin. Supine to sit with Contact Guard Assistance MET 3/8/17  2. Sit to supine with Contact Guard Assistance  3. Sit to stand transfer with Contact Guard Assistance MET 3/8/17  4. Gait  x 50 feet with Minimal Assistance using Rolling Walker.  MET 3/8/17               Reasons for Discontinuation of Therapy Services  Transfer to alternate level of care.      Plan:  Patient Discharged to: Skilled Nursing Facility.

## 2017-03-10 NOTE — PROGRESS NOTES
Called [employer].  stated that the  should be here around 1840. Will notify pt and pt's family.

## 2017-03-13 ENCOUNTER — PATIENT OUTREACH (OUTPATIENT)
Dept: ADMINISTRATIVE | Facility: CLINIC | Age: 82
End: 2017-03-13
Payer: MEDICARE

## 2017-04-03 ENCOUNTER — PATIENT OUTREACH (OUTPATIENT)
Dept: ADMINISTRATIVE | Facility: CLINIC | Age: 82
End: 2017-04-03
Payer: MEDICARE

## 2017-04-03 RX ORDER — AMLODIPINE BESYLATE 10 MG/1
10 TABLET ORAL DAILY
COMMUNITY

## 2017-04-04 ENCOUNTER — PATIENT OUTREACH (OUTPATIENT)
Dept: ADMINISTRATIVE | Facility: CLINIC | Age: 82
End: 2017-04-04
Payer: MEDICARE

## 2017-04-04 NOTE — PATIENT INSTRUCTIONS
Discharge Instructions for Hypercalcemia  You have been diagnosed with hypercalcemia. That means you havetoo much calcium in your blood. Calcium is a mineral that helps develop bones and teeth, controls heart rhythm, and allows muscles to contract. Hypercalcemia is often the result of problems elsewhere in the body, including overactive glands, unhealthy bones, long-term bed rest, and certain tumors or cancers.  Home care  · Ask your healthcare provider how much fluid you should drink. Most people with hypercalcemia need to drink from 3 quarts to 1 gallon (3 to 4 liters) of fluid every day, or as directed by your healthcare provider.  · Keep track of how much fluid you drink.  ¨ Fill a rinsed gallon milk jug with water or buy a gallon of water and keep it in your refrigerator.  ¨ Try to drink the entire jug of water during the course of the day, or as directed by your healthcare provider.  · Cut back on foods high in calcium.   ¨ Greatly limit or stop your intake of milk, cheese, cottage cheese, yogurt, pudding, and ice cream.  ¨ Read food labels. Dont buy dairy products with added calcium.  ¨ Calcium-fortified orange juice  ¨ Calcium-fortified ready-to-eat cereals  ¨ Canned salmon or sardines with soft bones  · Avoid antacid medicines if they list calcium as an ingredient. Many antacids contain calcium. Some contain magnesium and no calcium.  · Dont limit your salt intake.  · Exercise. If your hypercalcemia was caused by long-term bed rest, try to increase your activity if possible.  · Resume your normal activities as directed by your healthcare provider.  · Take your medicines exactly as directed.  · Tell your healthcare provider about any other medicines you are taking, including over-the-counter or herbal medicines and supplements.  · Keep all appointments for lab work and follow-up. Your healthcare provider needs to monitor your condition closely.  Follow-up  Make a follow-up appointment as directed by our  staff.  When to call your healthcare provider  Call your healthcare provider right away if you have any of the following:  · Extreme fatigue  · Loss of appetite  · Trouble urinating or pain when urinating  · Blood in your urine  · Vomiting or diarrhea  · Increased thirst  · Irregular heartbeat  · Dizziness or lightheadedness  · Depression  · Confusion      Date Last Reviewed: 6/19/2015 © 2000-2016 [employer]. [address] [place] [address], [place] [address]. All rights reserved. This information is not intended as a substitute for professional medical care. Always follow your healthcare professional's instructions.

## 2017-04-18 ENCOUNTER — HOSPITAL ENCOUNTER (INPATIENT)
Facility: OTHER | Age: 82
LOS: 7 days | Discharge: NURSING, REHAB OR LONG-TERM CARE | DRG: 643 | End: 2017-04-25
Attending: EMERGENCY MEDICINE | Admitting: HOSPITALIST
Payer: MEDICARE

## 2017-04-18 DIAGNOSIS — R00.1 BRADYCARDIA: ICD-10-CM

## 2017-04-18 DIAGNOSIS — I48.91 A-FIB: ICD-10-CM

## 2017-04-18 DIAGNOSIS — I44.1 MOBITZ TYPE 2 SECOND DEGREE ATRIOVENTRICULAR BLOCK: ICD-10-CM

## 2017-04-18 DIAGNOSIS — E83.52 HYPERCALCEMIA: Primary | ICD-10-CM

## 2017-04-18 DIAGNOSIS — N28.9 ACUTE RENAL INSUFFICIENCY: ICD-10-CM

## 2017-04-18 DIAGNOSIS — R41.82 ALTERED MENTAL STATUS, UNSPECIFIED ALTERED MENTAL STATUS TYPE: ICD-10-CM

## 2017-04-18 DIAGNOSIS — I10 HYPERTENSION: ICD-10-CM

## 2017-04-18 PROBLEM — N17.9 AKI (ACUTE KIDNEY INJURY): Status: ACTIVE | Noted: 2017-04-18

## 2017-04-18 PROBLEM — E87.0 HYPERNATREMIA: Status: ACTIVE | Noted: 2017-04-18

## 2017-04-18 LAB
ALBUMIN SERPL BCP-MCNC: 3.8 G/DL
ALP SERPL-CCNC: 63 U/L
ALT SERPL W/O P-5'-P-CCNC: 14 U/L
ANION GAP SERPL CALC-SCNC: 12 MMOL/L
AST SERPL-CCNC: 25 U/L
BASOPHILS # BLD AUTO: 0.01 K/UL
BASOPHILS NFR BLD: 0.1 %
BILIRUB SERPL-MCNC: 0.4 MG/DL
BILIRUB UR QL STRIP: NEGATIVE
BUN SERPL-MCNC: 85 MG/DL
CALCIUM SERPL-MCNC: 15.5 MG/DL
CHLORIDE SERPL-SCNC: 105 MMOL/L
CLARITY UR: CLEAR
CO2 SERPL-SCNC: 32 MMOL/L
COLOR UR: YELLOW
CREAT SERPL-MCNC: 3.9 MG/DL
DIFFERENTIAL METHOD: ABNORMAL
EOSINOPHIL # BLD AUTO: 0 K/UL
EOSINOPHIL NFR BLD: 0.5 %
ERYTHROCYTE [DISTWIDTH] IN BLOOD BY AUTOMATED COUNT: 12.6 %
EST. GFR  (AFRICAN AMERICAN): 11 ML/MIN/1.73 M^2
EST. GFR  (NON AFRICAN AMERICAN): 10 ML/MIN/1.73 M^2
GLUCOSE SERPL-MCNC: 98 MG/DL
GLUCOSE UR QL STRIP: NEGATIVE
HCT VFR BLD AUTO: 37.4 %
HGB BLD-MCNC: 12.1 G/DL
HGB UR QL STRIP: ABNORMAL
KETONES UR QL STRIP: NEGATIVE
LEUKOCYTE ESTERASE UR QL STRIP: NEGATIVE
LYMPHOCYTES # BLD AUTO: 1.4 K/UL
LYMPHOCYTES NFR BLD: 18.5 %
MAGNESIUM SERPL-MCNC: 2.4 MG/DL
MCH RBC QN AUTO: 31.1 PG
MCHC RBC AUTO-ENTMCNC: 32.4 %
MCV RBC AUTO: 96 FL
MONOCYTES # BLD AUTO: 0.5 K/UL
MONOCYTES NFR BLD: 6.3 %
NEUTROPHILS # BLD AUTO: 5.7 K/UL
NEUTROPHILS NFR BLD: 74.5 %
NITRITE UR QL STRIP: NEGATIVE
PH UR STRIP: 6 [PH] (ref 5–8)
PHOSPHATE SERPL-MCNC: 4.4 MG/DL
PLATELET # BLD AUTO: 254 K/UL
PMV BLD AUTO: 11.3 FL
POTASSIUM SERPL-SCNC: 3.5 MMOL/L
PROT SERPL-MCNC: 7 G/DL
PROT UR QL STRIP: ABNORMAL
PTH-INTACT SERPL-MCNC: 284.1 PG/ML
RBC # BLD AUTO: 3.89 M/UL
SODIUM SERPL-SCNC: 149 MMOL/L
SP GR UR STRIP: 1.02 (ref 1–1.03)
T4 FREE SERPL-MCNC: 1.33 NG/DL
TSH SERPL DL<=0.005 MIU/L-ACNC: 0.48 UIU/ML
URN SPEC COLLECT METH UR: ABNORMAL
UROBILINOGEN UR STRIP-ACNC: NEGATIVE EU/DL
WBC # BLD AUTO: 7.62 K/UL

## 2017-04-18 PROCEDURE — 96361 HYDRATE IV INFUSION ADD-ON: CPT

## 2017-04-18 PROCEDURE — 99223 1ST HOSP IP/OBS HIGH 75: CPT | Mod: ,,, | Performed by: PHYSICIAN ASSISTANT

## 2017-04-18 PROCEDURE — 84443 ASSAY THYROID STIM HORMONE: CPT

## 2017-04-18 PROCEDURE — 85025 COMPLETE CBC W/AUTO DIFF WBC: CPT

## 2017-04-18 PROCEDURE — 84100 ASSAY OF PHOSPHORUS: CPT

## 2017-04-18 PROCEDURE — 25000003 PHARM REV CODE 250: Performed by: PHYSICIAN ASSISTANT

## 2017-04-18 PROCEDURE — 93010 ELECTROCARDIOGRAM REPORT: CPT | Mod: ,,, | Performed by: INTERNAL MEDICINE

## 2017-04-18 PROCEDURE — 81003 URINALYSIS AUTO W/O SCOPE: CPT

## 2017-04-18 PROCEDURE — 93005 ELECTROCARDIOGRAM TRACING: CPT

## 2017-04-18 PROCEDURE — 99284 EMERGENCY DEPT VISIT MOD MDM: CPT | Mod: 25

## 2017-04-18 PROCEDURE — 25000003 PHARM REV CODE 250: Performed by: INTERNAL MEDICINE

## 2017-04-18 PROCEDURE — 11000001 HC ACUTE MED/SURG PRIVATE ROOM

## 2017-04-18 PROCEDURE — 96360 HYDRATION IV INFUSION INIT: CPT

## 2017-04-18 PROCEDURE — 84439 ASSAY OF FREE THYROXINE: CPT

## 2017-04-18 PROCEDURE — 80053 COMPREHEN METABOLIC PANEL: CPT

## 2017-04-18 PROCEDURE — 83970 ASSAY OF PARATHORMONE: CPT

## 2017-04-18 PROCEDURE — 83735 ASSAY OF MAGNESIUM: CPT

## 2017-04-18 PROCEDURE — 25000003 PHARM REV CODE 250: Performed by: EMERGENCY MEDICINE

## 2017-04-18 PROCEDURE — 36415 COLL VENOUS BLD VENIPUNCTURE: CPT

## 2017-04-18 PROCEDURE — 82306 VITAMIN D 25 HYDROXY: CPT

## 2017-04-18 RX ORDER — AMLODIPINE BESYLATE 5 MG/1
10 TABLET ORAL DAILY
Status: DISCONTINUED | OUTPATIENT
Start: 2017-04-18 | End: 2017-04-25 | Stop reason: HOSPADM

## 2017-04-18 RX ORDER — SODIUM CHLORIDE 450 MG/100ML
INJECTION, SOLUTION INTRAVENOUS CONTINUOUS
Status: DISCONTINUED | OUTPATIENT
Start: 2017-04-18 | End: 2017-04-19

## 2017-04-18 RX ORDER — CALCITRIOL 0.5 UG/1
0.5 CAPSULE ORAL DAILY
Status: ON HOLD | COMMUNITY
End: 2017-04-24 | Stop reason: HOSPADM

## 2017-04-18 RX ORDER — HEPARIN SODIUM 5000 [USP'U]/ML
5000 INJECTION, SOLUTION INTRAVENOUS; SUBCUTANEOUS EVERY 8 HOURS
Status: DISCONTINUED | OUTPATIENT
Start: 2017-04-18 | End: 2017-04-23

## 2017-04-18 RX ORDER — SODIUM CHLORIDE 9 MG/ML
1000 INJECTION, SOLUTION INTRAVENOUS
Status: COMPLETED | OUTPATIENT
Start: 2017-04-18 | End: 2017-04-18

## 2017-04-18 RX ORDER — DONEPEZIL HYDROCHLORIDE 5 MG/1
10 TABLET, FILM COATED ORAL DAILY
Status: DISCONTINUED | OUTPATIENT
Start: 2017-04-19 | End: 2017-04-19

## 2017-04-18 RX ORDER — MEGESTROL ACETATE 40 MG/1
40 TABLET ORAL DAILY
Status: ON HOLD | COMMUNITY
End: 2017-04-24 | Stop reason: HOSPADM

## 2017-04-18 RX ORDER — SODIUM CHLORIDE 9 MG/ML
INJECTION, SOLUTION INTRAVENOUS CONTINUOUS
Status: DISCONTINUED | OUTPATIENT
Start: 2017-04-18 | End: 2017-04-18

## 2017-04-18 RX ORDER — AMLODIPINE BESYLATE 5 MG/1
10 TABLET ORAL DAILY
Status: DISCONTINUED | OUTPATIENT
Start: 2017-04-19 | End: 2017-04-18

## 2017-04-18 RX ORDER — MEGESTROL ACETATE 40 MG/ML
400 SUSPENSION ORAL DAILY
Status: DISCONTINUED | OUTPATIENT
Start: 2017-04-18 | End: 2017-04-19

## 2017-04-18 RX ORDER — ACETAMINOPHEN 325 MG/1
650 TABLET ORAL EVERY 8 HOURS PRN
Status: DISCONTINUED | OUTPATIENT
Start: 2017-04-18 | End: 2017-04-25 | Stop reason: HOSPADM

## 2017-04-18 RX ORDER — ONDANSETRON 2 MG/ML
4 INJECTION INTRAMUSCULAR; INTRAVENOUS EVERY 8 HOURS PRN
Status: DISCONTINUED | OUTPATIENT
Start: 2017-04-18 | End: 2017-04-25 | Stop reason: HOSPADM

## 2017-04-18 RX ORDER — CINACALCET 30 MG/1
60 TABLET, FILM COATED ORAL 2 TIMES DAILY WITH MEALS
Status: DISCONTINUED | OUTPATIENT
Start: 2017-04-18 | End: 2017-04-25 | Stop reason: HOSPADM

## 2017-04-18 RX ORDER — ASPIRIN 81 MG/1
81 TABLET ORAL DAILY
Status: DISCONTINUED | OUTPATIENT
Start: 2017-04-19 | End: 2017-04-25 | Stop reason: HOSPADM

## 2017-04-18 RX ORDER — CINACALCET 30 MG/1
120 TABLET, FILM COATED ORAL
Status: DISCONTINUED | OUTPATIENT
Start: 2017-04-19 | End: 2017-04-18

## 2017-04-18 RX ORDER — AMOXICILLIN 250 MG
1 CAPSULE ORAL 2 TIMES DAILY PRN
Status: DISCONTINUED | OUTPATIENT
Start: 2017-04-18 | End: 2017-04-25 | Stop reason: HOSPADM

## 2017-04-18 RX ADMIN — SODIUM CHLORIDE: 0.9 INJECTION, SOLUTION INTRAVENOUS at 05:04

## 2017-04-18 RX ADMIN — CINACALCET HYDROCHLORIDE 60 MG: 30 TABLET, COATED ORAL at 09:04

## 2017-04-18 RX ADMIN — AMLODIPINE BESYLATE 10 MG: 5 TABLET ORAL at 09:04

## 2017-04-18 RX ADMIN — SODIUM CHLORIDE 500 ML: 0.9 INJECTION, SOLUTION INTRAVENOUS at 02:04

## 2017-04-18 RX ADMIN — MEGESTROL ACETATE 400 MG: 40 SUSPENSION ORAL at 09:04

## 2017-04-18 RX ADMIN — SODIUM CHLORIDE: 0.45 INJECTION, SOLUTION INTRAVENOUS at 09:04

## 2017-04-18 RX ADMIN — SODIUM CHLORIDE 1000 ML: 0.9 INJECTION, SOLUTION INTRAVENOUS at 04:04

## 2017-04-18 RX ADMIN — HEPARIN SODIUM 5000 UNITS: 5000 INJECTION, SOLUTION INTRAVENOUS; SUBCUTANEOUS at 09:04

## 2017-04-18 RX ADMIN — SODIUM CHLORIDE 500 ML: 0.9 INJECTION, SOLUTION INTRAVENOUS at 03:04

## 2017-04-18 NOTE — SUBJECTIVE & OBJECTIVE
Past Medical History:   Diagnosis Date    CKD (chronic kidney disease), stage IV     Dementia 11/2/2016    2012: Diagnosed with Alzheimer's Dementia.    HPTH (hyperparathyroidism)     Hypertension     Osteoporosis        Past Surgical History:   Procedure Laterality Date    parathyroid removal         Review of patient's allergies indicates:   Allergen Reactions    Penicillins      Doesn't remember it has been years       No current facility-administered medications on file prior to encounter.      Current Outpatient Prescriptions on File Prior to Encounter   Medication Sig    amlodipine (NORVASC) 10 MG tablet Take 10 mg by mouth once daily.    aspirin (ECOTRIN) 81 MG EC tablet Take 1 tablet (81 mg total) by mouth once daily.    cetirizine (ZYRTEC) 5 MG tablet Take 5 mg by mouth once daily.    cinacalcet (SENSIPAR) 60 MG Tab Take 2 tablets (120 mg total) by mouth daily with breakfast.    DENOSUMAB (PROLIA SUBQ) Inject into the skin every 6 (six) months.     donepezil (ARICEPT) 10 MG tablet Take 10 mg by mouth once daily.     fluoxetine (PROZAC) 20 MG capsule Take 1 capsule (20 mg total) by mouth once daily.    levothyroxine (SYNTHROID) 150 MCG tablet Take 150 mcg by mouth once daily.    quetiapine (SEROQUEL) 25 MG Tab Take 25 mg by mouth every evening.     [DISCONTINUED] acetaminophen (TYLENOL) 500 mg Cap     [DISCONTINUED] amlodipine (NORVASC) 2.5 MG tablet Take 2.5 mg by mouth once daily.    [DISCONTINUED] calcitRIOL (ROCALTROL) 0.25 MCG Cap Take 0.25 mcg by mouth once daily.     [DISCONTINUED] megestrol (MEGACE) 20 MG Tab Take 2 tablets (40 mg total) by mouth once daily.     Family History     Problem Relation (Age of Onset)    Cancer Sister    Hearing loss Sister    Hypertension Father        Social History Main Topics    Smoking status: Never Smoker    Smokeless tobacco: Never Used    Alcohol use No    Drug use: No    Sexual activity: No     Review of Systems   Unable to perform ROS:  Dementia     Objective:     Vital Signs (Most Recent):  Temp: 97.5 °F (36.4 °C) (04/18/17 1729)  Pulse: 86 (04/18/17 1729)  Resp: 20 (04/18/17 1729)  BP: (!) 197/78 (04/18/17 1729)  SpO2: 98 % (04/18/17 1729) Vital Signs (24h Range):  Temp:  [97.1 °F (36.2 °C)-97.5 °F (36.4 °C)] 97.5 °F (36.4 °C)  Pulse:  [76-86] 86  Resp:  [14-30] 20  SpO2:  [96 %-100 %] 98 %  BP: (165-197)/(72-78) 197/78     Weight: 56.7 kg (125 lb)  Body mass index is 21.46 kg/(m^2).    Physical Exam   Constitutional: She appears well-developed and well-nourished. No distress.   Elderly female in no distress   HENT:   Head: Normocephalic and atraumatic.   Mouth/Throat: No oropharyngeal exudate.   Dry mucous membranes   Eyes: Conjunctivae are normal. No scleral icterus.   Neck: Normal range of motion. Neck supple. No JVD present.   Cardiovascular: Normal rate, regular rhythm, normal heart sounds and intact distal pulses.    Pulmonary/Chest: Effort normal and breath sounds normal. No respiratory distress. She has no wheezes.   Abdominal: Soft. Bowel sounds are normal. She exhibits no distension. There is no tenderness.   Musculoskeletal: She exhibits no edema or tenderness.   Neurological:   Arousable, alert to self   Skin: Skin is warm and dry.   RUE fistula, + thrill   Nursing note and vitals reviewed.       Significant Labs:   CBC:   Recent Labs  Lab 04/18/17  1416   WBC 7.62   HGB 12.1   HCT 37.4        CMP:   Recent Labs  Lab 04/18/17  1416   *   K 3.5      CO2 32*   GLU 98   BUN 85*   CREATININE 3.9*   CALCIUM 15.5*   PROT 7.0   ALBUMIN 3.8   BILITOT 0.4   ALKPHOS 63   AST 25   ALT 14   ANIONGAP 12   EGFRNONAA 10*         Significant Imaging:   Imaging Results     None

## 2017-04-18 NOTE — PROGRESS NOTES
"JEFF Fraire, on floor, states, "keep NPO over night. In the morning, if she passes the bedside dysphasia screen, okay to start regular diet at that time." Primary RN, James, notified.   "

## 2017-04-18 NOTE — ED PROVIDER NOTES
Encounter Date: 4/18/2017    SCRIBE #1 NOTE: I, Raul Estrada, am scribing for, and in the presence of,  Dr. Ham. I have scribed the entire note.       History     Chief Complaint   Patient presents with    Hypercalcemia     sent by [employer] for hypercalcemia w/ lab work drawn yesterday, hx dementia     Review of patient's allergies indicates:   Allergen Reactions    Penicillins      Doesn't remember it has been years     HPI Comments: Time seen by provider: 2:18 PM    This is a 89 y.o. Female with HTN, dementia and CKD who presents with complaint of hypercalcemia. She was sent by home health after her calcium levels from her blood work done yesterday was too high. She has a Hx of similar hypercalcemia and gets drowsy and fatigued each time. She also experiences increased weakness, confusion, decreased appetite and tremors. Her daughter denies that she has had any fever, CP, SOB, nausea and vomiting. She has not been started on hemodialysis but does follow up with her nephrologist. She has a PSHx of parathyroid removal.     The history is provided by the patient and a relative.     Past Medical History:   Diagnosis Date    CKD (chronic kidney disease), stage IV     Dementia 11/2/2016    2012: Diagnosed with Alzheimer's Dementia.    HPTH (hyperparathyroidism)     Hypertension     Osteoporosis      Past Surgical History:   Procedure Laterality Date    parathyroid removal       Family History   Problem Relation Age of Onset    Hypertension Father     Cancer Sister     Hearing loss Sister      Social History   Substance Use Topics    Smoking status: Never Smoker    Smokeless tobacco: Never Used    Alcohol use No     Review of Systems   Constitutional: Positive for appetite change and fatigue. Negative for chills, diaphoresis and fever.        Hypercalcemia.    HENT: Negative for sore throat.    Eyes: Negative for pain.   Respiratory: Negative for shortness of breath.    Cardiovascular: Negative for  chest pain.   Gastrointestinal: Negative for diarrhea, nausea and vomiting.   Genitourinary: Negative for dysuria and frequency.   Musculoskeletal: Negative for myalgias.   Skin: Negative for color change.   Neurological: Positive for weakness. Negative for headaches.   Psychiatric/Behavioral: Positive for confusion.       Physical Exam   Initial Vitals   BP Pulse Resp Temp SpO2   04/18/17 1313 04/18/17 1313 04/18/17 1313 04/18/17 1313 04/18/17 1313   171/76 85 18 97.1 °F (36.2 °C) 96 %     Physical Exam    Nursing note and vitals reviewed.  Constitutional: She appears well-developed and well-nourished. She is not diaphoretic. No distress.   Elderly female.    HENT:   Head: Normocephalic and atraumatic.   Mouth/Throat: Oropharynx is clear and moist.   Dry mucous membranes.    Eyes: Conjunctivae and EOM are normal. Pupils are equal, round, and reactive to light. Right eye exhibits no discharge. Left eye exhibits no discharge.   No pallor or icterus.   Neck: Normal range of motion. Neck supple.   Cardiovascular: Normal rate, regular rhythm, normal heart sounds and intact distal pulses. Exam reveals no gallop and no friction rub.    No murmur heard.  Pulmonary/Chest: Breath sounds normal. No respiratory distress. She has no wheezes. She has no rhonchi. She has no rales.   Abdominal: Soft. Bowel sounds are normal. She exhibits no distension. There is no tenderness. There is no rebound and no guarding.   Musculoskeletal: Normal range of motion. She exhibits no edema or tenderness.   Lymphadenopathy:     She has no cervical adenopathy.   Neurological: She has normal strength. No sensory deficit.   Cannot comply with detailed neurologic exam but no focal deficits. Orientated to person.    Skin: Skin is warm and dry. No rash and no abscess noted. No erythema. No pallor.   AV shunt to RUE with palpable thrill.    Psychiatric: Judgment and thought content normal.   Patient is lethargic but arouses to voice and can answer a  few simple questions before falling promptly back to sleep.          ED Course   Critical Care  Date/Time: 4/18/2017 8:59 PM  Performed by: BERNA NUNEZ II  Authorized by: CLARA GANDARA   Direct patient critical care time: 20 minutes  Additional history critical care time: 8 minutes  Ordering / reviewing critical care time: 11 minutes  Documentation critical care time: 10 minutes  Consulting other physicians critical care time: 6 minutes  Consult with family critical care time: 4 minutes  Total critical care time (exclusive of procedural time) : 59 minutes  Critical care was necessary to treat or prevent imminent or life-threatening deterioration of the following conditions: CNS failure or compromise, metabolic crisis, endocrine crisis and renal failure.        Labs Reviewed   COMPREHENSIVE METABOLIC PANEL - Abnormal; Notable for the following:        Result Value    Sodium 149 (*)     CO2 32 (*)     BUN, Bld 85 (*)     Creatinine 3.9 (*)     Calcium 15.5 (*)     eGFR if  11 (*)     eGFR if non  10 (*)     All other components within normal limits    Narrative:      CA  critical result(s) called and verbal readback obtained from ALFREDO Hamilton., 04/18/2017 14:54   CBC W/ AUTO DIFFERENTIAL - Abnormal; Notable for the following:     RBC 3.89 (*)     MCH 31.1 (*)     Gran% 74.5 (*)     All other components within normal limits   MAGNESIUM   PHOSPHORUS   CBC W/ AUTO DIFFERENTIAL     EKG Readings: (Independently Interpreted)   Initial Reading: No STEMI.   Sinus rhythm rate of 83. Pre-existing RBBB. Diffuse ST segment changes.           Medical Decision Making:   Clinical Tests:   Lab Tests: Ordered and Reviewed  Medical Tests: Ordered and Reviewed  Other:   I have discussed this case with another health care provider.       <> Summary of the Discussion: 3:54 PM - I discussed the case with Yee Paz PA-C (hospitalist) who will admit the patient to Dr. Gandara.     3:59 PM - I  discussed the case with Dr. Arvizu (Nephrology) who agrees with the current plan and has no further recommendations at this time.     Additional MDM:   EKG: I have independently interpreted EKG(s) - see notes.          Scribe Attestation:   Scribe #1: I performed the above scribed service and the documentation accurately describes the services I performed. I attest to the accuracy of the note.    Attending Attestation:           Physician Attestation for Scribe:  Physician Attestation Statement for Scribe #1: I, Dr. Ham, reviewed documentation, as scribed by Raul Estrada in my presence, and it is both accurate and complete.                 ED Course     Patient presents company by her daughter due to increased lethargy, decreased mental status over the past few days.  Her past medical history significant for parathyroidectomy with difficulty with both hypercalcemia and hypocalcemia.  The daughter notes that recently while in skilled nursing she had her calcitriol titrated up due to hypocalcemia.  She relates that today's presenting symptoms are similar to prior episodes of hypercalcemia and she indeed had outpatient laboratory studies drawn yesterday with which showed significantly elevated calcium.  The patient is dehydrated upon examination IV fluid repletion was begun.  Laboratory studies confirmed significant hypercalcemia along with acute renal insufficiency.  Further IV fluid bolus have been ordered.  Case discussed with both the hospitalist as well as her nephrologist, who is quite familiar with the patient    Clinical Impression:     1. Hypercalcemia    2. Hypertension    3. Acute renal insufficiency    4. Altered mental status, unspecified altered mental status type               Toni Ham II, MD  04/18/17 7042

## 2017-04-19 PROBLEM — K59.00 CONSTIPATION: Status: ACTIVE | Noted: 2017-04-19

## 2017-04-19 LAB
25(OH)D3+25(OH)D2 SERPL-MCNC: 32 NG/ML
ANION GAP SERPL CALC-SCNC: 13 MMOL/L
BUN SERPL-MCNC: 75 MG/DL
CA-I BLDV-SCNC: 1.75 MMOL/L
CALCIUM SERPL-MCNC: 13.8 MG/DL
CHLORIDE SERPL-SCNC: 109 MMOL/L
CO2 SERPL-SCNC: 25 MMOL/L
CREAT SERPL-MCNC: 3.5 MG/DL
EST. GFR  (AFRICAN AMERICAN): 13 ML/MIN/1.73 M^2
EST. GFR  (NON AFRICAN AMERICAN): 11 ML/MIN/1.73 M^2
GLUCOSE SERPL-MCNC: 83 MG/DL
MAGNESIUM SERPL-MCNC: 1.9 MG/DL
PHOSPHATE SERPL-MCNC: 3.9 MG/DL
POTASSIUM SERPL-SCNC: 3.3 MMOL/L
SODIUM SERPL-SCNC: 147 MMOL/L
TSH SERPL DL<=0.005 MIU/L-ACNC: 0.58 UIU/ML

## 2017-04-19 PROCEDURE — 83735 ASSAY OF MAGNESIUM: CPT

## 2017-04-19 PROCEDURE — 93010 ELECTROCARDIOGRAM REPORT: CPT | Mod: ,,, | Performed by: INTERNAL MEDICINE

## 2017-04-19 PROCEDURE — 80048 BASIC METABOLIC PNL TOTAL CA: CPT

## 2017-04-19 PROCEDURE — 82330 ASSAY OF CALCIUM: CPT

## 2017-04-19 PROCEDURE — 25000003 PHARM REV CODE 250: Performed by: NURSE PRACTITIONER

## 2017-04-19 PROCEDURE — 11000001 HC ACUTE MED/SURG PRIVATE ROOM

## 2017-04-19 PROCEDURE — 93005 ELECTROCARDIOGRAM TRACING: CPT

## 2017-04-19 PROCEDURE — 84100 ASSAY OF PHOSPHORUS: CPT

## 2017-04-19 PROCEDURE — 63600175 PHARM REV CODE 636 W HCPCS: Performed by: NURSE PRACTITIONER

## 2017-04-19 PROCEDURE — 25000003 PHARM REV CODE 250: Performed by: PHYSICIAN ASSISTANT

## 2017-04-19 PROCEDURE — 25000003 PHARM REV CODE 250: Performed by: INTERNAL MEDICINE

## 2017-04-19 PROCEDURE — 99233 SBSQ HOSP IP/OBS HIGH 50: CPT | Mod: ,,, | Performed by: HOSPITALIST

## 2017-04-19 PROCEDURE — 84443 ASSAY THYROID STIM HORMONE: CPT

## 2017-04-19 PROCEDURE — 36415 COLL VENOUS BLD VENIPUNCTURE: CPT

## 2017-04-19 RX ORDER — BISACODYL 5 MG
10 TABLET, DELAYED RELEASE (ENTERIC COATED) ORAL ONCE
Status: DISCONTINUED | OUTPATIENT
Start: 2017-04-19 | End: 2017-04-21

## 2017-04-19 RX ORDER — DEXTROSE MONOHYDRATE AND SODIUM CHLORIDE 5; .45 G/100ML; G/100ML
INJECTION, SOLUTION INTRAVENOUS CONTINUOUS
Status: DISCONTINUED | OUTPATIENT
Start: 2017-04-19 | End: 2017-04-23

## 2017-04-19 RX ORDER — POTASSIUM CHLORIDE 7.45 MG/ML
10 INJECTION INTRAVENOUS ONCE
Status: COMPLETED | OUTPATIENT
Start: 2017-04-19 | End: 2017-04-19

## 2017-04-19 RX ORDER — CALCITONIN SALMON 200 [IU]/.09ML
1 SPRAY, METERED NASAL DAILY
Status: DISCONTINUED | OUTPATIENT
Start: 2017-04-19 | End: 2017-04-22

## 2017-04-19 RX ORDER — HYDRALAZINE HYDROCHLORIDE 20 MG/ML
10 INJECTION INTRAMUSCULAR; INTRAVENOUS EVERY 8 HOURS PRN
Status: DISCONTINUED | OUTPATIENT
Start: 2017-04-19 | End: 2017-04-25 | Stop reason: HOSPADM

## 2017-04-19 RX ADMIN — DEXTROSE AND SODIUM CHLORIDE: 5; .45 INJECTION, SOLUTION INTRAVENOUS at 10:04

## 2017-04-19 RX ADMIN — CINACALCET HYDROCHLORIDE 60 MG: 30 TABLET, COATED ORAL at 05:04

## 2017-04-19 RX ADMIN — HYDRALAZINE HYDROCHLORIDE 10 MG: 20 INJECTION INTRAMUSCULAR; INTRAVENOUS at 08:04

## 2017-04-19 RX ADMIN — HEPARIN SODIUM 5000 UNITS: 5000 INJECTION, SOLUTION INTRAVENOUS; SUBCUTANEOUS at 10:04

## 2017-04-19 RX ADMIN — DEXTROSE AND SODIUM CHLORIDE: 5; .45 INJECTION, SOLUTION INTRAVENOUS at 09:04

## 2017-04-19 RX ADMIN — POTASSIUM CHLORIDE 10 MEQ: 10 INJECTION, SOLUTION INTRAVENOUS at 09:04

## 2017-04-19 RX ADMIN — HEPARIN SODIUM 5000 UNITS: 5000 INJECTION, SOLUTION INTRAVENOUS; SUBCUTANEOUS at 06:04

## 2017-04-19 RX ADMIN — CALCITONIN SALMON 1 SPRAY: 200 SPRAY, METERED NASAL at 09:04

## 2017-04-19 RX ADMIN — HEPARIN SODIUM 5000 UNITS: 5000 INJECTION, SOLUTION INTRAVENOUS; SUBCUTANEOUS at 02:04

## 2017-04-19 NOTE — PROGRESS NOTES
Progress Note  Hospital Medicine    Admit Date: 4/18/2017   LOS: 1 day     SUBJECTIVE:     Follow-up For:  Hypercalcemia    Interval History:   Pt drowsy.  Doing okay.   Discussed with daughter at bedside.  D/w Dereck Montanez.     Review of Systems:  Constitutional: No fever or chills  Respiratory: No cough or shorness of breath  Cardiovascular: No chest pain or palpitations  Gastrointestinal: No nausea or vomiting  Neurological: Baseline dementia    OBJECTIVE:     Vital Signs Range (Last 24H):  Vitals:    04/19/17 1000   BP:    Pulse: 60   Resp:    Temp:        Temp:  [97.1 °F (36.2 °C)-98.8 °F (37.1 °C)]   Pulse:  []   Resp:  [14-30]   BP: (139-197)/(65-78)   SpO2:  [95 %-100 %]     I & O (Last 24H):    Intake/Output Summary (Last 24 hours) at 04/19/17 1059  Last data filed at 04/19/17 0736   Gross per 24 hour   Intake             1749 ml   Output              100 ml   Net             1649 ml       Physical Exam:  General appearance: Calm, NAD  Eyes:  Conjunctivae/corneas clear. PERRL.  Lungs: Normal respiratory effort,   clear to auscultation bilaterally   Heart: Regular rate and rhythm, S1, S2 normal,  Abdomen: Soft, non-tender non-distended; bowel sounds normal;   Extremities: No cyanosis or clubbing. No edema.    Skin: Skin color, texture, turgor normal. No rashes or lesions  Neurologic:  No focal numbness or weakness     Laboratory Data:  Reviewed and noted  where applicable- Please see chart for full lab data.    Medications:  Medication list was reviewed and changes noted under Assessment/Plan.    Diagnostic Results:  No new      ASSESSMENT/PLAN:     Active Hospital Problems    Diagnosis  POA    *Hypercalcemia [E83.52]  Yes    Constipation [K59.00]  Yes    Hypernatremia [E87.0]  Yes    LLOYD (acute kidney injury) [N17.9]  Yes    Generalized weakness [R53.1]  Yes    Dementia [F03.90]  Yes     2012: Diagnosed with Alzheimer's Dementia.      Chronic kidney disease, stage IV (severe) [N18.4]  Yes      11/15/2012: BUN/crea 38/2.1. CrCl 22.  Right arm fistula.      Hyperparathyroidism [E21.3]  Yes     2010: Diagnosed.      Hypertension [I10]  Yes     2000: Diagnosed.        Resolved Hospital Problems    Diagnosis Date Resolved POA   No resolved problems to display.         * Hypercalcemia  - IVF's, was given 1 L in ED  - Nephrology managing  - monitor Ca++  - Suspected from increase in dose of calcitriol recently at   - Calcitonin per nephrology following        Chronic kidney disease, stage IV (severe)  - patient has had one round of HD , Renal fxn had stabilized and HD is on hold per family  - follow Creatinine, decreasing.         LLOYD (acute kidney injury)  - continue IVF's  - monitor        Hypertension  - on amlodipine  - monitor        Hyperparathyroidism  - on sensipar  - calcitriol on hold        Hypernatremia  - suspect d/t volume depletion  - monitor with IVF's      Weakness  - PT OT    Constipation  - Bisacodyl PO       VTE Risk Mitigation           Ordered       heparin (porcine) injection 5,000 Units Every 8 hours    Route: Subcutaneous          04/18/17 1717       Medium Risk of VTE Once      04/18/17 1717       Place sequential compression device Until discontinued      04/18/17 1717       Place BARBARA hose Until discontinued

## 2017-04-19 NOTE — PLAN OF CARE
Problem: Patient Care Overview  Goal: Plan of Care Review  Outcome: Ongoing (interventions implemented as appropriate)  Pt free of trauma, falls, and injury. Pt VSS and afebrile throughout shift. Pt dressing is clean, dry, and intact. Pt did not express or show any signs of pain. Pt is NPO and incontinent to urine. Purposeful rounding done. Pt has call light in reach, bed alarm on, bed brakes on, side rails up x3, bed in low position, TEDs/SCDs on, and nonskid socks on. Pt lying in bed in no distress. Sitter at bedside. Will continue to monitor.

## 2017-04-19 NOTE — PLAN OF CARE
Problem: Fall Risk (Adult)  Goal: Identify Related Risk Factors and Signs and Symptoms  Related risk factors and signs and symptoms are identified upon initiation of Human Response Clinical Practice Guideline (CPG)   Outcome: Ongoing (interventions implemented as appropriate)  Pt free of trauma, falls, and injury. Pt VSS and afebrile throughout shift. Pt telemetry monitor maintained, running NSR, HR 84. Pt free of skin breakdown. Pt had no complaints throughout shift. Pt has been voiding adequately throughout shift. Pt is having difficulty swallowing meds. Pt has call light in reach, bed alarm on, bed brakes on, side rails up x2, bed in low position, TEDs/SCDs on, IS at bedside, and nonskid socks on. Pt lying in bed in no distress. Will continue to monitor.

## 2017-04-19 NOTE — PLAN OF CARE
Problem: Patient Care Overview  Goal: Plan of Care Review  Outcome: Ongoing (interventions implemented as appropriate)  AAOX4. VSS.Pt free of trauma, falls, injury and skin breakdown. NPO status maintained. Pt repositioned throughout shift with assist x1. Bedpan at the bedside. Purposeful hourly rounding. Pt has call light in reach, side rails up X2, bed in low position, TEDs/SCDs and nonskid socks on. Pt lying in bed in no distress with family at the bedside.

## 2017-04-19 NOTE — PROGRESS NOTES
Attempted to give pt morning meds. Pt couldn't suck through the straw for me to even swallow water. No oral morning meds given.

## 2017-04-19 NOTE — PHYSICIAN QUERY
"PT Name: Mary Singleton  MR #: [id number]    Physician Query Form - Neurological Condition Clarification       CDS/: Kacie Gallo               Contact information: Filomena Gallo RN, [email or web address]    This form is a permanent document in the medical record.     Query Date: April 19, 2017    By submitting this query, we are merely seeking further clarification of documentation. Please utilize your independent clinical judgment when addressing the question(s) below.    The Medical record contains the following:   Indicators   Supporting Clinical Findings Location in Medical Record   X AMS, Confusion, LOC, etc. Mental Status Changes  Baseline dementia has worsened over past 48-72 hours and she is very sleepy. 4/18-Consult   X Acute / Chronic Illness Primary HPTH, CKD Stage 4, LLOYD, HTN, Hypothyroidism, Alzheimer's Dementia, Hypercalcemia, Hypernatremia 4/18-H&P    Radiology Findings     X  X Electrolyte Imbalance Hypercalcemia  Calcium 15.5, Sodium 149 4/18-H&P  4/18-Labs    Medication     X Treatment "Has received copious IVNS in ER and on floor, will change to 1/2 NS given her hypernatremia. No lasix indicated. Hold Calcitriol., continue Sensipar but will switch dose to 60 BID due to tendency to cause nausea at such high doses." 4/18-Consult   X Other "Alz Dementia: Exacerbated by hypercalcemia. Cont. Home meds. Place all four rails up to keep her free of falls. Encourage family to keep someone at bedside for redirection and ensure she doesn't get out of bed." 4/18-Consult     Provider, please specify the diagnosis or diagnoses associated with above clinical findings.    [ x ] Metabolic Encephalopathy    [  ] Toxic Encephalopathy    [  ] Other Encephalopathy    [  ] Unspecified Encephalopathy    [  ] Other (please specify): _____________________________________    [  ] Clinically Undetermined      Please document in your progress notes daily for the duration of treatment until resolved, and  " include in your discharge summary.

## 2017-04-19 NOTE — PLAN OF CARE
DC Planning:    Writer met patient at bedside to discuss plan of care, patient asleep, daughter Dai at bedside as well. Per Dai, patient lives alone at her residence but daughter lives 2 doors away and checks on her daily. Occasionally, adult grandson will spend the night at patient's home.     Patient recently was DC'd for [facility] for SNF (approx 3 weeks ago) and was improving in regards to independence of ADLs . Per Dai, patient was prescribed higher dose of calcitonin and calcium levels increased drastically, [place] [facility]      Patient has worked with [facility] prior to admission, would like to work with them again post DC as daughter feels patient will require HHC again.     CM to follow and remain supportive.      04/19/17 0940   Discharge Assessment   Assessment Type Discharge Planning Assessment   Confirmed/corrected address and phone number on facesheet? Yes   Assessment information obtained from? Patient   Prior to hospitilization cognitive status: Alert/Oriented   Prior to hospitalization functional status: Independent;Assistive Equipment   Current cognitive status: Alert/Oriented   Current Functional Status: Independent;Assistive Equipment   Lives With alone   Able to Return to Prior Arrangements yes   Is patient able to care for self after discharge? Unable to determine at this time (comments)   How many people do you have in your home that can help with your care after discharge? other (see comments)  (daughter lives 2 doors down)   Who are your caregiver(s) and their phone number(s)? Dai Torres, daughter, [phone]   Patient currently being followed by outpatient case management? No   Patient currently receives home health services? No   Does the patient currently use HME? No   Patient currently receives private duty nursing? N/A   Patient currently receives any other outside agency services? No   Equipment Currently Used at Home walker, standard;walker,  rolling;wheelchair   Do you have any problems affording any of your prescribed medications? No   Is the patient taking medications as prescribed? yes   Do you have any financial concerns preventing you from receiving the healthcare you need? No   Does the patient have transportation to healthcare appointments? Yes   Transportation Available family or friend will provide   Discharge Plan A Home Health   Patient/Family In Agreement With Plan yes

## 2017-04-19 NOTE — CONSULTS
REASON FOR CONSULTATION:  LLOYD on CKD4, hypercalcemia     HPI:89 y.o. female well known to me with CKD4 and primary hyperpara presents from home with complaints of mental status changes, sleepiness, poor appetite, weakness.  Apparently the home health agency tr her routine Q2 week bloodwork yesterday and found her serum calcium level to be over 15.  She was referred to ER for admission.  Family reported she was discharged from SNF 3 weeks ago on calcitriol 0.5 mcg daily.  Her normal dose was 0.25 mcg daily.  She is also on 120 mg Sensipar daily to control her HPT and hypercalcemia.  She has been urinating more than normal.  Baseline dementia has worsened over past 48-72 hours and she is very sleepy.  She is unable to give a history and all details obtained from her son and daughter-in-law.      REVIEW OF SYSTEMS:  See HPI for all pertinent ROS.    Past Medical History:   Diagnosis Date    CKD (chronic kidney disease), stage IV     Dementia 11/2/2016    2012: Diagnosed with Alzheimer's Dementia.    HPTH (hyperparathyroidism)     Hypertension     Osteoporosis        Past Surgical History:   Procedure Laterality Date    parathyroid removal         Review of patient's allergies indicates:   Allergen Reactions    Penicillins      Doesn't remember it has been years       Prescriptions Prior to Admission   Medication Sig Dispense Refill Last Dose    amlodipine (NORVASC) 10 MG tablet Take 10 mg by mouth once daily.   4/18/2017 at Unknown time    aspirin (ECOTRIN) 81 MG EC tablet Take 1 tablet (81 mg total) by mouth once daily. 90 tablet 3 4/18/2017 at Unknown time    calcitRIOL (ROCALTROL) 0.5 MCG Cap Take 0.5 mcg by mouth once daily.   4/16/2017    cetirizine (ZYRTEC) 5 MG tablet Take 5 mg by mouth once daily.   4/18/2017 at Unknown time    cinacalcet (SENSIPAR) 60 MG Tab Take 2 tablets (120 mg total) by mouth daily with breakfast. 60 tablet 0 4/18/2017 at Unknown time    DENOSUMAB (PROLIA SUBQ) Inject into  the skin every 6 (six) months.    Taking    donepezil (ARICEPT) 10 MG tablet Take 10 mg by mouth once daily.    4/18/2017 at Unknown time    fluoxetine (PROZAC) 20 MG capsule Take 1 capsule (20 mg total) by mouth once daily.   4/18/2017 at Unknown time    levothyroxine (SYNTHROID) 150 MCG tablet Take 150 mcg by mouth once daily.   4/18/2017 at Unknown time    megestrol (MEGACE) 40 MG Tab Take 40 mg by mouth once daily.   4/17/2017 at Unknown time    quetiapine (SEROQUEL) 25 MG Tab Take 25 mg by mouth every evening.    Past Week at Unknown time    flu vacc [id number] 65yr up,PF, (FLUZONE HIGH-DOSE 2016-17, PF,) 180 mcg/0.5 mL Syrg Inject 0.5 mLs into the muscle once.   10/25/2016       Current Facility-Administered Medications   Medication Dose Route Frequency Provider Last Rate Last Dose    0.45% NaCl infusion   Intravenous Continuous April SHAHEED Arvizu MD        acetaminophen tablet 650 mg  650 mg Oral Q8H PRN Yee Paz PA-C        amlodipine tablet 10 mg  10 mg Oral Daily Susana Arvizu MD        [START ON 4/19/2017] aspirin EC tablet 81 mg  81 mg Oral Daily Yee Paz PA-C        cinacalcet tablet 60 mg  60 mg Oral BID WM Susana Arvizu MD        [START ON 4/19/2017] donepezil tablet 10 mg  10 mg Oral Daily Yee Paz PA-C        heparin (porcine) injection 5,000 Units  5,000 Units Subcutaneous Q8H Yee Paz PA-C        [START ON 4/19/2017] levothyroxine tablet 150 mcg  150 mcg Oral Before breakfast Yee Paz PA-C        megestrol 400 mg/10 mL (10 mL) suspension 400 mg  400 mg Oral Daily Susana Arvizu MD        ondansetron injection 4 mg  4 mg Intravenous Q8H PRN Yee Paz PA-C        senna-docusate 8.6-50 mg per tablet 1 tablet  1 tablet Oral BID PRN Yee Paz PA-C           Social History     Social History    Marital status:      Spouse name: N/A    Number of children: N/A    Years of education: N/A     Social History Main Topics    Smoking  status: Never Smoker    Smokeless tobacco: Never Used    Alcohol use No    Drug use: No    Sexual activity: No     Other Topics Concern    None     Social History Narrative       Family History   Problem Relation Age of Onset    Hypertension Father     Cancer Sister     Hearing loss Sister        Temp:  [97.1 °F (36.2 °C)-97.5 °F (36.4 °C)] 97.5 °F (36.4 °C)  Pulse:  [76-86] 86  Resp:  [14-30] 20  SpO2:  [96 %-100 %] 98 %  BP: (165-197)/(72-78) 197/78      PHYSICAL EXAM:    GEN: Sleepy and snoring loudly, flutters eyes open to physical stimulation  HEENT:  NCAT, JORGE, No conjunctivitis, normal sclera, O/P clear, no oropharyngial lesions, no thrush, neck supple, No LAD, Nml JVD, no carotid bruits  CV:  RRR no MRG  Lungs:  CTAb, no rhonchi, wheeze, crackles, normal excursion  Abdomen: soft, NTND, no fluid wave, no HSM, NABS  Ext:  No CCE, normal DP pulses bilat, + Right UE AVF intact with T/B  Neuro:  Non-Focal, EOMI, gait not assessed  Skin:  No rash, excoriation, petchiae    Labs:  Reviewed    No CXR for perusal    EKG:  Initial Reading: No STEMI.   Sinus rhythm rate of 83. Pre-existing RBBB. Diffuse ST segment changes.     ASSESSMENT AND PLAN:    89 YO WF with LLOYD on CKD4, Alzheimer's dementia, primary hyperparathyroidism with symptomatic hypercalcemia and volume depletion.  1. Hypercalcemia:  Has received copious IVNS in ER and on floor, will change to 1/2 NS given her hypernatremia.  No lasix indicated.  Hold Calcitriol., continue Sensipar but will switch dose to 60 BID due to tendency to cause nausea at such high doses.  2. LLOYD on CKD4:  Likely due to volume depletion.  No indication for HD.  Had recent admission during which she required temporary HD, but she has since stablized and not required HD.  She has an intact AVF.  3. HTN:  Restart oral Norvasc.    4. Alz Dementia:  Exacerbated by hypercalcemia.  Cont. Home meds.  Place all four rails up to keep her free of falls.  Encourage family to keep  someone at bedside for redirection and ensure she doesn't get out of bed.  5. Contraction Alkylosis:  Volume repletion.  6. Hypothyroidism:  On synthroid check TSH.  7. H/O CHF:  Monitor respiratory status closely given she's on aggressive IVF's.

## 2017-04-19 NOTE — ASSESSMENT & PLAN NOTE
- patient has had one round of HD , Renal fxn had stabilized and HD is on hold per family  - follow Cr

## 2017-04-19 NOTE — H&P
[facility] Medicine  History & Physical    Patient Name: Mary Singleton  MRN: [id number]  Admission Date: 4/18/2017  Attending Physician: Rene Smith MD   Primary Care Provider: Susana Arvizu MD         Patient information was obtained from relative(s), past medical records and ER records.     Subjective:     Principal Problem:Hypercalcemia    Chief Complaint:   Chief Complaint   Patient presents with    Hypercalcemia     sent by home health for hypercalcemia w/ lab work drawn yesterday, hx dementia        HPI: 88 y/o female with Hx of 1 HPTH s/p one parathyroid removal, CKDIV, HTN, hypothyroidism, Dementia sent by home health after her calcium levels from her blood work done yesterday was too high. Per family patient has increased weakness, confusion, decreased appetite. She has a Hx of similar hypercalcemia and gets drowsy and fatigued each time. Her daughter denies that she has had any fever, CP, SOB, nausea and vomiting.  Currently no on hemodialysis.     Past Medical History:   Diagnosis Date    CKD (chronic kidney disease), stage IV     Dementia 11/2/2016    2012: Diagnosed with Alzheimer's Dementia.    HPTH (hyperparathyroidism)     Hypertension     Osteoporosis        Past Surgical History:   Procedure Laterality Date    parathyroid removal         Review of patient's allergies indicates:   Allergen Reactions    Penicillins      Doesn't remember it has been years       No current facility-administered medications on file prior to encounter.      Current Outpatient Prescriptions on File Prior to Encounter   Medication Sig    amlodipine (NORVASC) 10 MG tablet Take 10 mg by mouth once daily.    aspirin (ECOTRIN) 81 MG EC tablet Take 1 tablet (81 mg total) by mouth once daily.    cetirizine (ZYRTEC) 5 MG tablet Take 5 mg by mouth once daily.    cinacalcet (SENSIPAR) 60 MG Tab Take 2 tablets (120 mg total) by mouth daily with breakfast.    DENOSUMAB (PROLIA SUBQ)  Inject into the skin every 6 (six) months.     donepezil (ARICEPT) 10 MG tablet Take 10 mg by mouth once daily.     fluoxetine (PROZAC) 20 MG capsule Take 1 capsule (20 mg total) by mouth once daily.    levothyroxine (SYNTHROID) 150 MCG tablet Take 150 mcg by mouth once daily.    quetiapine (SEROQUEL) 25 MG Tab Take 25 mg by mouth every evening.     [DISCONTINUED] acetaminophen (TYLENOL) 500 mg Cap     [DISCONTINUED] amlodipine (NORVASC) 2.5 MG tablet Take 2.5 mg by mouth once daily.    [DISCONTINUED] calcitRIOL (ROCALTROL) 0.25 MCG Cap Take 0.25 mcg by mouth once daily.     [DISCONTINUED] megestrol (MEGACE) 20 MG Tab Take 2 tablets (40 mg total) by mouth once daily.     Family History     Problem Relation (Age of Onset)    Cancer Sister    Hearing loss Sister    Hypertension Father        Social History Main Topics    Smoking status: Never Smoker    Smokeless tobacco: Never Used    Alcohol use No    Drug use: No    Sexual activity: No     Review of Systems   Unable to perform ROS: Dementia     Objective:     Vital Signs (Most Recent):  Temp: 97.5 °F (36.4 °C) (04/18/17 1729)  Pulse: 86 (04/18/17 1729)  Resp: 20 (04/18/17 1729)  BP: (!) 197/78 (04/18/17 1729)  SpO2: 98 % (04/18/17 1729) Vital Signs (24h Range):  Temp:  [97.1 °F (36.2 °C)-97.5 °F (36.4 °C)] 97.5 °F (36.4 °C)  Pulse:  [76-86] 86  Resp:  [14-30] 20  SpO2:  [96 %-100 %] 98 %  BP: (165-197)/(72-78) 197/78     Weight: 56.7 kg (125 lb)  Body mass index is 21.46 kg/(m^2).    Physical Exam   Constitutional: She appears well-developed and well-nourished. No distress.   Elderly female in no distress   HENT:   Head: Normocephalic and atraumatic.   Mouth/Throat: No oropharyngeal exudate.   Dry mucous membranes   Eyes: Conjunctivae are normal. No scleral icterus.   Neck: Normal range of motion. Neck supple. No JVD present.   Cardiovascular: Normal rate, regular rhythm, normal heart sounds and intact distal pulses.    Pulmonary/Chest: Effort normal  and breath sounds normal. No respiratory distress. She has no wheezes.   Abdominal: Soft. Bowel sounds are normal. She exhibits no distension. There is no tenderness.   Musculoskeletal: She exhibits no edema or tenderness.   Neurological:   Arousable, alert to self   Skin: Skin is warm and dry.   RUE fistula, + thrill   Nursing note and vitals reviewed.       Significant Labs:   CBC:   Recent Labs  Lab 04/18/17  1416   WBC 7.62   HGB 12.1   HCT 37.4        CMP:   Recent Labs  Lab 04/18/17  1416   *   K 3.5      CO2 32*   GLU 98   BUN 85*   CREATININE 3.9*   CALCIUM 15.5*   PROT 7.0   ALBUMIN 3.8   BILITOT 0.4   ALKPHOS 63   AST 25   ALT 14   ANIONGAP 12   EGFRNONAA 10*         Significant Imaging:   Imaging Results     None            Assessment/Plan:     * Hypercalcemia  - IVF's, was given 1 L in ED  - will defer to Nephrology re: lasix given LLOYD on CKD  - monitor Ca++  - Nephrology consulted      Chronic kidney disease, stage IV (severe)  - patient has had one round of HD , Renal fxn had stabilized and HD is on hold per family  - follow Cr      LLOYD (acute kidney injury)  - continue IVF's  - monitor      Hypertension  - on amlodipine  - monitor      Hyperparathyroidism  - on sensipar  - calcitriol on hold      Hypernatremia  - suspect d/t volume depletion  - monitor with IVF's      VTE Risk Mitigation         Ordered     heparin (porcine) injection 5,000 Units  Every 8 hours     Route:  Subcutaneous        04/18/17 1717     Medium Risk of VTE  Once      04/18/17 1717     Place sequential compression device  Until discontinued      04/18/17 1717     Place BARBARA hose  Until discontinued      04/18/17 1717        Yee Paz PA-C  Department of Hospital Medicine   [facility]

## 2017-04-19 NOTE — PROGRESS NOTES
"Nephrology  Progress Note    Admit Date: 4/18/2017   LOS: 1 day     SUBJECTIVE:     Follow-up For:  Hypercalcemia    Interval History:     Remains drowsy and AMS.  Discussed at length with daughter-in-law at bedside.  Moans to questions.  Labs reviewed.  Keep NPO until more alert.     Review of Systems:    AMS    OBJECTIVE:     Vital Signs Range (Last 24H):  /70 (BP Location: Left arm, Patient Position: Lying, BP Method: Automatic)  Pulse 69  Temp 98.8 °F (37.1 °C) (Axillary)   Resp 20  Ht 5' 4" (1.626 m)  Wt 56.7 kg (125 lb)  SpO2 97%  Breastfeeding? No  BMI 21.46 kg/m2    Temp:  [97.1 °F (36.2 °C)-98.8 °F (37.1 °C)]   Pulse:  []   Resp:  [14-30]   BP: (139-197)/(65-78)   SpO2:  [95 %-100 %]     I & O (Last 24H):  Intake/Output Summary (Last 24 hours) at 04/19/17 0926  Last data filed at 04/19/17 0736   Gross per 24 hour   Intake             1749 ml   Output              100 ml   Net             1649 ml       Physical Exam:  General appearance: elderly and ill appearing.  Moaning but no visible signs of distress  Eyes:  Conjunctivae/corneas clear. PERRL.  Lungs: Normal respiratory effort,   clear to auscultation bilaterally   Heart: Regular rate and rhythm, S1, S2 normal, no murmur, rub or julio c.  Abdomen: Soft, non-tender non-distended; bowel sounds normal; no masses,  no organomegaly  Extremities: No cyanosis or clubbing. No edema.    Skin: Skin color, texture, turgor normal. No rashes or lesions  Neurologic: No focal numbness or weakness   Right Arm AVF:  +    Laboratory Data:    Recent Labs  Lab 04/18/17  1416   WBC 7.62   RBC 3.89*   HGB 12.1   HCT 37.4      MCV 96   MCH 31.1*   MCHC 32.4       BMP:   Recent Labs  Lab 04/19/17  0502   GLU 83   *   K 3.3*      CO2 25   BUN 75*   CREATININE 3.5*   CALCIUM 13.8*   MG 1.9     Lab Results   Component Value Date    .1 (H) 04/18/2017    CALCIUM 13.8 (HH) 04/19/2017    CAION 1.75 (H) 04/19/2017    PHOS 3.9 04/19/2017 "             Medications:  Medication list was reviewed and changes noted under Assessment/Plan.    Diagnostic Results:        ASSESSMENT/PLAN:     88 YO WF with LLOYD on CKD4B, Alzheimer's dementia, primary hyperparathyroidism with symptomatic hypercalcemia and volume depletion.        1. Hypercalcemia: Slowly resolving.  Hold Calcitriol, continue Sensipar dose 60 BID due to tendency to cause nausea at such high doses.  Give Calcitonin nasally today.  Continue with IVF's.    2. LLOYD on CKD4B: Likely due to volume depletion. No indication for HD yet. Had recent admission during which she required temporary HD, but she has since stablized and not required HD. She has an intact AVF.  3. HTN: Restarted oral Norvasc. Use hydralazine PRN.   4. Alz Dementia: Exacerbated by hypercalcemia. Cont. Home meds. Place all four rails up to keep her free of falls. Encourage family to keep someone at bedside for redirection and ensure she doesn't get out of bed.  NPO for now until more awake.   5. Hypothyroidism: On synthroid.  TSH/T4 wnl.  6. Hypernatremia (E87.0):  Adjusted IVF's.    7. Mild hypokalemia (E87.6):  Replace and monitor.    8. H/O CHF: seems compensated at present but will decrease volume of IVF's.           See above orders/recs.

## 2017-04-20 PROBLEM — I44.1 MOBITZ TYPE 1 SECOND DEGREE ATRIOVENTRICULAR BLOCK: Status: ACTIVE | Noted: 2017-04-20

## 2017-04-20 LAB
ANION GAP SERPL CALC-SCNC: 12 MMOL/L
AORTIC VALVE STENOSIS: NORMAL
BUN SERPL-MCNC: 63 MG/DL
CA-I BLDV-SCNC: 1.64 MMOL/L
CALCIUM SERPL-MCNC: 12.9 MG/DL
CHLORIDE SERPL-SCNC: 110 MMOL/L
CO2 SERPL-SCNC: 24 MMOL/L
CREAT SERPL-MCNC: 3.5 MG/DL
DIASTOLIC DYSFUNCTION: NO
EST. GFR  (AFRICAN AMERICAN): 13 ML/MIN/1.73 M^2
EST. GFR  (NON AFRICAN AMERICAN): 11 ML/MIN/1.73 M^2
ESTIMATED PA SYSTOLIC PRESSURE: 30.46
GLOBAL PERICARDIAL EFFUSION: NORMAL
GLUCOSE SERPL-MCNC: 80 MG/DL
POTASSIUM SERPL-SCNC: 3.4 MMOL/L
RETIRED EF AND QEF - SEE NOTES: 70 (ref 55–65)
SODIUM SERPL-SCNC: 146 MMOL/L
TRICUSPID VALVE REGURGITATION: NORMAL

## 2017-04-20 PROCEDURE — 63600175 PHARM REV CODE 636 W HCPCS: Performed by: NURSE PRACTITIONER

## 2017-04-20 PROCEDURE — 93306 TTE W/DOPPLER COMPLETE: CPT

## 2017-04-20 PROCEDURE — 97162 PT EVAL MOD COMPLEX 30 MIN: CPT

## 2017-04-20 PROCEDURE — 25000003 PHARM REV CODE 250: Performed by: PHYSICIAN ASSISTANT

## 2017-04-20 PROCEDURE — 25000003 PHARM REV CODE 250: Performed by: NURSE PRACTITIONER

## 2017-04-20 PROCEDURE — 36415 COLL VENOUS BLD VENIPUNCTURE: CPT

## 2017-04-20 PROCEDURE — 25000003 PHARM REV CODE 250: Performed by: INTERNAL MEDICINE

## 2017-04-20 PROCEDURE — 82330 ASSAY OF CALCIUM: CPT

## 2017-04-20 PROCEDURE — 97535 SELF CARE MNGMENT TRAINING: CPT

## 2017-04-20 PROCEDURE — 11000001 HC ACUTE MED/SURG PRIVATE ROOM

## 2017-04-20 PROCEDURE — 97530 THERAPEUTIC ACTIVITIES: CPT

## 2017-04-20 PROCEDURE — 80048 BASIC METABOLIC PNL TOTAL CA: CPT

## 2017-04-20 PROCEDURE — 93005 ELECTROCARDIOGRAM TRACING: CPT

## 2017-04-20 PROCEDURE — 99232 SBSQ HOSP IP/OBS MODERATE 35: CPT | Mod: ,,, | Performed by: HOSPITALIST

## 2017-04-20 PROCEDURE — 93010 ELECTROCARDIOGRAM REPORT: CPT | Mod: ,,, | Performed by: INTERNAL MEDICINE

## 2017-04-20 PROCEDURE — 97166 OT EVAL MOD COMPLEX 45 MIN: CPT

## 2017-04-20 RX ORDER — POTASSIUM CHLORIDE 7.45 MG/ML
10 INJECTION INTRAVENOUS ONCE
Status: COMPLETED | OUTPATIENT
Start: 2017-04-20 | End: 2017-04-20

## 2017-04-20 RX ADMIN — HEPARIN SODIUM 5000 UNITS: 5000 INJECTION, SOLUTION INTRAVENOUS; SUBCUTANEOUS at 06:04

## 2017-04-20 RX ADMIN — ASPIRIN 81 MG: 81 TABLET, COATED ORAL at 11:04

## 2017-04-20 RX ADMIN — CALCITONIN SALMON 1 SPRAY: 200 SPRAY, METERED NASAL at 11:04

## 2017-04-20 RX ADMIN — AMLODIPINE BESYLATE 10 MG: 5 TABLET ORAL at 11:04

## 2017-04-20 RX ADMIN — HYDRALAZINE HYDROCHLORIDE: 20 INJECTION INTRAMUSCULAR; INTRAVENOUS at 09:04

## 2017-04-20 RX ADMIN — HEPARIN SODIUM 5000 UNITS: 5000 INJECTION, SOLUTION INTRAVENOUS; SUBCUTANEOUS at 03:04

## 2017-04-20 RX ADMIN — HEPARIN SODIUM 5000 UNITS: 5000 INJECTION, SOLUTION INTRAVENOUS; SUBCUTANEOUS at 10:04

## 2017-04-20 RX ADMIN — CINACALCET HYDROCHLORIDE 60 MG: 30 TABLET, COATED ORAL at 11:04

## 2017-04-20 RX ADMIN — CINACALCET HYDROCHLORIDE 60 MG: 30 TABLET, COATED ORAL at 05:04

## 2017-04-20 RX ADMIN — DEXTROSE AND SODIUM CHLORIDE: 5; .45 INJECTION, SOLUTION INTRAVENOUS at 10:04

## 2017-04-20 RX ADMIN — POTASSIUM CHLORIDE 10 MEQ: 10 INJECTION, SOLUTION INTRAVENOUS at 12:04

## 2017-04-20 NOTE — PT/OT/SLP EVAL
Occupational Therapy  Evaluation/Treatment    Mary Singleton   MRN: [id number]   Admitting Diagnosis: Hypercalcemia    OT Date of Treatment: 04/20/17   OT Start Time: 1015  OT Stop Time: 1042  OT Total Time (min): 27 min    Billable Minutes:  Evaluation 15  Self Care/Home Management 12  Total Time 27    Diagnosis: Hypercalcemia   The primary encounter diagnosis was Hypercalcemia. Diagnoses of Hypertension, Acute renal insufficiency, Altered mental status, unspecified altered mental status type, Mobitz type 2 second degree atrioventricular block, Bradycardia, and A-fib were also pertinent to this visit.      Past Medical History:   Diagnosis Date    CKD (chronic kidney disease), stage IV     Dementia 11/2/2016    2012: Diagnosed with Alzheimer's Dementia.    HPTH (hyperparathyroidism)     Hypertension     Osteoporosis       Past Surgical History:   Procedure Laterality Date    parathyroid removal         Referring physician: SHAHEED Smith  Date referred to OT: 4/20/2017    General Precautions: Standard, fall (full liquid diet)  Orthopedic Precautions: N/A  Braces: N/A    Do you have any cultural, spiritual, Yarsani conflicts, given your current situation?: Mosque     Patient History:  Living Environment  Lives With: child(amira), adult  Living Arrangements: house  Living Environment Comment: Per daughter's report, pt. recently discharged from SNF 2 weeks ago to daughter's 2SH with no steps and has walk n shower with shower seat; pt. has bed down stairs; Pt. was receiving  PT/OT/nurse; pt. ambulated with RW in the home without help per anton's report and ssisted with bathing and dressing but reported pt toileted self without help. Prior to ~1 month ago/prior to SNF/previous hospital, pt. lived alone and grandson would stay the night with her. Pt would go to daughter's house during the day which is 2 houses from pt.'s house. Daughter does all IADLS and drives.  Equipment Currently Used at Home: walker,  rolling, walker, standard, bedside commode, grab bar, wheelchair, shower chair (built in shower seat)    Prior level of function:   Bed Mobility/Transfers: needs device  Grooming: needs assist  Bathing: needs device and assist  Upper Body Dressing: needs assist  Lower Body Dressing: needs assist  Toileting: needs device  Home Management Skills: unable to perform  Homemaking Responsibilities: No  Driving License: No  Mode of Transportation: Family     Dominant hand: right    Subjective:  Communicated with nurse prior to session.    Chief Complaint: none  Patient/Family stated goals: to go to SNF after discharged    Pain Ratin/10              Pain Rating Post-Intervention: 0/10    Objective:  Patient found with: telemetry, peripheral IV    Cognitive Exam:  Oriented to: Person  Follows Commands/attention: Follows one-step commands  Communication: low voice quality  Memory:  Impaired STM, Impaired LTM and Poor immediate recall  Safety awareness/insight to disability: impaired  Coping skills/emotional control: Appropriate to situation    Visual/perceptual:  Intact    Physical Exam:  Postural examination/scapula alignment: Rounded shoulder, Head forward and Kyphosis  Skin integrity: Bruising of legs   Edema: None noted BLE    Sensation:   Intact    Upper Extremity Range of Motion:  Right Upper Extremity: WFL-90 shld AROM flex  Left Upper Extremity: WFL except shld hx limited AROM 2/2 fx; ~20 flex noted    Upper Extremity Strength:  Right Upper Extremity: Deficits: shld 3-/5; distally 3+/5  Left Upper Extremity: Deficits: shld 2+/5; distally 3+/5   Strength: 3+/5    Fine motor coordination:   Impaired  Left hand, manipulation of objects max and Right hand, manipulation of objects max    Gross motor coordination: tremors    Functional Mobility:  Bed Mobility:  Rolling/Turning Right: Moderate assistance, With side rail  Scooting/Bridging: Maximum Assistance  Supine to Sit: Maximum Assistance, WIth side rail  Sit to  "Supine: Maximum Assistance    Transfers:  Sit <> Stand Assistance: Maximum Assistance  Sit <> Stand Assistive Device: No Assistive Device  Bed <> Chair Technique: Stand Pivot  Bed <> Chair Transfer Assistance: Total Assistance (does take steps but shaky and hesitates; poor balance)  Bed <> Chair Assistive Device: No Assistive Device    Functional Ambulation: NA    Activities of Daily Living:  Feeding Level of Assistance: Total assistance  UE Dressing Level of Assistance: Total assistance  LE Dressing Level of Assistance: Total assistance  Grooming Position: Seated  Grooming Level of Assistance: Total assistance  Toileting Where Assessed: Bedside commode  Toileting Level of Assistance: Maximum assistance      Balance:   Static Sit: FAIR+: Able to take MINIMAL challenges from all directions  Dynamic Sit: FAIR+: Maintains balance through MINIMAL excursions of active trunk motion  Static Stand: 0: Needs MAXIMAL assist to maintain   Dynamic stand: 0: N/A    Therapeutic Activities and Exercises:  BSC SPT with max vc for hand placement and and advancement of feet.    AM-PAC 6 CLICK ADL  How much help from another person does this patient currently need?  1 = Unable, Total/Dependent Assistance  2 = A lot, Maximum/Moderate Assistance  3 = A little, Minimum/Contact Guard/Supervision  4 = None, Modified [place]/Independent    Putting on and taking off regular lower body clothing? : 1  Bathing (including washing, rinsing, drying)?: 1  Toileting, which includes using toilet, bedpan, or urinal? : 1  Putting on and taking off regular upper body clothing?: 1  Taking care of personal grooming such as brushing teeth?: 1  Eating meals?: 1  Total Score: 6    AM-PAC Raw Score CMS "G-Code Modifier Level of Impairment Assistance   6 % Total / Unable   7 - 9 CM 80 - 100% Maximal Assist   10 - 14 CL 60 - 80% Moderate Assist   15 - 19 CK 40 - 60% Moderate Assist   20 - 22 CJ 20 - 40% Minimal Assist   23 CI 1-20% SBA / CGA   24 CH " 0% Independent/ Mod I       Patient left supine with all lines intact, call button in reach, nurse notified and escort and daughter present    Assessment:  Mary Singleton is a 89 y.o. female with a medical diagnosis of Hypercalcemia and presents with weakness, tremors, poor standing balance, poor endurance, decreased UE and LE strength , impaired cognition limiting indep and safety in ADLS and fx mobility. Pt was continent of urine. DC placement and DME recommendations pending progress.  Continue with OT POC.      Rehab identified problem list/impairments: Rehab identified problem list/impairments: weakness, impaired functional mobilty, impaired balance, impaired cognition, decreased upper extremity function, decreased safety awareness, impaired coordination, impaired skin, decreased ROM, impaired fine motor, gait instability, impaired self care skills, impaired endurance, decreased coordination, decreased lower extremity function    Rehab potential is good.    Activity tolerance: Fair    Discharge recommendations: Discharge Facility/Level Of Care Needs:  (TBD)     Barriers to discharge: Barriers to Discharge: None    Equipment recommendations:  (TBD)     GOALS:   Occupational Therapy Goals        Problem: Occupational Therapy Goal    Goal Priority Disciplines Outcome Interventions   Occupational Therapy Goal     OT, PT/OT Ongoing (interventions implemented as appropriate)    Description:  Goals to be met by: 4/30/2017    Patient will increase functional independence with ADLs by performing:    Feeding with Stand-by Assistance.  Grooming while seated with Stand-by Assistance.  Toileting from bedside commode with Moderate Assistance for hygiene and clothing management.   Rolling to Bilateral with Contact Guard Assistance.   Supine to sit with Minimal Assistance.  Toilet transfer to bedside commode with Moderate Assistance.  Increased functional strength to 4-/5 for RUE; LUE elbow and hand 4-/5.  Upper extremity  exercise program 10 reps per handout, with assistance as needed.                PLAN:  Patient to be seen 6 x/week to address the above listed problems via self-care/home management, therapeutic activities, therapeutic exercises  Plan of Care expires: 05/20/17  Plan of Care reviewed with: patient, daughter         Diamante Sales, OT  04/20/2017

## 2017-04-20 NOTE — PLAN OF CARE
Problem: Patient Care Overview  Goal: Plan of Care Review  Outcome: Ongoing (interventions implemented as appropriate)  Remains free from fall, injury, and skin breakdown. Voided spontaneously per bedpan; Incontinence care performed PRN. Elevated BP noted at this shift; otherwise stable on RA and afebrile. Telemetry maintained. Tolerating Q2H turning schedule. TEDs/SCDs maintained. Assisted patient with meals. Tolerating ordered diet; poor appetite noted.  Crushed pills and mixed with apple sauce.  IV site WNL. Plan of care reviewed with daughter and all questions answered. Bed low, locked w/ bed alarm on. Call light within reach. Purposeful rounding performed. No other complaints at this time.

## 2017-04-20 NOTE — SUBJECTIVE & OBJECTIVE
Past Medical History:   Diagnosis Date    CKD (chronic kidney disease), stage IV     Dementia 11/2/2016    2012: Diagnosed with Alzheimer's Dementia.    HPTH (hyperparathyroidism)     Hypertension     Osteoporosis        Past Surgical History:   Procedure Laterality Date    parathyroid removal         Review of patient's allergies indicates:   Allergen Reactions    Penicillins      Doesn't remember it has been years       No current facility-administered medications on file prior to encounter.      Current Outpatient Prescriptions on File Prior to Encounter   Medication Sig    amlodipine (NORVASC) 10 MG tablet Take 10 mg by mouth once daily.    aspirin (ECOTRIN) 81 MG EC tablet Take 1 tablet (81 mg total) by mouth once daily.    cetirizine (ZYRTEC) 5 MG tablet Take 5 mg by mouth once daily.    cinacalcet (SENSIPAR) 60 MG Tab Take 2 tablets (120 mg total) by mouth daily with breakfast.    DENOSUMAB (PROLIA SUBQ) Inject into the skin every 6 (six) months.     donepezil (ARICEPT) 10 MG tablet Take 10 mg by mouth once daily.     fluoxetine (PROZAC) 20 MG capsule Take 1 capsule (20 mg total) by mouth once daily.    levothyroxine (SYNTHROID) 150 MCG tablet Take 150 mcg by mouth once daily.    quetiapine (SEROQUEL) 25 MG Tab Take 25 mg by mouth every evening.      Family History     Problem Relation (Age of Onset)    Cancer Sister    Hearing loss Sister    Hypertension Father        Social History Main Topics    Smoking status: Never Smoker    Smokeless tobacco: Never Used    Alcohol use No    Drug use: No    Sexual activity: No     Review of Systems   Unable to perform ROS: dementia     Objective:     Vital Signs (Most Recent):  Temp: 98.4 °F (36.9 °C) (04/19/17 1600)  Pulse: 87 (04/19/17 1800)  Resp: 18 (04/19/17 1600)  BP: (!) 155/68 (04/19/17 1600)  SpO2: 95 % (04/19/17 1600) Vital Signs (24h Range):  Temp:  [97.9 °F (36.6 °C)-98.8 °F (37.1 °C)] 98.4 °F (36.9 °C)  Pulse:  [] 87  Resp:   [16-18] 18  SpO2:  [95 %-98 %] 95 %  BP: (134-174)/(64-74) 155/68     Weight: 56.7 kg (125 lb)  Body mass index is 21.46 kg/(m^2).    SpO2: 95 %  O2 Device (Oxygen Therapy): room air      Intake/Output Summary (Last 24 hours) at 04/19/17 1906  Last data filed at 04/19/17 1752   Gross per 24 hour   Intake             2655 ml   Output              100 ml   Net             2555 ml       Lines/Drains/Airways     Drain                 Hemodialysis AV Fistula Right forearm -- days          Peripheral Intravenous Line                 Peripheral IV - Single Lumen 04/18/17 1307 Left Antecubital 1 day                Physical Exam   Constitutional: She appears lethargic. She appears ill. No distress.   Neck: Carotid bruit is present.   Cardiovascular: Normal rate, regular rhythm, S1 normal and S2 normal.  Exam reveals gallop and S4.    Murmur heard.  Pulmonary/Chest: Effort normal.   Abdominal: Normal appearance. There is no tenderness.   Neurological: She appears lethargic.   Skin: Skin is warm and dry. No rash noted.       Current Medications:     amlodipine  10 mg Oral Daily    aspirin  81 mg Oral Daily    bisacodyl  10 mg Oral Once    calcitonin (salmon)  1 spray Alternating Nares Daily    cinacalcet  60 mg Oral BID WM    donepezil  10 mg Oral Daily    heparin (porcine)  5,000 Units Subcutaneous Q8H    levothyroxine  150 mcg Oral Before breakfast     Current Laboratory Results:    Recent Results (from the past 24 hour(s))   TSH    Collection Time: 04/18/17  8:49 PM   Result Value Ref Range    TSH 0.479 0.400 - 4.000 uIU/mL   T4, free    Collection Time: 04/18/17  8:49 PM   Result Value Ref Range    Free T4 1.33 0.71 - 1.51 ng/dL   Vitamin D    Collection Time: 04/18/17  8:49 PM   Result Value Ref Range    Vit D, 25-Hydroxy 32 30 - 96 ng/mL   PTH, intact    Collection Time: 04/18/17  8:49 PM   Result Value Ref Range    PTH, Intact 284.1 (H) 9.0 - 77.0 pg/mL   Urinalysis    Collection Time: 04/18/17 10:10 PM   Result  Value Ref Range    Specimen UA Urine, Clean Catch     Color, UA Yellow Yellow, Straw, Roxana    Appearance, UA Clear Clear    pH, UA 6.0 5.0 - 8.0    Specific Gravity, UA 1.020 1.005 - 1.030    Protein, UA Trace (A) Negative    Glucose, UA Negative Negative    Ketones, UA Negative Negative    Bilirubin (UA) Negative Negative    Occult Blood UA Trace (A) Negative    Nitrite, UA Negative Negative    Urobilinogen, UA Negative <2.0 EU/dL    Leukocytes, UA Negative Negative   TSH    Collection Time: 04/19/17  5:02 AM   Result Value Ref Range    TSH 0.578 0.400 - 4.000 uIU/mL   Basic Metabolic Panel (BMP)    Collection Time: 04/19/17  5:02 AM   Result Value Ref Range    Sodium 147 (H) 136 - 145 mmol/L    Potassium 3.3 (L) 3.5 - 5.1 mmol/L    Chloride 109 95 - 110 mmol/L    CO2 25 23 - 29 mmol/L    Glucose 83 70 - 110 mg/dL    BUN, Bld 75 (H) 8 - 23 mg/dL    Creatinine 3.5 (H) 0.5 - 1.4 mg/dL    Calcium 13.8 (HH) 8.7 - 10.5 mg/dL    Anion Gap 13 8 - 16 mmol/L    eGFR if African American 13 (A) >60 mL/min/1.73 m^2    eGFR if non African American 11 (A) >60 mL/min/1.73 m^2   Magnesium    Collection Time: 04/19/17  5:02 AM   Result Value Ref Range    Magnesium 1.9 1.6 - 2.6 mg/dL   Phosphorus    Collection Time: 04/19/17  5:02 AM   Result Value Ref Range    Phosphorus 3.9 2.7 - 4.5 mg/dL   Calcium, ionized    Collection Time: 04/19/17  5:02 AM   Result Value Ref Range    Calcium, Ion 1.75 (H) 1.06 - 1.42 mmol/L     Current Imaging Results:    Imaging Results     None        ECG: SR. Mobitz 1 AV2 Block with 2:1 AV conduction.

## 2017-04-20 NOTE — CONSULTS
[facility]  Cardiology  Consult Note    Patient Name: Mary Singleton  MRN: [id number]  Admission Date: 4/18/2017  Hospital Length of Stay: 1 days  Code Status: Full Code   Attending Provider: Rene Smith MD   Consulting Provider: Adrien Lazo MD  Primary Care Physician: Susana Arvizu MD  Principal Problem:Hypercalcemia    Patient information was obtained from patient and past medical records.     Consults  Subjective:     Chief Complaint:  Bradycardia.     HPI:   Mary Singleton is a 89 y.o. female with hypertension. She has severe carotid disease having undergone left CEA in 2002. She has late onset quite advanced Alzheimer's dementia. She has hypothyroidism, hyperparathyroidism and stage four chronic kidney disease. She has a right arm fistula that was briefly used a few months ago.    She presents with further changes in mental status and is found to be hypercalcemic. She is on telemetry and today had episodes of Second degree AV block, Mobitz 1 with 2:1 AV conduction with a heart rate in 40s. She is currently bedridden and not able to give any history.       Past Medical History:   Diagnosis Date    CKD (chronic kidney disease), stage IV     Dementia 11/2/2016    2012: Diagnosed with Alzheimer's Dementia.    HPTH (hyperparathyroidism)     Hypertension     Osteoporosis        Past Surgical History:   Procedure Laterality Date    parathyroid removal         Review of patient's allergies indicates:   Allergen Reactions    Penicillins      Doesn't remember it has been years       No current facility-administered medications on file prior to encounter.      Current Outpatient Prescriptions on File Prior to Encounter   Medication Sig    amlodipine (NORVASC) 10 MG tablet Take 10 mg by mouth once daily.    aspirin (ECOTRIN) 81 MG EC tablet Take 1 tablet (81 mg total) by mouth once daily.    cetirizine (ZYRTEC) 5 MG tablet Take 5 mg by mouth once daily.    cinacalcet (SENSIPAR) 60  MG Tab Take 2 tablets (120 mg total) by mouth daily with breakfast.    DENOSUMAB (PROLIA SUBQ) Inject into the skin every 6 (six) months.     donepezil (ARICEPT) 10 MG tablet Take 10 mg by mouth once daily.     fluoxetine (PROZAC) 20 MG capsule Take 1 capsule (20 mg total) by mouth once daily.    levothyroxine (SYNTHROID) 150 MCG tablet Take 150 mcg by mouth once daily.    quetiapine (SEROQUEL) 25 MG Tab Take 25 mg by mouth every evening.      Family History     Problem Relation (Age of Onset)    Cancer Sister    Hearing loss Sister    Hypertension Father        Social History Main Topics    Smoking status: Never Smoker    Smokeless tobacco: Never Used    Alcohol use No    Drug use: No    Sexual activity: No     Review of Systems   Unable to perform ROS: dementia     Objective:     Vital Signs (Most Recent):  Temp: 98.4 °F (36.9 °C) (04/19/17 1600)  Pulse: 87 (04/19/17 1800)  Resp: 18 (04/19/17 1600)  BP: (!) 155/68 (04/19/17 1600)  SpO2: 95 % (04/19/17 1600) Vital Signs (24h Range):  Temp:  [97.9 °F (36.6 °C)-98.8 °F (37.1 °C)] 98.4 °F (36.9 °C)  Pulse:  [] 87  Resp:  [16-18] 18  SpO2:  [95 %-98 %] 95 %  BP: (134-174)/(64-74) 155/68     Weight: 56.7 kg (125 lb)  Body mass index is 21.46 kg/(m^2).    SpO2: 95 %  O2 Device (Oxygen Therapy): room air      Intake/Output Summary (Last 24 hours) at 04/19/17 1906  Last data filed at 04/19/17 1752   Gross per 24 hour   Intake             2655 ml   Output              100 ml   Net             2555 ml       Lines/Drains/Airways     Drain                 Hemodialysis AV Fistula Right forearm -- days          Peripheral Intravenous Line                 Peripheral IV - Single Lumen 04/18/17 1307 Left Antecubital 1 day                Physical Exam   Constitutional: She appears lethargic. She appears ill. No distress.   Neck: Carotid bruit is present.   Cardiovascular: Normal rate, regular rhythm, S1 normal and S2 normal.  Exam reveals gallop and S4.    Murmur  heard.  Pulmonary/Chest: Effort normal.   Abdominal: Normal appearance. There is no tenderness.   Neurological: She appears lethargic.   Skin: Skin is warm and dry. No rash noted.       Current Medications:     amlodipine  10 mg Oral Daily    aspirin  81 mg Oral Daily    bisacodyl  10 mg Oral Once    calcitonin (salmon)  1 spray Alternating Nares Daily    cinacalcet  60 mg Oral BID WM    donepezil  10 mg Oral Daily    heparin (porcine)  5,000 Units Subcutaneous Q8H    levothyroxine  150 mcg Oral Before breakfast     Current Laboratory Results:    Recent Results (from the past 24 hour(s))   TSH    Collection Time: 04/18/17  8:49 PM   Result Value Ref Range    TSH 0.479 0.400 - 4.000 uIU/mL   T4, free    Collection Time: 04/18/17  8:49 PM   Result Value Ref Range    Free T4 1.33 0.71 - 1.51 ng/dL   Vitamin D    Collection Time: 04/18/17  8:49 PM   Result Value Ref Range    Vit D, 25-Hydroxy 32 30 - 96 ng/mL   PTH, intact    Collection Time: 04/18/17  8:49 PM   Result Value Ref Range    PTH, Intact 284.1 (H) 9.0 - 77.0 pg/mL   Urinalysis    Collection Time: 04/18/17 10:10 PM   Result Value Ref Range    Specimen UA Urine, Clean Catch     Color, UA Yellow Yellow, Straw, Roxana    Appearance, UA Clear Clear    pH, UA 6.0 5.0 - 8.0    Specific Gravity, UA 1.020 1.005 - 1.030    Protein, UA Trace (A) Negative    Glucose, UA Negative Negative    Ketones, UA Negative Negative    Bilirubin (UA) Negative Negative    Occult Blood UA Trace (A) Negative    Nitrite, UA Negative Negative    Urobilinogen, UA Negative <2.0 EU/dL    Leukocytes, UA Negative Negative   TSH    Collection Time: 04/19/17  5:02 AM   Result Value Ref Range    TSH 0.578 0.400 - 4.000 uIU/mL   Basic Metabolic Panel (BMP)    Collection Time: 04/19/17  5:02 AM   Result Value Ref Range    Sodium 147 (H) 136 - 145 mmol/L    Potassium 3.3 (L) 3.5 - 5.1 mmol/L    Chloride 109 95 - 110 mmol/L    CO2 25 23 - 29 mmol/L    Glucose 83 70 - 110 mg/dL    BUN, Bld 75  (H) 8 - 23 mg/dL    Creatinine 3.5 (H) 0.5 - 1.4 mg/dL    Calcium 13.8 (HH) 8.7 - 10.5 mg/dL    Anion Gap 13 8 - 16 mmol/L    eGFR if African American 13 (A) >60 mL/min/1.73 m^2    eGFR if non African American 11 (A) >60 mL/min/1.73 m^2   Magnesium    Collection Time: 04/19/17  5:02 AM   Result Value Ref Range    Magnesium 1.9 1.6 - 2.6 mg/dL   Phosphorus    Collection Time: 04/19/17  5:02 AM   Result Value Ref Range    Phosphorus 3.9 2.7 - 4.5 mg/dL   Calcium, ionized    Collection Time: 04/19/17  5:02 AM   Result Value Ref Range    Calcium, Ion 1.75 (H) 1.06 - 1.42 mmol/L     Current Imaging Results:    Imaging Results     None        ECG: SR. Mobitz 1 AV2 Block with 2:1 AV conduction.    Assessment and Plan:     1. Atrioventricular Block, Second Degree, Mobitz 1    4/19/2017: With 2:1 AV block.   Will stop donepezil but doubt cause.   A pacemaker would be reasonable with this degree of conduction disease.   However she has multiple medical issues including advanced dementia.   Discussed with family.   A reasonable approach may be to follow her and if her mental status improves and she is no longer bedridden consider a pacemaker at that time.    2. Carotid Artery Stenosis              2002: Left CEA.              10/26/2016: Carotid Duplex: GABBIE: Moderate plaquing - 1.9 m/s - 60-70%. LCCA: Proximal 1.7 m/s - 50-70%. LICA: Moderate plaquing - <50%.              See no need for further follow up at this age with dementia.              On aspirin 81 mg Q24.              Stable.     3. Hypertension      2000: Diagnosed.              On amlodipine 2.5 mg Q24 and lisinopril 20 mg Q24.              4. Chronic Kidney Disease, Stage 4   11/15/2012: BUN/crea 38/2.1. CrCl 22.              Right arm fistula.              Dr. Susana Arvizu.     5. Dementia              2012: Diagnosed with Alzheimer's Dementia.              Now advanced.   Dr. Dereck Patel.     6. Hyperparathyroidism              2010: Diagnosed.     7.  Hypothyroidism              1990: Diagnosed.              On levothyroxine 150 mcg Q24.      8. Primary Care              Dr. Susana Arvizu.     \  VTE Risk Mitigation         Ordered     heparin (porcine) injection 5,000 Units  Every 8 hours     Route:  Subcutaneous        04/18/17 1717     Medium Risk of VTE  Once      04/18/17 1717     Place sequential compression device  Until discontinued      04/18/17 1717     Place BARBARA hose  Until discontinued      04/18/17 1717          Thank you for your consult.    I will follow-up with patient. Please contact us if you have any additional questions.    Adrien Lazo MD  Cardiology   [facility]

## 2017-04-20 NOTE — PLAN OF CARE
Problem: Physical Therapy Goal  Goal: Physical Therapy Goal  Goals to be met by: 5/15/17    Patient will increase functional independence with mobility by performin. Supine > sit with supervision.   2. Sit > supine with supervision.   3. Sit <> stand transfer with supervision with rolling walker.   4. Gait > 50 feet with CGA with rolling walker.   5. Ascend/descend curb step with CGA with rolling walker.   Outcome: Ongoing (interventions implemented as appropriate)  PT evaluation completed. Patient requires total assist for bed mobility and sit<>stand transfers at bedside. She is unable to ambulate, despite ability to ambulate at home during the 2 weeks prior to this hospital admission following recent discharge from SNF. Will continue to follow and progress as tolerated. Please see progress note for detailed plan of care and recommendations including SNF.

## 2017-04-20 NOTE — PROGRESS NOTES
"Nephrology  Progress Note    Admit Date: 4/18/2017   LOS: 2 days     SUBJECTIVE:     Follow-up For:  Hypercalcemia    Interval History:     Slightly more alert this am.  Aniket improving with calcitonin.  Still unable to tolerate diet yet.  Discussed with daughter at bedside.     Review of Systems:    AMS    OBJECTIVE:     Vital Signs Range (Last 24H):  BP (!) 147/65  Pulse 79  Temp 97.7 °F (36.5 °C) (Axillary)   Resp 18  Ht 5' 4" (1.626 m)  Wt 56.7 kg (125 lb)  SpO2 96%  Breastfeeding? No  BMI 21.46 kg/m2    Temp:  [97.7 °F (36.5 °C)-98.4 °F (36.9 °C)]   Pulse:  [78-96]   Resp:  [18]   BP: (134-197)/(64-86)   SpO2:  [94 %-98 %]     I & O (Last 24H):    Intake/Output Summary (Last 24 hours) at 04/20/17 1002  Last data filed at 04/19/17 1752   Gross per 24 hour   Intake              906 ml   Output                0 ml   Net              906 ml       Physical Exam:  General appearance: elderly and ill appearing.  Slightly more alert today.  Eyes:  Conjunctivae/corneas clear. PERRL.  Lungs: Normal respiratory effort,   clear to auscultation bilaterally   Heart: Irregular/Irregular rate and rhythm, S1, S2 normal, no murmur, rub or julio c.  Abdomen: Soft, non-tender non-distended; bowel sounds normal; no masses,  no organomegaly  Extremities: No cyanosis or clubbing. No edema.    Skin: Skin color, texture, turgor normal. No rashes or lesions  Neurologic: No focal numbness or weakness   Right Arm AVF:  +    Laboratory Data:  No results for input(s): WBC, RBC, HGB, HCT, PLT, MCV, MCH, MCHC in the last 24 hours.    BMP:     Recent Labs  Lab 04/19/17  0502 04/20/17  0602   GLU 83 80   * 146*   K 3.3* 3.4*    110   CO2 25 24   BUN 75* 63*   CREATININE 3.5* 3.5*   CALCIUM 13.8* 12.9*   MG 1.9  --      Lab Results   Component Value Date    .1 (H) 04/18/2017    CALCIUM 12.9 (HH) 04/20/2017    CAION 1.64 (H) 04/20/2017    PHOS 3.9 04/19/2017             Medications:  Medication list was reviewed and " changes noted under Assessment/Plan.    Diagnostic Results:        ASSESSMENT/PLAN:     88 YO WF with LLOYD on CKD4B, Alzheimer's dementia, primary hyperparathyroidism with symptomatic hypercalcemia and volume depletion.        1. Hypercalcemia: Slowly resolving.  Hold Calcitriol.  Using nasal calcitonin due to pt unable to tolerate diet/liquids yet.  Continue with IVF's.    2. LLOYD on CKD4B: Likely due to volume depletion. No indication for HD yet. Had recent admission during which she required temporary HD, but she has since stablized and not required HD. She has an intact AVF.  3. HTN: Restarted oral Norvasc but using hydralazine PRN.   4. Alz Dementia: Exacerbated by hypercalcemia. Holding meds for now. Place all four rails up to keep her free of falls. Encourage family to keep someone at bedside for redirection and ensure she doesn't get out of bed.    5. Hypothyroidism: On synthroid.  TSH/T4 wnl.  6. 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 Lazo following.  Considering Pacer.   7. Hypernatremia (E87.0):  Adjusted IVF's.    8. Mild hypokalemia (E87.6):  Replace and monitor.    9. H/O CHF: seems compensated at present but will decrease volume of IVF's.           See above orders/recs.

## 2017-04-20 NOTE — PT/OT/SLP EVAL
Physical Therapy  Evaluation and Treatment    Mary Singleton   MRN: [id number]   Admitting Diagnosis: Hypercalcemia    PT Received On: 04/20/17  PT Start Time: 1313     PT Stop Time: 1340    PT Total Time (min): 27 min       Billable Minutes:  Evaluation 12 and Therapeutic Activity 14    Diagnosis: Hypercalcemia      Past Medical History:   Diagnosis Date    CKD (chronic kidney disease), stage IV     Dementia 11/2/2016    2012: Diagnosed with Alzheimer's Dementia.    HPTH (hyperparathyroidism)     Hypertension     Osteoporosis       Past Surgical History:   Procedure Laterality Date    parathyroid removal         Referring physician: Luis  Date referred to PT: 4/19/17    General Precautions: Standard, fall (ambulate with assist)  Orthopedic Precautions: N/A   Braces: N/A       Do you have any cultural, spiritual, Restorationist conflicts, given your current situation?: Islam per chart    Patient History:  Living Environment Comment: Per daughter's report, pt. recently discharged from SNF 2 weeks ago to daughter's 2SH with no steps and has walk in shower with shower seat; pt. has bed down stairs; Pt. was receiving  PT/OT/nurse; pt. ambulated with prn use of RW in the home without help per anton's report and assisted with bathing and dressing but reported pt toileted self without help, although recently has begun to wear adult briefs due to urinary incontinence.  Prior to ~1 month ago/prior to SNF/previous hospital, pt. lived alone and grandson would stay the night with her. Pt would go to daughter's house during the day which is 2 houses from pt.'s house. Daughter does all IADLS and drives.  Equipment Currently Used at Home: walker, rolling, bedside commode, shower chair, wheelchair  DME owned (not currently used): none    Previous Level of Function:  Ambulation Skills: needs device  Transfer Skills: needs device  ADL Skills: needs device and assist    Subjective:  Communicated with nurse prior to  "session.  Pt stated, "I can't do this." re: stand up. Daughter present and stated "today is the first day she has gotten out of bed since she has been here."    Chief Complaint: pain, fatigue, weakness  Patient goals: none stated. Daughter's goal for patient is for her to walk again.     Pain Ratin/10   Pain Rating Post-Intervention: 0/10    Objective:   Patient found with: peripheral IV, telemetry supine in bed with HOB elevated. Daughter present.      Cognitive Exam:  Oriented to: Partial to person (name only, stated correct birthday x 2 attempts even when given cues).     Follows Commands/attention: Follows one-step commands approximately 25% of the time.   Communication: able to express wants and needs, although minimally conversant and responding to questions approximately 50% of the time  Safety awareness/insight to disability: impaired    Physical Exam:  Postural examination/scapula alignment: Rounded shoulder and severe thoracic kyphosis    Skin integrity: Visible skin intact  Edema: Mild bilateral LEs    Sensation:   Did not respond to questions regarding light touch    Upper Extremity Range of Motion:  Right Upper Extremity: WFL  Left Upper Extremity: severe impairment at shoulder    Lower Extremity Range of Motion:  Right Lower Extremity: WFL except hip flexion  Left Lower Extremity: WFL except hip flexion    Lower Extremity Strength:  Right Lower Extremity: 2+/5 hip flexion, 3-/5 knee extension  Left Lower Extremity: 2+/5 hip flexion, 3-/5 knee extension     No coordination or tone impairments identified.      Functional Mobility:  Bed Mobility:  Supine to Sit: Total Assistance, With side rail  Sit to Supine: Total Assistance, With siderail    Transfers:  Sit <> Stand Assistance: Total Assistance (x 3 trials, assist at trunk, verbal cues for technique)  Sit <> Stand Assistive Device: Grab bars (bilateral UE support on bed rails)    Gait:   Gait Distance: 2 trials, 2 lateral steps at bedside with " total A at trunk and manual assist to advance LEs laterally  Assistance 1: Total assistance      Balance:   Static Sit: FAIR+: Able to take MINIMAL challenges from all directions  Dynamic Sit: FAIR+: Maintains balance through MINIMAL excursions of active trunk motion  Static Stand: 0: Needs MAXIMAL assist to maintain   Dynamic stand: POOR: N/A    Therapeutic Activities and Exercises:  Education of benefits of OOB mobility, PT plan of care, transfer technique.   Pt required total assist for forward scooting once at edge of bed. Assisted with changing chick pad at end session due to small amount of urinary incontinence noted. Pt became less alert throughout session, and had difficulty maintaining eyes open near end session once returned to supine.     With activity: SpO2 on room air 96%, heart rate 102.     AM-PAC 6 CLICK MOBILITY  How much help from another person does this patient currently need?   1 = Unable, Total/Dependent Assistance  2 = A lot, Maximum/Moderate Assistance  3 = A little, Minimum/Contact Guard/Supervision  4 = None, Modified [place]/Independent    Turning over in bed (including adjusting bedclothes, sheets and blankets)?: 2  Sitting down on and standing up from a chair with arms (e.g., wheelchair, bedside commode, etc.): 1  Moving from lying on back to sitting on the side of the bed?: 1  Moving to and from a bed to a chair (including a wheelchair)?: 1  Need to walk in hospital room?: 1  Climbing 3-5 steps with a railing?: 1  Total Score: 7     AM-PAC Raw Score CMS G-Code Modifier Level of Impairment Assistance   6 % Total / Unable   7 - 9 CM 80 - 100% Maximal Assist   10 - 14 CL 60 - 80% Moderate Assist   15 - 19 CK 40 - 60% Moderate Assist   20 - 22 CJ 20 - 40% Minimal Assist   23 CI 1-20% SBA / CGA   24 CH 0% Independent/ Mod I     Patient left supine with all lines intact, call button in reach, bed alarm on, nurse notified and daughter present.    Assessment:   Mary Singleton  is a 89 y.o. female with a medical diagnosis of Hypercalcemia. PT evaluation completed. Patient requires total assist for bed mobility and sit<>stand transfers at bedside. She is unable to ambulate, despite ability to ambulate at home during the 2 weeks prior to this hospital admission following recent discharge from SNF. Pt has decreased independence with functional mobility presenting with below impairments. Pt would benefit from continued skilled PT to maximize independence and safety with functional mobility.      Rehab identified problem list/impairments: Rehab identified problem list/impairments: weakness, impaired endurance, impaired self care skills, impaired functional mobilty, impaired cognition, pain, decreased safety awareness, impaired balance, decreased lower extremity function, gait instability    Rehab potential is good.    Activity tolerance: Fair    Discharge recommendations: Discharge Facility/Level Of Care Needs: nursing facility, skilled (This is also daughter's preference)     Barriers to discharge: Barriers to Discharge: Decreased caregiver support (At baseline patient lives alone, she recently has been receiving more assistance from family and after recent previous SNF stay has begun to stay with daughter for assistance)    Equipment recommendations:       GOALS:   Physical Therapy Goals        Problem: Physical Therapy Goal    Goal Priority Disciplines Outcome Goal Variances Interventions   Physical Therapy Goal     PT/OT, PT Ongoing (interventions implemented as appropriate)     Description:  Goals to be met by: 5/15/17    Patient will increase functional independence with mobility by performin. Supine > sit with supervision.   2. Sit > supine with supervision.   3. Sit <> stand transfer with supervision with rolling walker.   4. Gait > 50 feet with CGA with rolling walker.   5. Ascend/descend curb step with CGA with rolling walker.                 PLAN:    Patient to be seen 6  x/week to address the above listed problems via therapeutic activities, therapeutic exercises, gait training, neuromuscular re-education  Plan of Care expires: 05/20/17  Plan of Care reviewed with: patient, daughter          Justina Koch, PT  04/20/2017

## 2017-04-21 PROBLEM — E87.0 HYPERNATREMIA: Status: RESOLVED | Noted: 2017-04-18 | Resolved: 2017-04-21

## 2017-04-21 LAB
ANION GAP SERPL CALC-SCNC: 9 MMOL/L
BUN SERPL-MCNC: 54 MG/DL
CA-I BLDV-SCNC: 1.53 MMOL/L
CALCIUM SERPL-MCNC: 11.2 MG/DL
CHLORIDE SERPL-SCNC: 109 MMOL/L
CO2 SERPL-SCNC: 24 MMOL/L
CREAT SERPL-MCNC: 3.2 MG/DL
EST. GFR  (AFRICAN AMERICAN): 14 ML/MIN/1.73 M^2
EST. GFR  (NON AFRICAN AMERICAN): 12 ML/MIN/1.73 M^2
GLUCOSE SERPL-MCNC: 119 MG/DL
POTASSIUM SERPL-SCNC: 3.2 MMOL/L
SODIUM SERPL-SCNC: 142 MMOL/L

## 2017-04-21 PROCEDURE — 25000003 PHARM REV CODE 250: Performed by: INTERNAL MEDICINE

## 2017-04-21 PROCEDURE — 97530 THERAPEUTIC ACTIVITIES: CPT

## 2017-04-21 PROCEDURE — 25000003 PHARM REV CODE 250: Performed by: PHYSICIAN ASSISTANT

## 2017-04-21 PROCEDURE — 80048 BASIC METABOLIC PNL TOTAL CA: CPT

## 2017-04-21 PROCEDURE — 36415 COLL VENOUS BLD VENIPUNCTURE: CPT

## 2017-04-21 PROCEDURE — 25000003 PHARM REV CODE 250: Performed by: NURSE PRACTITIONER

## 2017-04-21 PROCEDURE — 11000001 HC ACUTE MED/SURG PRIVATE ROOM

## 2017-04-21 PROCEDURE — 82330 ASSAY OF CALCIUM: CPT

## 2017-04-21 RX ORDER — POTASSIUM CHLORIDE 20 MEQ/15ML
20 SOLUTION ORAL ONCE
Status: COMPLETED | OUTPATIENT
Start: 2017-04-21 | End: 2017-04-21

## 2017-04-21 RX ORDER — POTASSIUM CHLORIDE 20 MEQ/1
20 TABLET, EXTENDED RELEASE ORAL ONCE
Status: DISCONTINUED | OUTPATIENT
Start: 2017-04-21 | End: 2017-04-21

## 2017-04-21 RX ORDER — BISACODYL 10 MG
10 SUPPOSITORY, RECTAL RECTAL ONCE
Status: DISCONTINUED | OUTPATIENT
Start: 2017-04-21 | End: 2017-04-25 | Stop reason: HOSPADM

## 2017-04-21 RX ORDER — POLYETHYLENE GLYCOL 3350 17 G/17G
17 POWDER, FOR SOLUTION ORAL DAILY
Status: DISCONTINUED | OUTPATIENT
Start: 2017-04-21 | End: 2017-04-25 | Stop reason: HOSPADM

## 2017-04-21 RX ADMIN — CINACALCET HYDROCHLORIDE 60 MG: 30 TABLET, COATED ORAL at 05:04

## 2017-04-21 RX ADMIN — CINACALCET HYDROCHLORIDE 60 MG: 30 TABLET, COATED ORAL at 09:04

## 2017-04-21 RX ADMIN — DEXTROSE AND SODIUM CHLORIDE: 5; .45 INJECTION, SOLUTION INTRAVENOUS at 04:04

## 2017-04-21 RX ADMIN — DEXTROSE AND SODIUM CHLORIDE: 5; .45 INJECTION, SOLUTION INTRAVENOUS at 05:04

## 2017-04-21 RX ADMIN — AMLODIPINE BESYLATE 10 MG: 5 TABLET ORAL at 09:04

## 2017-04-21 RX ADMIN — HEPARIN SODIUM 5000 UNITS: 5000 INJECTION, SOLUTION INTRAVENOUS; SUBCUTANEOUS at 09:04

## 2017-04-21 RX ADMIN — POTASSIUM CHLORIDE 20 MEQ: 40 SOLUTION ORAL at 12:04

## 2017-04-21 RX ADMIN — HEPARIN SODIUM 5000 UNITS: 5000 INJECTION, SOLUTION INTRAVENOUS; SUBCUTANEOUS at 05:04

## 2017-04-21 RX ADMIN — ASPIRIN 81 MG: 81 TABLET, COATED ORAL at 09:04

## 2017-04-21 RX ADMIN — HEPARIN SODIUM 5000 UNITS: 5000 INJECTION, SOLUTION INTRAVENOUS; SUBCUTANEOUS at 02:04

## 2017-04-21 RX ADMIN — LEVOTHYROXINE SODIUM 150 MCG: 25 TABLET ORAL at 05:04

## 2017-04-21 RX ADMIN — POLYETHYLENE GLYCOL 3350 17 G: 17 POWDER, FOR SOLUTION ORAL at 05:04

## 2017-04-21 RX ADMIN — CALCITONIN SALMON 1 SPRAY: 200 SPRAY, METERED NASAL at 10:04

## 2017-04-21 NOTE — ASSESSMENT & PLAN NOTE
- continue reduced dose of IVF's  - monitor  - 2/2 volume depletion  - no need for HD according to nephrology

## 2017-04-21 NOTE — PROGRESS NOTES
[facility]  Cardiology  Progress Note    Patient Name: Mary Singleton  MRN: [id number]  Admission Date: 4/18/2017  Hospital Length of Stay: 2 days  Code Status: Full Code   Attending Physician: Rene Smith MD   Primary Care Physician: Susana Arvizu MD  Expected Discharge Date:   Principal Problem:Hypercalcemia    Subjective:     Brief HPI:    Mary Singleton is a 89 y.o. female with hypertension. She has severe carotid disease having undergone left CEA in 2002. She has late onset quite advanced Alzheimer's dementia. She has hypothyroidism, hyperparathyroidism and stage four chronic kidney disease. She has a right arm fistula that was briefly used a few months ago.     She presents with further changes in mental status and is found to be hypercalcemic. She is on telemetry and today had episodes of Second degree AV block, Mobitz 1 with 2:1 AV conduction with a heart rate in 40s. She is currently bedridden and not able to give any history.     Hospital Course:     Supportive care.    On telemetry.    Interval History:     Mental status has improved. Still confused. No distress.    Review of Systems   Cardiovascular: Negative for chest pain and syncope.   Respiratory: Negative for cough and shortness of breath.      Objective:     Vital Signs (Most Recent):  Temp: 98.7 °F (37.1 °C) (04/20/17 1559)  Pulse: 91 (04/20/17 1800)  Resp: 18 (04/20/17 1559)  BP: 133/65 (04/20/17 1559)  SpO2: 97 % (04/20/17 1559) Vital Signs (24h Range):  Temp:  [97.7 °F (36.5 °C)-98.7 °F (37.1 °C)] 98.7 °F (37.1 °C)  Pulse:  [79-97] 91  Resp:  [18] 18  SpO2:  [94 %-97 %] 97 %  BP: (133-197)/(65-86) 133/65     Weight: 56.7 kg (125 lb)  Body mass index is 21.46 kg/(m^2).    SpO2: 97 %  O2 Device (Oxygen Therapy): room air      Intake/Output Summary (Last 24 hours) at 04/20/17 1907  Last data filed at 04/20/17 1757   Gross per 24 hour   Intake             1689 ml   Output               50 ml   Net             1639 ml        Lines/Drains/Airways     Drain                 Hemodialysis AV Fistula Right forearm -- days          Peripheral Intravenous Line                 Peripheral IV - Single Lumen 04/20/17 0912 Left Wrist less than 1 day                Physical Exam   Constitutional: She appears well-developed. She appears ill. No distress.   HENT:   Head: Normocephalic and atraumatic.   Nose: Nose normal.   Neck: No JVD present.   Cardiovascular: Normal rate, regular rhythm, S1 normal and S2 normal.  Exam reveals gallop and S4.    Pulmonary/Chest: Effort normal and breath sounds normal.   Abdominal: Normal appearance. There is no tenderness.   Musculoskeletal:        Right ankle: She exhibits no swelling.        Left ankle: She exhibits no swelling.   Neurological: She is disoriented.   Skin: Skin is warm and dry. No rash noted.     Current Medications:     amlodipine  10 mg Oral Daily    aspirin  81 mg Oral Daily    bisacodyl  10 mg Oral Once    calcitonin (salmon)  1 spray Alternating Nares Daily    cinacalcet  60 mg Oral BID WM    heparin (porcine)  5,000 Units Subcutaneous Q8H    levothyroxine  150 mcg Oral Before breakfast     Current Laboratory Results:    Recent Results (from the past 24 hour(s))   Basic metabolic panel    Collection Time: 04/20/17  6:02 AM   Result Value Ref Range    Sodium 146 (H) 136 - 145 mmol/L    Potassium 3.4 (L) 3.5 - 5.1 mmol/L    Chloride 110 95 - 110 mmol/L    CO2 24 23 - 29 mmol/L    Glucose 80 70 - 110 mg/dL    BUN, Bld 63 (H) 8 - 23 mg/dL    Creatinine 3.5 (H) 0.5 - 1.4 mg/dL    Calcium 12.9 (HH) 8.7 - 10.5 mg/dL    Anion Gap 12 8 - 16 mmol/L    eGFR if African American 13 (A) >60 mL/min/1.73 m^2    eGFR if non African American 11 (A) >60 mL/min/1.73 m^2   Calcium, ionized    Collection Time: 04/20/17  6:02 AM   Result Value Ref Range    Calcium, Ion 1.64 (H) 1.06 - 1.42 mmol/L   2D echo with color flow doppler    Collection Time: 04/20/17  9:15 AM   Result Value Ref Range    EF 70 55 -  65    Diastolic Dysfunction No     Aortic Valve Stenosis TRIVIAL     Est. PA Systolic Pressure 30.46     Pericardial Effusion NONE     Tricuspid Valve Regurgitation MILD      Current Imaging Results:    Imaging Results     None          Assessment and Plan:     Problem List:    Active Diagnoses:    Diagnosis Date Noted POA    PRINCIPAL PROBLEM:  Hypercalcemia [E83.52] 03/06/2017 Yes    Chronic kidney disease, stage IV (severe) [N18.4] 07/25/2013 Yes    Dementia [F03.90] 11/02/2016 Yes    Hypertension [I10] 07/25/2013 Yes    Hyperparathyroidism [E21.3] 07/25/2013 Yes    Mobitz type 1 second degree atrioventricular block [I44.1] 04/20/2017 Yes    Constipation [K59.00] 04/19/2017 Yes    Hypernatremia [E87.0] 04/18/2017 Yes    LLOYD (acute kidney injury) [N17.9] 04/18/2017 Yes    Generalized weakness [R53.1] 03/06/2017 Yes      Problems Resolved During this Admission:    Diagnosis Date Noted Date Resolved POA     Assessment and Plan:    1. Atrioventricular Block, Second Degree, Mobitz 1               4/19/2017: With 2:1 AV block.              Stopped donepezil but doubt cause.              A pacemaker would be reasonable with this degree of conduction disease.              However she has multiple medical issues including advanced dementia.              Discussed with family.              A reasonable approach may be to follow her and if her mental status improves and she is no longer bedridden consider a pacemaker at that time.     2. Carotid Artery Stenosis              2002: Left CEA.              10/26/2016: Carotid Duplex: GABBIE: Moderate plaquing - 1.9 m/s - 60-70%. LCCA: Proximal 1.7 m/s - 50-70%. LICA: Moderate plaquing - <50%.              See no need for further follow up at this age with dementia.              On aspirin 81 mg Q24.              Stable.     3. Hypertension              2000: Diagnosed.              On amlodipine 2.5 mg Q24 and lisinopril 20 mg Q24.     4. Chronic Kidney Disease, Stage  4              11/15/2012: BUN/crea 38/2.1. CrCl 22.              Right arm fistula.              Dr. Susana Arvizu.     5. Dementia              2012: Diagnosed with Alzheimer's Dementia.              Now advanced.              Dr. Dereck Patel.     6. Hyperparathyroidism              2010: Diagnosed.     7. Hypothyroidism              1990: Diagnosed.              On levothyroxine 150 mcg Q24.     8. Primary Care              Dr. Susana Arvizu.        VTE Risk Mitigation         Ordered     heparin (porcine) injection 5,000 Units  Every 8 hours     Route:  Subcutaneous        04/18/17 1717     Medium Risk of VTE  Once      04/18/17 1717     Place sequential compression device  Until discontinued      04/18/17 1717     Place BARBARA hose  Until discontinued      04/18/17 1717          Adrien Lazo MD  Cardiology  [facility]

## 2017-04-21 NOTE — ASSESSMENT & PLAN NOTE
- ? 2.2 calcitonin dose increase at [facility]   - 12.9 today   - IVF's, was given 1 L in ED  - monitor with AM labs   - Nephrology consulted   hold calcitriol   continue sensipar dose at 60 BID 2/2 nausea at high doses   Nasal Calcitonin   Continue IVF

## 2017-04-21 NOTE — PLAN OF CARE
Problem: Physical Therapy Goal  Goal: Physical Therapy Goal  Goals to be met by: 5/15/17    Patient will increase functional independence with mobility by performin. Supine > sit with supervision.   2. Sit > supine with supervision.   3. Sit <> stand transfer with supervision with rolling walker.   4. Gait > 50 feet with CGA with rolling walker.   5. Ascend/descend curb step with CGA with rolling walker.    Outcome: Ongoing (interventions implemented as appropriate)  Patient progressing slowly towards goals. She continues to have impaired cognition affecting her ability to follow commands during mobility. She also has impaired sitting balance and posture. She requires total assist for sit<>stand transfers, although she is unable to stand fully or safely perform pivot transfer to the chair today. She has yet to ambulate this admission. She is a very high fall risk. Will continue to follow and progress as tolerated. Please see progress note for detailed plan of care and recommendations including SNF.

## 2017-04-21 NOTE — PROGRESS NOTES
Remains free from fall, injury, and skin breakdown. Voided spontaneously per bedpan; Incontinence care performed PRN. Elevated BP noted at this shift; otherwise stable on RA and afebrile. Telemetry maintained. Tolerating Q2H turning schedule. TEDs/SCDs maintained. Assisted patient with meals. Tolerating ordered diet. Pt able to take PO pills. IV fluids maintained per orders. IV site WNL. Plan of care reviewed with daughter and all questions answered. Bed low, locked w/ bed alarm on. Call light within reach. Purposeful rounding performed. No other complaints at this time.

## 2017-04-21 NOTE — ASSESSMENT & PLAN NOTE
- on amlodipine 2.5 QD & Lisinopril 20 mg QD   - hydralazine PRN  - pressures mildly elevated   - no PRN hydralazine today  - monitor

## 2017-04-21 NOTE — PROGRESS NOTES
[facility]  Cardiology  Progress Note    Patient Name: Mary Singleton  MRN: [id number]  Admission Date: 4/18/2017  Hospital Length of Stay: 3 days  Code Status: Full Code   Attending Physician: Rene Smith MD   Primary Care Physician: Susana Arvizu MD  Expected Discharge Date:   Principal Problem:Hypercalcemia    Subjective:     Brief HPI:    Mary Singleton is a 89 y.o. female with hypertension. She has severe carotid disease having undergone left CEA in 2002. She has late onset quite advanced Alzheimer's dementia. She has hypothyroidism, hyperparathyroidism and stage four chronic kidney disease. She has a right arm fistula that was briefly used a few months ago.     She presents with further changes in mental status and is found to be hypercalcemic. She is on telemetry and today had episodes of Second degree AV block, Mobitz 1 with 2:1 AV conduction with a heart rate in 40s. She is currently bedridden and not able to give any history.     Hospital Course:     Supportive care.    On telemetry.    Interval History:     Mental status has improved. Still confused. No distress. No further bradycardia.    Review of Systems   Cardiovascular: Negative for chest pain and syncope.   Respiratory: Negative for cough and shortness of breath.      Objective:     Vital Signs (Most Recent):  Temp: 97.9 °F (36.6 °C) (04/21/17 0730)  Pulse: 88 (04/21/17 0730)  Resp: 18 (04/21/17 0730)  BP: (!) 169/81 (04/21/17 0730)  SpO2: 97 % (04/21/17 0730) Vital Signs (24h Range):  Temp:  [97.7 °F (36.5 °C)-98.7 °F (37.1 °C)] 97.9 °F (36.6 °C)  Pulse:  [66-97] 88  Resp:  [18] 18  SpO2:  [93 %-98 %] 97 %  BP: (133-172)/(65-81) 169/81     Weight: 56.7 kg (125 lb)  Body mass index is 21.46 kg/(m^2).    SpO2: 97 %  O2 Device (Oxygen Therapy): room air      Intake/Output Summary (Last 24 hours) at 04/21/17 0815  Last data filed at 04/21/17 0545   Gross per 24 hour   Intake             3334 ml   Output               50 ml    Net             3284 ml       Lines/Drains/Airways     Drain                 Hemodialysis AV Fistula Right forearm -- days          Peripheral Intravenous Line                 Peripheral IV - Single Lumen 04/20/17 0912 Left Wrist less than 1 day                Physical Exam   Constitutional: She appears well-developed. She appears ill. No distress.   HENT:   Head: Normocephalic and atraumatic.   Nose: Nose normal.   Neck: No JVD present.   Cardiovascular: Normal rate, regular rhythm, S1 normal and S2 normal.  Exam reveals gallop and S4.    Pulmonary/Chest: Effort normal and breath sounds normal.   Abdominal: Normal appearance. There is no tenderness.   Musculoskeletal:        Right ankle: She exhibits no swelling.        Left ankle: She exhibits no swelling.   Neurological: She is disoriented.   Skin: Skin is warm and dry. No rash noted.     Current Medications:     amlodipine  10 mg Oral Daily    aspirin  81 mg Oral Daily    bisacodyl  10 mg Oral Once    calcitonin (salmon)  1 spray Alternating Nares Daily    cinacalcet  60 mg Oral BID WM    heparin (porcine)  5,000 Units Subcutaneous Q8H    levothyroxine  150 mcg Oral Before breakfast     Current Laboratory Results:    Recent Results (from the past 24 hour(s))   2D echo with color flow doppler    Collection Time: 04/20/17  9:15 AM   Result Value Ref Range    EF 70 55 - 65    Diastolic Dysfunction No     Aortic Valve Stenosis TRIVIAL     Est. PA Systolic Pressure 30.46     Pericardial Effusion NONE     Tricuspid Valve Regurgitation MILD    Calcium, ionized    Collection Time: 04/21/17  5:31 AM   Result Value Ref Range    Calcium, Ion 1.53 (H) 1.06 - 1.42 mmol/L     Current Imaging Results:    Imaging Results     None          Assessment and Plan:     Problem List:    Active Diagnoses:    Diagnosis Date Noted POA    PRINCIPAL PROBLEM:  Hypercalcemia [E83.52] 03/06/2017 Yes    Chronic kidney disease, stage IV (severe) [N18.4] 07/25/2013 Yes    Dementia  [F03.90] 11/02/2016 Yes    Hypertension [I10] 07/25/2013 Yes    Hyperparathyroidism [E21.3] 07/25/2013 Yes    Mobitz type 1 second degree atrioventricular block [I44.1] 04/20/2017 Yes    Constipation [K59.00] 04/19/2017 Yes    Hypernatremia [E87.0] 04/18/2017 Yes    LLOYD (acute kidney injury) [N17.9] 04/18/2017 Yes    Generalized weakness [R53.1] 03/06/2017 Yes      Problems Resolved During this Admission:    Diagnosis Date Noted Date Resolved POA     Assessment and Plan:    1. Atrioventricular Block, Second Degree, Mobitz 1               4/19/2017: With 2:1 AV block.              Stopped donepezil but doubt cause.              A pacemaker would be reasonable with this degree of conduction disease.              However she has multiple medical issues including advanced dementia.              Discussed with family.              A reasonable approach may be to follow her and if her mental status improves and she is no longer bedridden consider a pacemaker at that time.     2. Carotid Artery Stenosis              2002: Left CEA.              10/26/2016: Carotid Duplex: GABBIE: Moderate plaquing - 1.9 m/s - 60-70%. LCCA: Proximal 1.7 m/s - 50-70%. LICA: Moderate plaquing - <50%.              See no need for further follow up at this age with dementia.              On aspirin 81 mg Q24.              Stable.     3. Hypertension              2000: Diagnosed.              On amlodipine 2.5 mg Q24 and lisinopril 20 mg Q24.     4. Chronic Kidney Disease, Stage 4              11/15/2012: BUN/crea 38/2.1. CrCl 22.              Right arm fistula.              Dr. Susana Arvizu.     5. Dementia              2012: Diagnosed with Alzheimer's Dementia.              Now advanced.              Dr. Dereck Patel.     6. Hyperparathyroidism              2010: Diagnosed.     7. Hypothyroidism              1990: Diagnosed.              On levothyroxine 150 mcg Q24.     8. Primary Care              Dr. Susana Arvizu.        VTE Risk Mitigation          Ordered     heparin (porcine) injection 5,000 Units  Every 8 hours     Route:  Subcutaneous        04/18/17 1717     Medium Risk of VTE  Once      04/18/17 1717     Place sequential compression device  Until discontinued      04/18/17 1717     Place BARBARA hose  Until discontinued      04/18/17 1717          Adrien Lazo MD  Cardiology  [facility]

## 2017-04-21 NOTE — PROGRESS NOTES
"Nephrology  Progress Note    Admit Date: 4/18/2017   LOS: 3 days     SUBJECTIVE:     Follow-up For:  Hypercalcemia    Interval History:     More alert this am but remains confused.  Grandson at bedside.  Ionized ca improving.  Tolerating diet and pills better. Denies pain.    Review of Systems:    AMS but NAD    OBJECTIVE:     Vital Signs Range (Last 24H):  BP (!) 169/81 (BP Location: Left arm, Patient Position: Lying, BP Method: Automatic)  Pulse 88  Temp 97.9 °F (36.6 °C) (Axillary)   Resp 18  Ht 5' 4" (1.626 m)  Wt 56.7 kg (125 lb)  SpO2 97%  Breastfeeding? No  BMI 21.46 kg/m2    Temp:  [97.7 °F (36.5 °C)-98.7 °F (37.1 °C)]   Pulse:  [66-97]   Resp:  [18]   BP: (133-172)/(65-81)   SpO2:  [93 %-98 %]     I & O (Last 24H):    Intake/Output Summary (Last 24 hours) at 04/21/17 0834  Last data filed at 04/21/17 0545   Gross per 24 hour   Intake             3334 ml   Output               50 ml   Net             3284 ml       Physical Exam:  General appearance: elderly and ill appearing.  More alert but confused.  Eyes:  Conjunctivae/corneas clear. PERRL.  Lungs: Normal respiratory effort,   clear to auscultation bilaterally   Heart: Irregular/Irregular rate and rhythm, S1, S2 normal, no murmur, rub or julio c.  Abdomen: Soft, non-tender non-distended; bowel sounds normal; no masses,  no organomegaly  Extremities: No cyanosis or clubbing. No edema.    Skin: Skin color, texture, turgor normal. No rashes or lesions  Neurologic: No focal numbness or weakness   Right Arm AVF:  +    Laboratory Data:  No results for input(s): WBC, RBC, HGB, HCT, PLT, MCV, MCH, MCHC in the last 24 hours.    BMP:     Recent Labs  Lab 04/19/17  0502 04/20/17  0602   GLU 83 80   * 146*   K 3.3* 3.4*    110   CO2 25 24   BUN 75* 63*   CREATININE 3.5* 3.5*   CALCIUM 13.8* 12.9*   MG 1.9  --      Lab Results   Component Value Date    .1 (H) 04/18/2017    CALCIUM 12.9 (HH) 04/20/2017    CAION 1.53 (H) 04/21/2017    PHOS 3.9 " 04/19/2017             Medications:  Medication list was reviewed and changes noted under Assessment/Plan.    Diagnostic Results:        ASSESSMENT/PLAN:     89 YO WF with LLOYD on CKD4B, Alzheimer's dementia, primary hyperparathyroidism with symptomatic hypercalcemia and volume depletion.        1. Hypercalcemia: Slowly resolving.  Holding Calcitriol/D3.  Using nasal calcitonin until pt able to tolerate sensipar on a regular basis.  2. LLOYD on CKD4B: Likely due to volume depletion. No indication for HD yet. Had recent admission during which she required temporary HD, but she has since stablized and not required HD. She has an intact AVF. Labs pending this am.  3. HTN: Restarted oral Norvasc but using hydralazine PRN.   4. Alz Dementia: Exacerbated by hypercalcemia. Holding meds for now. Place all four rails up to keep her free of falls. Encourage family to keep someone at bedside for redirection and ensure she doesn't get out of bed.    5. Hypothyroidism: On synthroid.  TSH/T4 wnl.  6. 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 Lazo following.  Considering Pacer.   7. Hypernatremia (E87.0):  Adjusted IVF's.    8. Mild hypokalemia (E87.6):  Replace and monitor.    9. H/O CHF: seems compensated at present but will decrease volume of IVF's.           See above orders/recs.     Addendum: pt was very sleepy earlier and did in fact rest. Revisted this pm and now awake and cooperative. Have asked therapy to try to revisit

## 2017-04-21 NOTE — PLAN OF CARE
CM sent packet to [facility]. Pt was in that facility recently and would like to return.  Packet sent thru [facility], for probable discharge mon, 4/24/17.

## 2017-04-21 NOTE — PLAN OF CARE
Problem: Occupational Therapy Goal  Goal: Occupational Therapy Goal  Goals to be met by: 4/30/2017    Patient will increase functional independence with ADLs by performing:    Feeding with Stand-by Assistance.  Grooming while seated with Stand-by Assistance.  Toileting from bedside commode with Moderate Assistance for hygiene and clothing management.   Rolling to Bilateral with Contact Guard Assistance.   Supine to sit with Minimal Assistance.  Toilet transfer to bedside commode with Moderate Assistance.  Increased functional strength to 4-/5 for RUE; LUE elbow and hand 4-/5.  Upper extremity exercise program 10 reps per handout, with assistance as needed.   Outcome: Ongoing (interventions implemented as appropriate)  Pt presented with poor and inconsistent responses to verbal cues with some responsiveness to tactile cues. Pt required Moderate Assistance for supine<>sit with tactile cues to initiate movement. Pt sat EOB x5 minutes with fluctuating SBA-CGA to maintain posture. Pt washed her face and hands with tactile and verbal cues to initiate and continue activity.  Pt would benefit from continued skilled OT services to improve safety and independence in ADLs.       GLADIS Woods

## 2017-04-21 NOTE — PT/OT/SLP PROGRESS
"Physical Therapy  Treatment    Mary Singleton   MRN: [id number]   Admitting Diagnosis: Hypercalcemia    PT Received On: 17  PT Start Time: 1036     PT Stop Time: 1056    PT Total Time (min): 20 min       Billable Minutes:  Therapeutic Activity 20    Treatment Type: Treatment  PT/PTA: PT     PTA Visit Number: 0       General Precautions: Standard, fall (ambulate with assist)  Orthopedic Precautions: N/A   Braces: N/A    Do you have any cultural, spiritual, Latter-day conflicts, given your current situation?: Denominational per chart    Subjective:  Communicated with nurse prior to session.  Pt minimally conversant. Responding verbally to questions <25% of the time, with impaired clarity of speech. When asked orientation to place, pt stated "in the bed."    Pain Ratin/10  Pain Rating Post-Intervention: 0/10    Objective:    Pt found supine in bed with HOB elevated. Daughter present.     Functional Mobility:  Bed Mobility:   Supine to Sit: Total Assistance, With side rail  Sit to Supine: Total Assistance, With siderail    Transfers:  Sit <> Stand Assistance: Total Assistance (x 3 trials, hips and knees remained flexed, did not achieve full upright posture)  Sit <> Stand Assistive Device: No Assistive Device (bilateral UE support on bedrails)    Gait:   Gait Distance: Unable to perform      Balance:   Static Sit: POOR: Needs MODERATE assist to maintain with tendency for R lateral lean; severe thoracic kyphosis   Static Stand: 0: Needs MAXIMAL assist to maintain         AM-PAC 6 CLICK MOBILITY  How much help from another person does this patient currently need?   1 = Unable, Total/Dependent Assistance  2 = A lot, Maximum/Moderate Assistance  3 = A little, Minimum/Contact Guard/Supervision  4 = None, Modified [place]/Independent    Turning over in bed (including adjusting bedclothes, sheets and blankets)?: 2  Sitting down on and standing up from a chair with arms (e.g., wheelchair, bedside commode, etc.): " 1  Moving from lying on back to sitting on the side of the bed?: 1  Moving to and from a bed to a chair (including a wheelchair)?: 1  Need to walk in hospital room?: 1  Climbing 3-5 steps with a railing?: 1  Total Score: 7    AM-PAC Raw Score CMS G-Code Modifier Level of Impairment Assistance   6 % Total / Unable   7 - 9 CM 80 - 100% Maximal Assist   10 - 14 CL 60 - 80% Moderate Assist   15 - 19 CK 40 - 60% Moderate Assist   20 - 22 CJ 20 - 40% Minimal Assist   23 CI 1-20% SBA / CGA   24 CH 0% Independent/ Mod I     Patient left supine with all lines intact, call button in reach, bed alarm on and nurse notified. Family stepped out during session.     Assessment:  Mary Singleton is a 89 y.o. female with a medical diagnosis of Hypercalcemia. Patient progressing slowly towards goals. She continues to have impaired cognition affecting her ability to follow commands during mobility. She also has impaired sitting balance and posture. She requires total assist for sit<>stand transfers, although she is unable to stand fully or safely perform pivot transfer to the chair today. She has yet to ambulate this admission. She is a very high fall risk. Will continue to follow and progress as tolerated. Please see progress note for detailed plan of care and recommendations including SNF. Pt has decreased independence with functional mobility presenting with below impairments. Pt would benefit from continued skilled PT to maximize independence and safety with functional mobility.      Rehab identified problem list/impairments: Rehab identified problem list/impairments: weakness, impaired endurance, impaired self care skills, impaired functional mobilty, impaired cognition, pain, decreased safety awareness, impaired balance, decreased lower extremity function, gait instability    Rehab potential is good.    Activity tolerance: Fair    Discharge recommendations: Discharge Facility/Level Of Care Needs: nursing facility,  skilled (This is also daughter's preference)     Barriers to discharge: Barriers to Discharge: Decreased caregiver support (At baseline patient lives alone, she recently has been receiving more assistance from family and after recent previous SNF stay has begun to stay with daughter for assistance)    Equipment recommendations:       GOALS:   Physical Therapy Goals        Problem: Physical Therapy Goal    Goal Priority Disciplines Outcome Goal Variances Interventions   Physical Therapy Goal     PT/OT, PT Ongoing (interventions implemented as appropriate)     Description:  Goals to be met by: 5/15/17    Patient will increase functional independence with mobility by performin. Supine > sit with supervision.   2. Sit > supine with supervision.   3. Sit <> stand transfer with supervision with rolling walker.   4. Gait > 50 feet with CGA with rolling walker.   5. Ascend/descend curb step with CGA with rolling walker.                 PLAN:    Patient to be seen 6 x/week  to address the above listed problems via therapeutic activities, therapeutic exercises, gait training, neuromuscular re-education  Plan of Care expires: 17  Plan of Care reviewed with: patient, daughter         Justina Koch, PT  2017

## 2017-04-21 NOTE — SUBJECTIVE & OBJECTIVE
Interval History: Patient new to me. Unable to provide history, mutters incomprehensible words. Son/daughter at bedside note she is improved, but not yet back to baseline.     Review of Systems   Unable to perform ROS: Mental status change     Objective:     Vital Signs (Most Recent):  Temp: 98.7 °F (37.1 °C) (04/20/17 1559)  Pulse: 91 (04/20/17 1800)  Resp: 18 (04/20/17 1559)  BP: 133/65 (04/20/17 1559)  SpO2: 97 % (04/20/17 1559) Vital Signs (24h Range):  Temp:  [97.7 °F (36.5 °C)-98.7 °F (37.1 °C)] 98.7 °F (37.1 °C)  Pulse:  [79-97] 91  Resp:  [18] 18  SpO2:  [94 %-97 %] 97 %  BP: (133-197)/(65-86) 133/65     Weight: 56.7 kg (125 lb)  Body mass index is 21.46 kg/(m^2).    Intake/Output Summary (Last 24 hours) at 04/20/17 2212  Last data filed at 04/20/17 1909   Gross per 24 hour   Intake             1837 ml   Output               50 ml   Net             1787 ml      Physical Exam   Constitutional: She is oriented to person, place, and time. She appears well-developed and well-nourished. No distress.   Sleeping, responds to name.    HENT:   Head: Normocephalic and atraumatic.   Eyes: Conjunctivae and EOM are normal. Pupils are equal, round, and reactive to light. No scleral icterus.   Neck: Normal range of motion. Neck supple. No tracheal deviation present.   Cardiovascular: Normal rate, regular rhythm, normal heart sounds and intact distal pulses.  Exam reveals no gallop and no friction rub.    No murmur heard.  Pulmonary/Chest: Effort normal and breath sounds normal. No stridor. No respiratory distress. She has no wheezes. She has no rales.   Abdominal: Soft. Bowel sounds are normal. She exhibits no distension. There is no tenderness.   Musculoskeletal: Normal range of motion. She exhibits no deformity.   Neurological: She is alert and oriented to person, place, and time. She exhibits normal muscle tone.   Cranial nerves grossly intact.    Skin: Skin is warm and dry. No rash noted. She is not diaphoretic. No  erythema. No pallor.   Psychiatric: She has a normal mood and affect. Her behavior is normal. Judgment and thought content normal.   Nursing note and vitals reviewed.      Significant Labs:   CMP:   Recent Labs  Lab 04/19/17  0502 04/20/17  0602   * 146*   K 3.3* 3.4*    110   CO2 25 24   GLU 83 80   BUN 75* 63*   CREATININE 3.5* 3.5*   CALCIUM 13.8* 12.9*   ANIONGAP 13 12   EGFRNONAA 11* 11*     All pertinent labs within the past 24 hours have been reviewed.    Significant Imaging: I have reviewed all pertinent imaging results/findings within the past 24 hours.

## 2017-04-21 NOTE — SUBJECTIVE & OBJECTIVE
Interval History: Uneventful overnight. No new concerns today. Daughter at bedside, said tolerated liquid diet well today, and slightly more alert. Aware placement into SNF likely to be on Monday.     Review of Systems   Unable to perform ROS: Mental status change     Objective:     Vital Signs (Most Recent):  Temp: 98 °F (36.7 °C) (04/21/17 1626)  Pulse: 103 (04/21/17 1626)  Resp: 18 (04/21/17 1626)  BP: (!) 161/69 (04/21/17 1626)  SpO2: 96 % (04/21/17 1626) Vital Signs (24h Range):  Temp:  [97.3 °F (36.3 °C)-98.5 °F (36.9 °C)] 98 °F (36.7 °C)  Pulse:  [] 103  Resp:  [18] 18  SpO2:  [93 %-98 %] 96 %  BP: (145-172)/(67-81) 161/69     Weight: 56.7 kg (125 lb)  Body mass index is 21.46 kg/(m^2).    Intake/Output Summary (Last 24 hours) at 04/21/17 1645  Last data filed at 04/21/17 1625   Gross per 24 hour   Intake             3555 ml   Output                0 ml   Net             3555 ml      Physical Exam   Constitutional: She is oriented to person, place, and time. She appears well-developed and well-nourished. No distress.   Awake today, responds minimally, tracks with eyes.    HENT:   Head: Normocephalic and atraumatic.   Eyes: Conjunctivae and EOM are normal. Pupils are equal, round, and reactive to light. No scleral icterus.   Neck: Normal range of motion. Neck supple. No tracheal deviation present.   Cardiovascular: Normal rate, regular rhythm, normal heart sounds and intact distal pulses.  Exam reveals no gallop and no friction rub.    No murmur heard.  Pulmonary/Chest: Effort normal and breath sounds normal. No stridor. No respiratory distress. She has no wheezes. She has no rales.   Abdominal: Soft. Bowel sounds are normal. She exhibits no distension. There is no tenderness.   Musculoskeletal: Normal range of motion. She exhibits no deformity.   Neurological: She is alert and oriented to person, place, and time. She exhibits normal muscle tone.   Cranial nerves grossly intact.    Skin: Skin is warm and  dry. No rash noted. She is not diaphoretic. No erythema. No pallor.   Psychiatric: She has a normal mood and affect. Her behavior is normal. Judgment and thought content normal.   Nursing note and vitals reviewed.      Significant Labs:   BMP:   Recent Labs  Lab 04/21/17  0531   *      K 3.2*      CO2 24   BUN 54*   CREATININE 3.2*   CALCIUM 11.2*     CBC: No results for input(s): WBC, HGB, HCT, PLT in the last 48 hours.  CMP:   Recent Labs  Lab 04/20/17  0602 04/21/17  0531   * 142   K 3.4* 3.2*    109   CO2 24 24   GLU 80 119*   BUN 63* 54*   CREATININE 3.5* 3.2*   CALCIUM 12.9* 11.2*   ANIONGAP 12 9   EGFRNONAA 11* 12*     All pertinent labs within the past 24 hours have been reviewed.    Significant Imaging: I have reviewed all pertinent imaging results/findings within the past 24 hours.

## 2017-04-21 NOTE — PT/OT/SLP PROGRESS
"Occupational Therapy  Treatment    Mary Singleton   MRN: [id number]   Admitting Diagnosis: Hypercalcemia    OT Date of Treatment: 04/21/17   OT Start Time: 1613  OT Stop Time: 1630  OT Total Time (min): 17 min    Billable Minutes:  Therapeutic Activity 17    General Precautions: Standard, fall  Orthopedic Precautions: N/A  Braces: N/A    Do you have any cultural, spiritual, Presybeterian conflicts, given your current situation?: Anabaptist    Subjective:  Pt arousable this attempt. Pt agreeable to therapy.      Pain Rating:  (pt stated "ow" with any tactile stimulus, reported tooth pain but unable to describe or give a number)     Objective:  Patient found with: peripheral IV, SCD, telemetry     Functional Mobility:  Bed Mobility:  Scooting/Bridging: Total Assistance (for scooting to EOB and to HOB)  Supine to Sit: Moderate Assistance  Sit to Supine: Maximum Assistance (for legs and aligning shoulders)    Transfers:    None    Functional Ambulation: None    Activities of Daily Living:    Grooming Position: EOB (wash hands and face with washcloth)  Grooming Level of Assistance: Moderate assistance (Max verbal and tactile cues, [place] for initiation of task)       Balance:   Static Sit: POOR+: Needs MINIMAL assist to maintain (required weight bearing on elbows, with SBA-CGA to maintain, maintained for approximately 5 minutes, lost sitting balance and was returned to bed)  Dynamic Sit: FAIR: Cannot move trunk without losing balance  Static Stand: NT  Dynamic stand: NT    Therapeutic Activities and Exercises:  Transfers, sitting balance and tolerance, bed mobility    AM-PAC 6 CLICK ADL   How much help from another person does this patient currently need?   1 = Unable, Total/Dependent Assistance  2 = A lot, Maximum/Moderate Assistance  3 = A little, Minimum/Contact Guard/Supervision  4 = None, Modified [place]/Independent    Putting on and taking off regular lower body clothing? : 1  Bathing (including washing, rinsing, " drying)?: 1  Toileting, which includes using toilet, bedpan, or urinal? : 1  Putting on and taking off regular upper body clothing?: 1  Taking care of personal grooming such as brushing teeth?: 1  Eating meals?: 1  Total Score: 6     AM-PAC Raw Score CMS G-Code Modifier Level of Impairment Assistance   6 % Total / Unable   7 - 9 CM 80 - 100% Maximal Assist   10 - 14 CL 60 - 80% Moderate Assist   15 - 19 CK 40 - 60% Moderate Assist   20 - 22 CJ 20 - 40% Minimal Assist   23 CI 1-20% SBA / CGA   24 CH 0% Independent/ Mod I     Patient left supine with all lines intact, call button in reach and bed alarm on    ASSESSMENT:  Mary Singleton is a 89 y.o. female with a medical diagnosis of Hypercalcemia and presents with hypersensitivity to tactile stimuli, weakness, impaired endurance, and impaired cognition affecting performance. Pt presented with poor and inconsistent responses to verbal cues with some responsiveness to tactile cues. Pt required Moderate Assistance for supine<>sit with tactile cues to initiate movement. Pt sat EOB x5 minutes with fluctuating SBA-CGA to maintain posture. Pt washed her face and hands with tactile and verbal cues to initiate and continue activity. Pt would benefit from continued skilled OT services to improve safety and independence in ADLs.      Rehab identified problem list/impairments: Rehab identified problem list/impairments:  (Functional deficits impairing performance: hard of hearing, hypersensitivity to tactile stimuli, weakness, impaired endurance, impaired cognition)    Rehab potential is fair.    Activity tolerance: Fair    Discharge recommendations: Discharge Facility/Level Of Care Needs: nursing facility, skilled     Barriers to discharge: Barriers to Discharge: Decreased caregiver support    Equipment recommendations:  (TBD)     GOALS:   Occupational Therapy Goals        Problem: Occupational Therapy Goal    Goal Priority Disciplines Outcome Interventions    Occupational Therapy Goal     OT, PT/OT Ongoing (interventions implemented as appropriate)    Description:  Goals to be met by: 4/30/2017    Patient will increase functional independence with ADLs by performing:    Feeding with Stand-by Assistance.  Grooming while seated with Stand-by Assistance.  Toileting from bedside commode with Moderate Assistance for hygiene and clothing management.   Rolling to Bilateral with Contact Guard Assistance.   Supine to sit with Minimal Assistance.  Toilet transfer to bedside commode with Moderate Assistance.  Increased functional strength to 4-/5 for RUE; LUE elbow and hand 4-/5.  Upper extremity exercise program 10 reps per handout, with assistance as needed.                Plan:  Patient to be seen 6 x/week to address the above listed problems via self-care/home management, therapeutic activities, therapeutic exercises  Plan of Care expires: 05/20/17  Plan of Care reviewed with: patient, daughter         Paloma Hernandez, SOT  04/21/2017

## 2017-04-21 NOTE — ASSESSMENT & PLAN NOTE
- on amlodipine 2.5 QD & Lisinopril 20 mg QD   - hydralazine PRN  - pressures generally elevated   - has needed hydralazine once each of past 2 days  - monitor

## 2017-04-21 NOTE — ASSESSMENT & PLAN NOTE
- patient has had one round of HD, Renal fxn had stabilized and HD is on hold per family  - Cr now 3.2

## 2017-04-21 NOTE — ASSESSMENT & PLAN NOTE
- patient has had one round of HD , Renal fxn had stabilized and HD is on hold per family  - Cr stable at 3.5

## 2017-04-21 NOTE — PLAN OF CARE
CM met with pt and daughter Bill, [phone], to discuss discharge plan. Daughter states  stated she would likely need SNF. Pt has been in [facility] in the past and would like to return there.  CM to assist with discharge plan.     04/21/17 1526   Discharge Assessment   Assessment Type Discharge Planning Reassessment   Assessment information obtained from? Patient;Caregiver;Medical Record   Expected Length of Stay (days) 3   Communicated expected length of stay with patient/caregiver yes   Type of Healthcare Directive Received Durable power of  for health care   If Healthcare Directive is received, is it scanned into Epic? yes   Prior to hospitalization functional status: Independent;Needs Assistance;Assistive Equipment   Current cognitive status: Alert/Oriented   Current Functional Status: Partially Dependent   Arrived From home or self-care;home health   Lives With child(amira), adult   Able to Return to Prior Arrangements unable to determine at this time (comments)   Is patient able to care for self after discharge? No  (pt needs SNF)   How many people do you have in your home that can help with your care after discharge? 1   Patient's perception of discharge disposition skilled nursing facility   Patient currently being followed by outpatient case management? No   Patient currently receives home health services? Yes   Patient previously received home health services and would like to resume services if necessary? Yes  (after SNF)   Transportation Available family or friend will provide   On Dialysis? No   Does the patient receive services at the [facility]? No   Are there any open cases? No   Discharge Plan A Skilled Nursing Facility   Discharge Plan B Skilled Nursing Facility   Patient/Family In Agreement With Plan yes

## 2017-04-21 NOTE — NURSING
No significant events overnight. Remains free from fall, injury, and skin breakdown. Incontinence care performed as needed throughout shift. VSS on RA throughout the night. Denies pain. Tele maintained; all alarms active and audible. TEDs/SCDs in place. Plan of care reviewed with patient and all questions answered. Bed low, locked w/ bed alarm on. Call light within reach. Purposeful rounding performed. Resting comfortably in bed, family at the bedside, no other complaints at this time.

## 2017-04-21 NOTE — PROGRESS NOTES
[facility] Hospital Medicine  Progress Note    Patient Name: Mary Singleton  MRN: [id number]  Patient Class: IP- Inpatient   Admission Date: 4/18/2017  Length of Stay: 2 days  Attending Physician: Rene Smith MD  Primary Care Provider: Susana Arvizu MD        Subjective:     Principal Problem:Hypercalcemia    HPI:  89 y/o female with Hx of 1 HPTH s/p one parathyroid removal, CKDIV, HTN, hypothyroidism, Dementia sent by home health after her calcium levels from her blood work done yesterday was too high. Per family patient has increased weakness, confusion, decreased appetite. She has a Hx of similar hypercalcemia and gets drowsy and fatigued each time. Her daughter denies that she has had any fever, CP, SOB, nausea and vomiting.  Currently no on hemodialysis.     Hospital Course:  Patient appeared to be volume depleated with elevated calcium and renal insufficiency upon admission and was started in IV fluids. Doses were adjusted to account for CHF. Repletion of lytes begun upon admission and is ongoing as per needs indicated by daily labs. On 4/19/2017 she began to develop heart block while being monitored on telemetry. Cardiology was consulted. Pacemaker suggested when mental status improves and no longer bed ridden.    Interval History: Patient new to me. Unable to provide history, mutters incomprehensible words. Son/daughter at bedside note she is improved, but not yet back to baseline.     Review of Systems   Unable to perform ROS: Mental status change     Objective:     Vital Signs (Most Recent):  Temp: 98.7 °F (37.1 °C) (04/20/17 1559)  Pulse: 91 (04/20/17 1800)  Resp: 18 (04/20/17 1559)  BP: 133/65 (04/20/17 1559)  SpO2: 97 % (04/20/17 1559) Vital Signs (24h Range):  Temp:  [97.7 °F (36.5 °C)-98.7 °F (37.1 °C)] 98.7 °F (37.1 °C)  Pulse:  [79-97] 91  Resp:  [18] 18  SpO2:  [94 %-97 %] 97 %  BP: (133-197)/(65-86) 133/65     Weight: 56.7 kg (125 lb)  Body mass index is 21.46  kg/(m^2).    Intake/Output Summary (Last 24 hours) at 04/20/17 2212  Last data filed at 04/20/17 1909   Gross per 24 hour   Intake             1837 ml   Output               50 ml   Net             1787 ml      Physical Exam   Constitutional: She is oriented to person, place, and time. She appears well-developed and well-nourished. No distress.   Sleeping, responds to name.    HENT:   Head: Normocephalic and atraumatic.   Eyes: Conjunctivae and EOM are normal. Pupils are equal, round, and reactive to light. No scleral icterus.   Neck: Normal range of motion. Neck supple. No tracheal deviation present.   Cardiovascular: Normal rate, regular rhythm, normal heart sounds and intact distal pulses.  Exam reveals no gallop and no friction rub.    No murmur heard.  Pulmonary/Chest: Effort normal and breath sounds normal. No stridor. No respiratory distress. She has no wheezes. She has no rales.   Abdominal: Soft. Bowel sounds are normal. She exhibits no distension. There is no tenderness.   Musculoskeletal: Normal range of motion. She exhibits no deformity.   Neurological: She is alert and oriented to person, place, and time. She exhibits normal muscle tone.   Cranial nerves grossly intact.    Skin: Skin is warm and dry. No rash noted. She is not diaphoretic. No erythema. No pallor.   Psychiatric: She has a normal mood and affect. Her behavior is normal. Judgment and thought content normal.   Nursing note and vitals reviewed.      Significant Labs:   CMP:   Recent Labs  Lab 04/19/17  0502 04/20/17  0602   * 146*   K 3.3* 3.4*    110   CO2 25 24   GLU 83 80   BUN 75* 63*   CREATININE 3.5* 3.5*   CALCIUM 13.8* 12.9*   ANIONGAP 13 12   EGFRNONAA 11* 11*     All pertinent labs within the past 24 hours have been reviewed.    Significant Imaging: I have reviewed all pertinent imaging results/findings within the past 24 hours.    Assessment/Plan:      * Hypercalcemia  - ? 2.2 calcitonin dose increase at [facility]   - 12.9  today   - IVF's, was given 1 L in ED  - monitor with AM labs   - Nephrology consulted   hold calcitriol   continue sensipar dose at 60 BID 2/2 nausea at high doses   Nasal Calcitonin   Continue IVF      Hypertension  - on amlodipine 2.5 QD & Lisinopril 20 mg QD   - hydralazine PRN  - pressures generally elevated   - has needed hydralazine once each of past 2 days  - monitor    Hyperparathyroidism  - currently on sensipar  - calcitriol being hold      Chronic kidney disease, stage IV (severe)  - patient has had one round of HD , Renal fxn had stabilized and HD is on hold per family  - Cr stable at 3.5       Dementia  - 2/2 Alzheimers   - worsened 2/2 hypercalcemia  - continue home meds        Generalized weakness  - PT/ OT consulted       Hypernatremia  - 2/2 volume depletion  - IVF's  - monitor      LLOYD (acute kidney injury)  - continue reduced dose of IVF's  - monitor   - 2/2 volume depletion  - no need for HD according to nephrology         Constipation  - bisacodyl tablet ordered       Mobitz type 1 second degree atrioventricular block  - Seen by Cardiology (Dr. Lazo): consider pacer when mental statues improves and not bedridden       VTE Risk Mitigation         Ordered     heparin (porcine) injection 5,000 Units  Every 8 hours     Route:  Subcutaneous        04/18/17 1717     Medium Risk of VTE  Once      04/18/17 1717     Place sequential compression device  Until discontinued      04/18/17 1717     Place BARBARA hose  Until discontinued      04/18/17 1717          Sheryl Galo PA-C  Department of Hospital Medicine   [facility] [place]

## 2017-04-21 NOTE — ASSESSMENT & PLAN NOTE
- ? 2.2 calcitonin dose increase at [facility]   - 11.2 today   - IVF's, was given 1 L in ED  - monitor with AM labs   - Nephrology consulted   hold calcitriol   continue sensipar dose at 60 BID 2/2 nausea at high doses   Nasal Calcitonin   Continue IVF

## 2017-04-21 NOTE — PROGRESS NOTES
[facility] Hospital Medicine  Progress Note    Patient Name: Mary Singleton  MRN: [id number]  Patient Class: IP- Inpatient   Admission Date: 4/18/2017  Length of Stay: 3 days  Attending Physician: Rene Smith MD  Primary Care Provider: Susana Arvizu MD        Subjective:     Principal Problem:Hypercalcemia    HPI:  89 y/o female with Hx of 1 HPTH s/p one parathyroid removal, CKDIV, HTN, hypothyroidism, Dementia sent by home health after her calcium levels from her blood work done yesterday was too high. Per family patient has increased weakness, confusion, decreased appetite. She has a Hx of similar hypercalcemia and gets drowsy and fatigued each time. Her daughter denies that she has had any fever, CP, SOB, nausea and vomiting.  Currently no on hemodialysis.     Hospital Course:  Patient appeared to be volume depleated with elevated calcium and renal insufficiency upon admission and was started on IV fluids. Doses were adjusted to account for CHF. Repletion of lytes begun upon admission and is ongoing as per needs indicated by daily labs. She is being followed by Nephrology. On 4/19/2017 she began to develop heart block while being monitored on telemetry. Cardiology was consulted. Pacemaker suggested when mental status improves and no longer bed ridden. Currently working with PT/OT. Plan is for placement into SNF on Monday.     Interval History: Uneventful overnight. No new concerns today. Daughter at bedside, said tolerated liquid diet well today, and slightly more alert. Aware placement into SNF likely to be on Monday.     Review of Systems   Unable to perform ROS: Mental status change     Objective:     Vital Signs (Most Recent):  Temp: 98 °F (36.7 °C) (04/21/17 1626)  Pulse: 103 (04/21/17 1626)  Resp: 18 (04/21/17 1626)  BP: (!) 161/69 (04/21/17 1626)  SpO2: 96 % (04/21/17 1626) Vital Signs (24h Range):  Temp:  [97.3 °F (36.3 °C)-98.5 °F (36.9 °C)] 98 °F (36.7 °C)  Pulse:  []  103  Resp:  [18] 18  SpO2:  [93 %-98 %] 96 %  BP: (145-172)/(67-81) 161/69     Weight: 56.7 kg (125 lb)  Body mass index is 21.46 kg/(m^2).    Intake/Output Summary (Last 24 hours) at 04/21/17 1645  Last data filed at 04/21/17 1625   Gross per 24 hour   Intake             3555 ml   Output                0 ml   Net             3555 ml      Physical Exam   Constitutional: She is oriented to person, place, and time. She appears well-developed and well-nourished. No distress.   Awake today, responds minimally, tracks with eyes.    HENT:   Head: Normocephalic and atraumatic.   Eyes: Conjunctivae and EOM are normal. Pupils are equal, round, and reactive to light. No scleral icterus.   Neck: Normal range of motion. Neck supple. No tracheal deviation present.   Cardiovascular: Normal rate, regular rhythm, normal heart sounds and intact distal pulses.  Exam reveals no gallop and no friction rub.    No murmur heard.  Pulmonary/Chest: Effort normal and breath sounds normal. No stridor. No respiratory distress. She has no wheezes. She has no rales.   Abdominal: Soft. Bowel sounds are normal. She exhibits no distension. There is no tenderness.   Musculoskeletal: Normal range of motion. She exhibits no deformity.   Neurological: She is alert and oriented to person, place, and time. She exhibits normal muscle tone.   Cranial nerves grossly intact.    Skin: Skin is warm and dry. No rash noted. She is not diaphoretic. No erythema. No pallor.   Psychiatric: She has a normal mood and affect. Her behavior is normal. Judgment and thought content normal.   Nursing note and vitals reviewed.      Significant Labs:   BMP:   Recent Labs  Lab 04/21/17  0531   *      K 3.2*      CO2 24   BUN 54*   CREATININE 3.2*   CALCIUM 11.2*     CBC: No results for input(s): WBC, HGB, HCT, PLT in the last 48 hours.  CMP:   Recent Labs  Lab 04/20/17  0602 04/21/17  0531   * 142   K 3.4* 3.2*    109   CO2 24 24   GLU 80 119*    BUN 63* 54*   CREATININE 3.5* 3.2*   CALCIUM 12.9* 11.2*   ANIONGAP 12 9   EGFRNONAA 11* 12*     All pertinent labs within the past 24 hours have been reviewed.    Significant Imaging: I have reviewed all pertinent imaging results/findings within the past 24 hours.         Assessment/Plan:      * Hypercalcemia  - ? 2.2 calcitonin dose increase at [facility]   - 11.2 today   - IVF's, was given 1 L in ED  - monitor with AM labs   - Nephrology consulted   hold calcitriol   continue sensipar dose at 60 BID 2/2 nausea at high doses   Nasal Calcitonin   Continue IVF      Hypertension  - on amlodipine 2.5 QD & Lisinopril 20 mg QD   - hydralazine PRN  - pressures mildly elevated   - no PRN hydralazine today  - monitor    Hyperparathyroidism  - currently on sensipar  - calcitriol being hold      Chronic kidney disease, stage IV (severe)  - patient has had one round of HD, Renal fxn had stabilized and HD is on hold per family  - Cr now 3.2      Dementia  - 2/2 Alzheimers   - worsened 2/2 hypercalcemia  - continue home meds        Generalized weakness  - PT/ OT consulted       LLOYD (acute kidney injury)  - continue reduced dose of IVF's  - monitor  - 2/2 volume depletion  - no need for HD according to nephrology         Constipation  - bisacodyl suppository ordered & miralax begun      Mobitz type 1 second degree atrioventricular block  - Seen by Cardiology (Dr. Lazo): consider pacer when mental statues improves and not bedridden       Hypernatremia, resolved as of 4/21/2017  - 2/2 volume depletion  - IVF's  - monitor  - 04/21/2017: resolved       VTE Risk Mitigation         Ordered     heparin (porcine) injection 5,000 Units  Every 8 hours     Route:  Subcutaneous        04/18/17 1717     Medium Risk of VTE  Once      04/18/17 1717     Place sequential compression device  Until discontinued      04/18/17 1717     Place BARBARA hose  Until discontinued      04/18/17 1717          Sheryl Galo PA-C  Department of [facility]  Medicine   [facility]

## 2017-04-22 LAB
ANION GAP SERPL CALC-SCNC: 8 MMOL/L
BUN SERPL-MCNC: 44 MG/DL
CA-I BLDV-SCNC: 1.44 MMOL/L
CALCIUM SERPL-MCNC: 10.6 MG/DL
CHLORIDE SERPL-SCNC: 106 MMOL/L
CO2 SERPL-SCNC: 21 MMOL/L
CREAT SERPL-MCNC: 2.9 MG/DL
EST. GFR  (AFRICAN AMERICAN): 16 ML/MIN/1.73 M^2
EST. GFR  (NON AFRICAN AMERICAN): 14 ML/MIN/1.73 M^2
GLUCOSE SERPL-MCNC: 100 MG/DL
PHOSPHATE SERPL-MCNC: 2.5 MG/DL
POTASSIUM SERPL-SCNC: 3.8 MMOL/L
SODIUM SERPL-SCNC: 135 MMOL/L

## 2017-04-22 PROCEDURE — 11000001 HC ACUTE MED/SURG PRIVATE ROOM

## 2017-04-22 PROCEDURE — 99233 SBSQ HOSP IP/OBS HIGH 50: CPT | Mod: ,,, | Performed by: PHYSICIAN ASSISTANT

## 2017-04-22 PROCEDURE — 84100 ASSAY OF PHOSPHORUS: CPT

## 2017-04-22 PROCEDURE — 97110 THERAPEUTIC EXERCISES: CPT

## 2017-04-22 PROCEDURE — 36415 COLL VENOUS BLD VENIPUNCTURE: CPT

## 2017-04-22 PROCEDURE — 82330 ASSAY OF CALCIUM: CPT

## 2017-04-22 PROCEDURE — 63600175 PHARM REV CODE 636 W HCPCS: Performed by: NURSE PRACTITIONER

## 2017-04-22 PROCEDURE — 25000003 PHARM REV CODE 250: Performed by: INTERNAL MEDICINE

## 2017-04-22 PROCEDURE — 25000003 PHARM REV CODE 250: Performed by: PHYSICIAN ASSISTANT

## 2017-04-22 PROCEDURE — 97530 THERAPEUTIC ACTIVITIES: CPT

## 2017-04-22 PROCEDURE — 80048 BASIC METABOLIC PNL TOTAL CA: CPT

## 2017-04-22 PROCEDURE — 25000003 PHARM REV CODE 250: Performed by: NURSE PRACTITIONER

## 2017-04-22 RX ADMIN — HEPARIN SODIUM 5000 UNITS: 5000 INJECTION, SOLUTION INTRAVENOUS; SUBCUTANEOUS at 05:04

## 2017-04-22 RX ADMIN — CALCITONIN SALMON 1 SPRAY: 200 SPRAY, METERED NASAL at 09:04

## 2017-04-22 RX ADMIN — CINACALCET HYDROCHLORIDE 60 MG: 30 TABLET, COATED ORAL at 05:04

## 2017-04-22 RX ADMIN — HEPARIN SODIUM 5000 UNITS: 5000 INJECTION, SOLUTION INTRAVENOUS; SUBCUTANEOUS at 02:04

## 2017-04-22 RX ADMIN — CINACALCET HYDROCHLORIDE 60 MG: 30 TABLET, COATED ORAL at 09:04

## 2017-04-22 RX ADMIN — AMLODIPINE BESYLATE 10 MG: 5 TABLET ORAL at 09:04

## 2017-04-22 RX ADMIN — ASPIRIN 81 MG: 81 TABLET, COATED ORAL at 09:04

## 2017-04-22 RX ADMIN — DEXTROSE AND SODIUM CHLORIDE: 5; .45 INJECTION, SOLUTION INTRAVENOUS at 05:04

## 2017-04-22 RX ADMIN — HYDRALAZINE HYDROCHLORIDE 10 MG: 20 INJECTION INTRAMUSCULAR; INTRAVENOUS at 12:04

## 2017-04-22 RX ADMIN — POLYETHYLENE GLYCOL 3350 17 G: 17 POWDER, FOR SOLUTION ORAL at 09:04

## 2017-04-22 RX ADMIN — LEVOTHYROXINE SODIUM 150 MCG: 25 TABLET ORAL at 05:04

## 2017-04-22 RX ADMIN — HEPARIN SODIUM 5000 UNITS: 5000 INJECTION, SOLUTION INTRAVENOUS; SUBCUTANEOUS at 10:04

## 2017-04-22 NOTE — PLAN OF CARE
Problem: Physical Therapy Goal  Goal: Physical Therapy Goal  Goals to be met by: 5/15/17    Patient will increase functional independence with mobility by performin. Supine > sit with supervision.   2. Sit > supine with supervision.   3. Sit <> stand transfer with supervision with rolling walker.   4. Gait > 50 feet with CGA with rolling walker.   5. Ascend/descend curb step with CGA with rolling walker.    Pt progressing towards goals today, inc mobility today, pt able to t/f to commode chair via scoot t/f and max.A, then able to stand 5x's from commode chair (for hygeine purposes) w/ RW and max.A/totA x 1 w/ nsg present to assist w/ hygeine. Recommend return to SNF.

## 2017-04-22 NOTE — NURSING
No significant events overnight. Remains free from fall, injury, and skin breakdown. Incontinence care performed as needed. VSS on RA throughout the night. Denies pain. Tele maintained; all alarms active and audible. TEDs/SCDs in place. Plan of care reviewed with patient and all questions answered. Bed low, locked w/ bed alarm on. Call light within reach. Purposeful rounding performed. Resting comfortably in bed, sitter at the bedside, no other complaints at this time.

## 2017-04-22 NOTE — PROGRESS NOTES
[facility] Hospital Medicine  Progress Note    Patient Name: Mary Singleton  MRN: [id number]  Patient Class: IP- Inpatient   Admission Date: 4/18/2017  Length of Stay: 4 days  Attending Physician: Rene Smith MD  Primary Care Provider: Susana Arvizu MD        Subjective:     Principal Problem:Hypercalcemia    HPI:  89 y/o female with Hx of 1 HPTH s/p one parathyroid removal, CKDIV, HTN, hypothyroidism, Dementia sent by home health after her calcium levels from her blood work done yesterday was too high. Per family patient has increased weakness, confusion, decreased appetite. She has a Hx of similar hypercalcemia and gets drowsy and fatigued each time. Her daughter denies that she has had any fever, CP, SOB, nausea and vomiting.  Currently not on hemodialysis.     Hospital Course:  Patient appeared to be volume depleated with elevated calcium and renal insufficiency upon admission and was started on IV fluids. Doses were adjusted to account for CHF. Repletion of lytes begun upon admission and is ongoing as per needs indicated by daily labs. She is being followed by Nephrology. On 4/19/2017 she began to develop heart block while being monitored on telemetry. Cardiology was consulted. Pacemaker suggested when mental status improves and no longer bed ridden. Hypernatremia resolved. Hypercalcemia improving      Currently working with PT/OT. Plan is for placement into SNF on Monday.       Interval History: no overnight events, ate all of her dinner, sitter at bedside    Review of Systems   Unable to perform ROS: Dementia     Objective:     Vital Signs (Most Recent):  Temp: 98.4 °F (36.9 °C) (04/22/17 0500)  Pulse: 97 (04/22/17 0600)  Resp: 18 (04/22/17 0500)  BP: (!) 140/62 (04/22/17 0500)  SpO2: 95 % (04/22/17 0500) Vital Signs (24h Range):  Temp:  [97.3 °F (36.3 °C)-98.5 °F (36.9 °C)] 98.4 °F (36.9 °C)  Pulse:  [] 97  Resp:  [18] 18  SpO2:  [95 %-97 %] 95 %  BP: (140-182)/(62-77) 140/62      Weight: 56.7 kg (125 lb)  Body mass index is 21.46 kg/(m^2).    Intake/Output Summary (Last 24 hours) at 04/22/17 0825  Last data filed at 04/22/17 0530   Gross per 24 hour   Intake             3100 ml   Output                0 ml   Net             3100 ml      Physical Exam   Constitutional: She appears well-developed and well-nourished. No distress.   HENT:   Head: Normocephalic and atraumatic.   Eyes: Conjunctivae and EOM are normal. Pupils are equal, round, and reactive to light. No scleral icterus.   Neck: Normal range of motion. Neck supple. No tracheal deviation present.   Cardiovascular: Normal rate, regular rhythm, normal heart sounds and intact distal pulses.  Exam reveals no gallop and no friction rub.    No murmur heard.  Pulmonary/Chest: Effort normal and breath sounds normal. No stridor. No respiratory distress. She has no wheezes. She has no rales.   Abdominal: Soft. Bowel sounds are normal. She exhibits no distension. There is no tenderness.   Musculoskeletal: Normal range of motion. She exhibits no deformity.   Neurological: She is alert.   Cranial nerves grossly intact.    Skin: Skin is warm and dry. No rash noted. She is not diaphoretic. No erythema. No pallor.   Psychiatric: She has a normal mood and affect. Her behavior is normal. Judgment and thought content normal.   Nursing note and vitals reviewed.      Significant Labs:   CMP:   Recent Labs  Lab 04/21/17  0531 04/22/17  0524    135*   K 3.2* 3.8    106   CO2 24 21*   * 100   BUN 54* 44*   CREATININE 3.2* 2.9*   CALCIUM 11.2* 10.6*   ANIONGAP 9 8   EGFRNONAA 12* 14*           Assessment/Plan:      * Hypercalcemia  - possibly 2.2 calcitonin dose increase recently  - improving down to 10.6, ionized  1.44  - IVF's, was given 1 L in ED  - monitor  - Nephrology following   hold calcitriol   continue sensipar dose at 60 BID 2/2 nausea at high doses   Nasal Calcitonin   Continue IVF      Chronic kidney disease, stage IV  (severe)  - likely d/t volume depletion  - improving 3.9 > 2.9 today  patient has had one round of HD previous admission, Renal fxn had stabilized and HD is on hold per family        LLOYD (acute kidney injury)  - improving  - 2/2 volume depletion  - no need for HD according to nephrology         Hypertension  - on amlodipine  10 mg  - hydralazine PRN  - monitor    Hyperparathyroidism  - currently on sensipar at home  - calcitriol being hold      Dementia  - 2/2 Alzheimers   - worsened 2/2 hypercalcemia  - continue home meds        Generalized weakness  - PT/ OT consulted       Constipation  - bisacodyl suppository ordered & miralax begun      Mobitz type 1 second degree atrioventricular block  - Seen by Cardiology (Dr. Lazo): consider pacer when mental statues improves and not bedridden       VTE Risk Mitigation         Ordered     heparin (porcine) injection 5,000 Units  Every 8 hours     Route:  Subcutaneous        04/18/17 1717     Medium Risk of VTE  Once      04/18/17 1717     Place sequential compression device  Until discontinued      04/18/17 1717     Place BARBARA hose  Until discontinued      04/18/17 1717          Yee Paz PA-C  Department of Hospital Medicine   [facility]

## 2017-04-22 NOTE — SUBJECTIVE & OBJECTIVE
Interval History: no overnight events, ate all of her dinner, sitter at bedside    Review of Systems   Unable to perform ROS: Dementia     Objective:     Vital Signs (Most Recent):  Temp: 98.4 °F (36.9 °C) (04/22/17 0500)  Pulse: 97 (04/22/17 0600)  Resp: 18 (04/22/17 0500)  BP: (!) 140/62 (04/22/17 0500)  SpO2: 95 % (04/22/17 0500) Vital Signs (24h Range):  Temp:  [97.3 °F (36.3 °C)-98.5 °F (36.9 °C)] 98.4 °F (36.9 °C)  Pulse:  [] 97  Resp:  [18] 18  SpO2:  [95 %-97 %] 95 %  BP: (140-182)/(62-77) 140/62     Weight: 56.7 kg (125 lb)  Body mass index is 21.46 kg/(m^2).    Intake/Output Summary (Last 24 hours) at 04/22/17 0825  Last data filed at 04/22/17 0530   Gross per 24 hour   Intake             3100 ml   Output                0 ml   Net             3100 ml      Physical Exam   Constitutional: She appears well-developed and well-nourished. No distress.   HENT:   Head: Normocephalic and atraumatic.   Eyes: Conjunctivae and EOM are normal. Pupils are equal, round, and reactive to light. No scleral icterus.   Neck: Normal range of motion. Neck supple. No tracheal deviation present.   Cardiovascular: Normal rate, regular rhythm, normal heart sounds and intact distal pulses.  Exam reveals no gallop and no friction rub.    No murmur heard.  Pulmonary/Chest: Effort normal and breath sounds normal. No stridor. No respiratory distress. She has no wheezes. She has no rales.   Abdominal: Soft. Bowel sounds are normal. She exhibits no distension. There is no tenderness.   Musculoskeletal: Normal range of motion. She exhibits no deformity.   Neurological: She is alert.   Cranial nerves grossly intact.    Skin: Skin is warm and dry. No rash noted. She is not diaphoretic. No erythema. No pallor.   Psychiatric: She has a normal mood and affect. Her behavior is normal. Judgment and thought content normal.   Nursing note and vitals reviewed.      Significant Labs:   CMP:   Recent Labs  Lab 04/21/17  0531 04/22/17  0524   NA  142 135*   K 3.2* 3.8    106   CO2 24 21*   * 100   BUN 54* 44*   CREATININE 3.2* 2.9*   CALCIUM 11.2* 10.6*   ANIONGAP 9 8   EGFRNONAA 12* 14*

## 2017-04-22 NOTE — PROGRESS NOTES
"Nephrology  Progress Note    Admit Date: 4/18/2017   LOS: 4 days     SUBJECTIVE:     Follow-up For:  Hypercalcemia    Interval History:     Much more alert today and seems back to her baseline mental status. She participated well with therapies today and eat Full liq diet well.  Grandson at bedside.  Ionized ca improving almost normal today.  Tolerating pills. Denies pain.    Review of Systems:    AMS but NAD    OBJECTIVE:     Vital Signs Range (Last 24H):  /85 (BP Location: Left arm, Patient Position: Lying, BP Method: Automatic)  Pulse 87  Temp 98.3 °F (36.8 °C) (Oral)   Resp 18  Ht 5' 4" (1.626 m)  Wt 56.7 kg (125 lb)  SpO2 97%  Breastfeeding? No  BMI 21.46 kg/m2    Temp:  [98 °F (36.7 °C)-98.5 °F (36.9 °C)]   Pulse:  []   Resp:  [18]   BP: (135-182)/(62-85)   SpO2:  [95 %-97 %]     I & O (Last 24H):    Intake/Output Summary (Last 24 hours) at 04/22/17 1624  Last data filed at 04/22/17 0530   Gross per 24 hour   Intake             2620 ml   Output                0 ml   Net             2620 ml       Physical Exam:  General appearance: elderly and ill appearing.  More alert but baseline confused.  Eyes:  Conjunctivae/corneas clear. PERRL.  Lungs: Normal respiratory effort,   clear to auscultation bilaterally   Heart: Irregular/Irregular rate and rhythm, S1, S2 normal, no murmur, rub or julio c.  Abdomen: Soft, non-tender non-distended; bowel sounds normal; no masses,  no organomegaly  Extremities: No cyanosis or clubbing. No edema.    Skin: Skin color, texture, turgor normal. No rashes or lesions  Neurologic: No focal numbness or weakness   Right Arm AVF:  +    Laboratory Data:  No results for input(s): WBC, RBC, HGB, HCT, PLT, MCV, MCH, MCHC in the last 24 hours.    BMP:     Recent Labs  Lab 04/19/17  0502  04/22/17  0524   GLU 83  < > 100   *  < > 135*   K 3.3*  < > 3.8     < > 106   CO2 25  < > 21*   BUN 75*  < > 44*   CREATININE 3.5*  < > 2.9*   CALCIUM 13.8*  < > 10.6*   MG 1.9  " --   --    < > = values in this interval not displayed.  Lab Results   Component Value Date    .1 (H) 04/18/2017    CALCIUM 10.6 (H) 04/22/2017    CAION 1.44 (H) 04/22/2017    PHOS 2.5 (L) 04/22/2017             Medications:  Medication list was reviewed and changes noted under Assessment/Plan.    Diagnostic Results:        ASSESSMENT/PLAN:     89 YO WF with LLOYD on CKD4B, Alzheimer's dementia, primary hyperparathyroidism with symptomatic hypercalcemia and volume depletion.        1. Hypercalcemia: Slowly resolving.  Holding Calcitriol/D3.  Using nasal calcitonin  And seems to be tolerating sensipar thus will d/c calcitonin. If sensipar not enough will see some rebound elevation in Ca level in few days. Decrease IVFs and try to d/c tomorrow.  2. LLOYD on CKD4B: Likely due to volume depletion. No indication for HD yet. Had recent admission during which she required temporary HD, but she has since stablized and not required HD. She has an intact AVF. Labs pending this am. Cr nearing baseline.  3. HTN: Restarted oral Norvasc but using hydralazine PRN.   4. Alz Dementia: Exacerbated by hypercalcemia. Holding meds for now, but can likely resume. Place all four rails up to keep her free of falls. Encourage family to keep someone at bedside for redirection and ensure she doesn't get out of bed.    5. Hypothyroidism: On synthroid.  TSH/T4 wnl.  6. 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 Lazo following.  Considering Pacer.   7. Hypernatremia (E87.0):  Adjusted IVF's.    8. Mild hypokalemia (E87.6):  Replace and monitor.    9. H/O CHF: seems compensated at present but will decrease volume of IVF's.   10. Nutrition: needs ST consult and advancement of diet if safe.

## 2017-04-22 NOTE — NURSING
Hourly rounding performed. No pain indicated throughout shift. Pt VSS and afebrile, free of falls, trauma. Injury, and skin break downs. Pt denies SOB, CP, & N/V. Pt in low locked bed, call light in reach. Grandson at bedside.

## 2017-04-22 NOTE — ASSESSMENT & PLAN NOTE
- possibly 2.2 calcitonin dose increase recently  - improving down to 10.6, ionized  1.44  - IVF's, was given 1 L in ED  - monitor  - Nephrology following   hold calcitriol   continue sensipar dose at 60 BID 2/2 nausea at high doses   Nasal Calcitonin   Continue IVF

## 2017-04-22 NOTE — PROGRESS NOTES
Confused. Daughter at bedside, made her swallow her pills.    Vitals:    04/22/17 0400 04/22/17 0500 04/22/17 0600 04/22/17 0710   BP:  (!) 140/62  135/85   BP Location:  Left arm  Left arm   Patient Position:  Lying  Lying   BP Method:  Automatic  Automatic   Pulse: 92 94 97 98   Resp:  18  18   Temp:  98.4 °F (36.9 °C)  98.3 °F (36.8 °C)   TempSrc:  Oral  Oral   SpO2:  95%  97%   Weight:       Height:           Chest clear  Cor: skips+    Telemetry reviewed. She continues to have episodes of type I second degree heart block.    Labs reviewed.    Discussed at length with daughter who says that this heart block has been a long standing phenomenon. Dr Gallo had discussed this with the family and reassured them that this was benign, and did not warrant pacing.

## 2017-04-22 NOTE — ASSESSMENT & PLAN NOTE
- likely d/t volume depletion  - improving 3.9 > 2.9 today  patient has had one round of HD previous admission, Renal fxn had stabilized and HD is on hold per family

## 2017-04-22 NOTE — PT/OT/SLP PROGRESS
"Physical Therapy  Treatment    Mary Singleton   MRN: [id number]   Admitting Diagnosis: Hypercalcemia    PT Received On: 04/22/17  PT Start Time: 1113     PT Stop Time: 1154    PT Total Time (min): 41 min       Billable Minutes:  Therapeutic Activity 31 and Therapeutic Exercise 10    Treatment Type: Treatment  PT/PTA: PTA     PTA Visit Number: 1       General Precautions: Standard, fall  Orthopedic Precautions: N/A   Braces: N/A    Do you have any cultural, spiritual, Mu-ism conflicts, given your current situation?: Druze per chart    Subjective:  Communicated with ns prior to session.  Pt agreeable to tx. , daughter present for most of tx.     Pain Rating:  (Pt stating "ow" w/ any touch to extremeties, daughter reports she says anything hurts)              Pain Rating Post-Intervention: 0/10 (pt seemed to be resting comfortably after tx)    Objective:   Patient found with: peripheral IV, telemetry, SCD    Functional Mobility:  Bed Mobility:   Supine to Sit: Maximum Assistance, With side rail  Sit to Supine: Maximum Assistance, With leg lift    Transfers:  Sit <> Stand Assistance: Maximum Assistance, Total Assistance (x 5 trials from commode chair)  Sit <> Stand Assistive Device: Rolling Walker  Bed <> Chair Technique: Scoot Pivot  Bed <> Chair Assistance: Maximum Assistance  Bed <> Chair Assistive Device: No Assistive Device (drop arm commode chair used)    Gait:   Gait Distance: attempted 2 steps commode to bed w/ RW and max.A (nsg present for safety)  Assistance 1: Maximum assistance  Gait Assistive Device: Rolling walker    Stairs:  n/a    Balance:   Static Sit: FAIR-: Maintains without assist but inconsistent   Dynamic Sit: FAIR: Cannot move trunk without losing balance  Static Stand: 0: Needs MAXIMAL assist to maintain   Dynamic stand: 0: N/A     Therapeutic Activities and Exercises:  Pt found on bedpan, but unable to urinate properly into pan. Pt. T/f'd sup to sit EOB w/ max.A x 1, pt reaching for " bedrail and assisting. Perf'd scoot t/f to drop arm commode w/ max.A x 1, pt had large BM, sat ~20 min. Total w/ SBA for safety, pt stood from commode chair 5x's w/ RW and max./totA x 1, PCT present to assist w/ hygeine. Pt. Then t/f'd to bed using RW and maxA x 2 (nsg assisting), pt only able to take ~2 sm steps.  Pt ret'd to supine w/ maxA, Dep x 2 slide up in bed, perf'd supine LE ex's of AP's, heelslides, hip abd/add x 10 ea.     AM-PAC 6 CLICK MOBILITY  How much help from another person does this patient currently need?   1 = Unable, Total/Dependent Assistance  2 = A lot, Maximum/Moderate Assistance  3 = A little, Minimum/Contact Guard/Supervision  4 = None, Modified [place]/Independent    Turning over in bed (including adjusting bedclothes, sheets and blankets)?: 2  Sitting down on and standing up from a chair with arms (e.g., wheelchair, bedside commode, etc.): 2  Moving from lying on back to sitting on the side of the bed?: 2  Moving to and from a bed to a chair (including a wheelchair)?: 2  Need to walk in hospital room?: 1  Climbing 3-5 steps with a railing?: 1  Total Score: 10    AM-PAC Raw Score CMS G-Code Modifier Level of Impairment Assistance   6 % Total / Unable   7 - 9 CM 80 - 100% Maximal Assist   10 - 14 CL 60 - 80% Moderate Assist   15 - 19 CK 40 - 60% Moderate Assist   20 - 22 CJ 20 - 40% Minimal Assist   23 CI 1-20% SBA / CGA   24 CH 0% Independent/ Mod I     Patient left HOB elevated with all lines intact, call button in reach, bed alarm on and daughter present.    Assessment:  Mary Singleton is a 89 y.o. female with a medical diagnosis of Hypercalcemia and presents with slight inc in mobility today, pt able to t/f to commode chair and able to stand w/ RW multiple times. Pt w/ some SOB w/ activity, though O2 sats 98% on RA, HR:90.    Rehab identified problem list/impairments: Rehab identified problem list/impairments: weakness, impaired endurance, impaired self care skills,  impaired functional mobilty, impaired cognition, decreased safety awareness, impaired balance    Rehab potential is good.    Activity tolerance: Good    Discharge recommendations: Discharge Facility/Level Of Care Needs: nursing facility, skilled     Barriers to discharge: Barriers to Discharge: Decreased caregiver support    Equipment recommendations: Equipment Needed After Discharge:  (TBD)     GOALS:   Physical Therapy Goals        Problem: Physical Therapy Goal    Goal Priority Disciplines Outcome Goal Variances Interventions   Physical Therapy Goal     PT/OT, PT Ongoing (interventions implemented as appropriate)     Description:  Goals to be met by: 5/15/17    Patient will increase functional independence with mobility by performin. Supine > sit with supervision.   2. Sit > supine with supervision.   3. Sit <> stand transfer with supervision with rolling walker.   4. Gait > 50 feet with CGA with rolling walker.   5. Ascend/descend curb step with CGA with rolling walker.                 PLAN:    Patient to be seen 6 x/week  to address the above listed problems via gait training, therapeutic activities, therapeutic exercises, neuromuscular re-education  Plan of Care expires: 17  Plan of Care reviewed with: patient, daughter         Kim Brown, PTA  2017

## 2017-04-23 PROBLEM — G93.41 ENCEPHALOPATHY, METABOLIC: Status: ACTIVE | Noted: 2017-04-23

## 2017-04-23 PROBLEM — M79.602 LEFT ARM PAIN: Status: ACTIVE | Noted: 2017-04-23

## 2017-04-23 LAB
ANION GAP SERPL CALC-SCNC: 9 MMOL/L
BUN SERPL-MCNC: 41 MG/DL
CA-I BLDV-SCNC: 1.51 MMOL/L
CALCIUM SERPL-MCNC: 11 MG/DL
CHLORIDE SERPL-SCNC: 107 MMOL/L
CO2 SERPL-SCNC: 19 MMOL/L
CREAT SERPL-MCNC: 2.9 MG/DL
EST. GFR  (AFRICAN AMERICAN): 16 ML/MIN/1.73 M^2
EST. GFR  (NON AFRICAN AMERICAN): 14 ML/MIN/1.73 M^2
GLUCOSE SERPL-MCNC: 84 MG/DL
POTASSIUM SERPL-SCNC: 4.3 MMOL/L
SODIUM SERPL-SCNC: 135 MMOL/L

## 2017-04-23 PROCEDURE — 25000003 PHARM REV CODE 250: Performed by: INTERNAL MEDICINE

## 2017-04-23 PROCEDURE — 97530 THERAPEUTIC ACTIVITIES: CPT

## 2017-04-23 PROCEDURE — 92610 EVALUATE SWALLOWING FUNCTION: CPT

## 2017-04-23 PROCEDURE — 82330 ASSAY OF CALCIUM: CPT

## 2017-04-23 PROCEDURE — 99233 SBSQ HOSP IP/OBS HIGH 50: CPT | Mod: ,,, | Performed by: PHYSICIAN ASSISTANT

## 2017-04-23 PROCEDURE — 80048 BASIC METABOLIC PNL TOTAL CA: CPT

## 2017-04-23 PROCEDURE — 25000003 PHARM REV CODE 250: Performed by: PHYSICIAN ASSISTANT

## 2017-04-23 PROCEDURE — 36415 COLL VENOUS BLD VENIPUNCTURE: CPT

## 2017-04-23 PROCEDURE — 97535 SELF CARE MNGMENT TRAINING: CPT

## 2017-04-23 PROCEDURE — 11000001 HC ACUTE MED/SURG PRIVATE ROOM

## 2017-04-23 RX ORDER — HEPARIN SODIUM 5000 [USP'U]/ML
5000 INJECTION, SOLUTION INTRAVENOUS; SUBCUTANEOUS EVERY 12 HOURS
Status: DISCONTINUED | OUTPATIENT
Start: 2017-04-23 | End: 2017-04-25 | Stop reason: HOSPADM

## 2017-04-23 RX ADMIN — CINACALCET HYDROCHLORIDE 60 MG: 30 TABLET, COATED ORAL at 09:04

## 2017-04-23 RX ADMIN — POLYETHYLENE GLYCOL 3350 17 G: 17 POWDER, FOR SOLUTION ORAL at 09:04

## 2017-04-23 RX ADMIN — HEPARIN SODIUM 5000 UNITS: 5000 INJECTION, SOLUTION INTRAVENOUS; SUBCUTANEOUS at 08:04

## 2017-04-23 RX ADMIN — CINACALCET HYDROCHLORIDE 60 MG: 30 TABLET, COATED ORAL at 04:04

## 2017-04-23 RX ADMIN — LEVOTHYROXINE SODIUM 150 MCG: 25 TABLET ORAL at 06:04

## 2017-04-23 RX ADMIN — ASPIRIN 81 MG: 81 TABLET, COATED ORAL at 09:04

## 2017-04-23 RX ADMIN — AMLODIPINE BESYLATE 10 MG: 5 TABLET ORAL at 06:04

## 2017-04-23 NOTE — NURSING
Hourly rounding performed. No pain reported throughout shift. Pt VSS and afebrile, free of falls, trauma. Injury, and skin break downs. Pt denies SOB, CP, & N/V. Pt in low locked bed, call light in reach.

## 2017-04-23 NOTE — ASSESSMENT & PLAN NOTE
- likely d/t volume depletion  - improved from admission Cr at 2.9 plateau   patient has had one round of HD previous admission, Renal fxn had stabilized and HD is on hold per family

## 2017-04-23 NOTE — ASSESSMENT & PLAN NOTE
- Seen by Cardiology (Dr. Lazo): consider pacer when mental statues improves and not bedridden   - per family, longstanding

## 2017-04-23 NOTE — PLAN OF CARE
Problem: SLP Goal  Goal: SLP Goal  Outcome: Ongoing (interventions implemented as appropriate)  1. The pt will tolerate a pureed diet with thin liquids with adequate oral prep and clearance, and no overt s/s airway threat, following safe swallowing guidelines, given assistance for all meals.  2. The pt will participate in ongoing assessment of swallowing safety and diet tolerance, with further recs to follow as appropriate.     Comments:   Initial b/s assessment of swallowing completed.  Sitter present for evaluation; stated pt tolerated full liquids/thin liquids during lunch meal with no overt coughing or choking.  Recommend upgrading diet to puree with thin liquids.  The pt will require assistance with all meals.

## 2017-04-23 NOTE — PROGRESS NOTES
Meng says she is a little better today. Had some coughing after eating yesterday (? Aspiration). Tolerated breakfast this morning.    No blackout spells.    Vitals:    04/23/17 0707 04/23/17 0800 04/23/17 1000 04/23/17 1200   BP: (!) 163/71      BP Location: Left arm      Patient Position: Lying      BP Method: Automatic      Pulse: 90 91 94 96   Resp: 16      Temp: 98.4 °F (36.9 °C)      TempSrc: Oral      SpO2: 97%      Weight:       Height:           Chest clear  Cor: no gallop.    Telemetry shows NSR, rare episodes of type I second degree block. Has prolonged P-R interval.    Labs reviewed.    Stable, cardiac wise.

## 2017-04-23 NOTE — PLAN OF CARE
Unsuccessful attempts x 2 per ALFREDO Carvajal, to establish IV access.    0006: Dr. Caruso notified re: patient without IV access. He reviewed most recent calcium and albumin levels. He stated ok to leave IV out until morning; encourage oral fluids overnight if possible.    0552: Dr. Caruso notified re: HTN. He stated give 0900 dose of amlodipine now. Patient also observed with heavy bleeding/oozing following IV removal and heparin administration last night. Dr. Caruso notified; he authorized holding this am dose of heparin.

## 2017-04-23 NOTE — PROGRESS NOTES
[facility] Hospital Medicine  Progress Note    Patient Name: Mary Singleton  MRN: [id number]  Patient Class: IP- Inpatient   Admission Date: 4/18/2017  Length of Stay: 5 days  Attending Physician: Rene Smith MD  Primary Care Provider: Susana Arvizu MD        Subjective:     Principal Problem:Hypercalcemia    HPI:  89 y/o female with Hx of 1 HPTH s/p one parathyroid removal, CKDIV, HTN, hypothyroidism, Dementia sent by home health after her calcium levels from her blood work done yesterday was too high. Per family patient has increased weakness, confusion, decreased appetite. She has a Hx of similar hypercalcemia and gets drowsy and fatigued each time. Her daughter denies that she has had any fever, CP, SOB, nausea and vomiting.  Currently not on hemodialysis.     Hospital Course:  Patient appeared to be volume depleated with elevated calcium and renal insufficiency upon admission and was started on IV fluids. Doses were adjusted to account for CHF. Repletion of lytes begun upon admission and is ongoing as per needs indicated by daily labs. She is being followed by Nephrology. On 4/19/2017 she began to develop heart block while being monitored on telemetry. Cardiology was consulted. Pacemaker suggested when mental status improves and no longer bed ridden. Hypernatremia resolved. Hypercalcemia improving      Currently working with PT/OT. Plan is for placement into SNF on Monday.       Interval History: lost IV overnight; per bedside sitter patient slept well, participated with therapy yesterday; no N/V/D    Review of Systems   Unable to perform ROS: Dementia     Objective:     Vital Signs (Most Recent):  Temp: 98.5 °F (36.9 °C) (04/23/17 0340)  Pulse: 94 (04/23/17 0600)  Resp: 18 (04/23/17 0340)  BP: (!) 187/76 (04/23/17 0545)  SpO2: 96 % (04/23/17 0340) Vital Signs (24h Range):  Temp:  [98.1 °F (36.7 °C)-98.5 °F (36.9 °C)] 98.5 °F (36.9 °C)  Pulse:  [] 94  Resp:  [18-20] 18  SpO2:  [95  %-98 %] 96 %  BP: (154-187)/(65-77) 187/76     Weight: 56.7 kg (125 lb)  Body mass index is 21.46 kg/(m^2).    Intake/Output Summary (Last 24 hours) at 04/23/17 0808  Last data filed at 04/22/17 1600   Gross per 24 hour   Intake              120 ml   Output              300 ml   Net             -180 ml      Physical Exam   Constitutional: She appears well-developed and well-nourished. No distress.   HENT:   Head: Normocephalic and atraumatic.   Eyes: Conjunctivae and EOM are normal. Pupils are equal, round, and reactive to light. No scleral icterus.   Neck: Normal range of motion. Neck supple. No tracheal deviation present.   Cardiovascular: Normal rate, regular rhythm, normal heart sounds and intact distal pulses.  Exam reveals no gallop and no friction rub.    No murmur heard.  Pulmonary/Chest: Effort normal and breath sounds normal. No stridor. No respiratory distress. She has no wheezes. She has no rales.   Abdominal: Soft. Bowel sounds are normal. She exhibits no distension. There is no tenderness.   Musculoskeletal: Normal range of motion. She exhibits no deformity.   Left UE erythema   Neurological: She is alert.   Cranial nerves grossly intact.    Skin: Skin is warm and dry. No rash noted. She is not diaphoretic. No erythema. No pallor.   Psychiatric: She has a normal mood and affect. Her behavior is normal. Judgment and thought content normal.   Nursing note and vitals reviewed.      Significant Labs:   CMP:   Recent Labs  Lab 04/22/17  0524 04/23/17  0558   * 135*   K 3.8 4.3    107   CO2 21* 19*    84   BUN 44* 41*   CREATININE 2.9* 2.9*   CALCIUM 10.6* 11.0*   ANIONGAP 8 9   EGFRNONAA 14* 14*     All pertinent labs within the past 24 hours have been reviewed.      Assessment/Plan:      * Hypercalcemia  - possibly 2.2 calcitonin dose increase recently  - IVF's per Nephrology, was given 1 L in ED  - monitor  - Nephrology following   hold calcitriol   continue sensipar dose at 60 BID 2/2  nausea at high doses   Nasal Calcitonin      Chronic kidney disease, stage IV (severe)  - likely d/t volume depletion  - improved from admission Cr at 2.9 plateau   patient has had one round of HD previous admission, Renal fxn had stabilized and HD is on hold per family        LLOYD (acute kidney injury)  - improving  - likely 2/2 volume depletion  - no need for HD according to nephrology         Hypertension  - on amlodipine  10 mg, given early this a.m., BP elevated  - hydralazine PRN  - monitor    Hyperparathyroidism  - currently on sensipar at home  - calcitriol being hold      Dementia  - 2/2 Alzheimers   - worsened 2/2 hypercalcemia  - continue home meds        Generalized weakness  - PT/ OT consulted       Constipation  - bisacodyl suppository ordered & miralax begun      Mobitz type 1 second degree atrioventricular block  - Seen by Cardiology (Dr. Lazo): consider pacer when mental statues improves and not bedridden   - per family, longstanding      Encephalopathy, metabolic  - d/t hypercalcemia on dementia  - improving      Left arm pain  - erythema dn pain to LUE  - check doppler r/o DVT      VTE Risk Mitigation         Ordered     heparin (porcine) injection 5,000 Units  Every 8 hours     Route:  Subcutaneous        04/18/17 1717     Medium Risk of VTE  Once      04/18/17 1717     Place sequential compression device  Until discontinued      04/18/17 1717     Place BARBARA hose  Until discontinued      04/18/17 1717          Yee Paz PA-C  Department of Hospital Medicine   [facility]

## 2017-04-23 NOTE — PT/OT/SLP PROGRESS
Occupational Therapy  Treatment    Mary Singleton   MRN: [id number]   Admitting Diagnosis: Hypercalcemia    OT Date of Treatment: 04/23/17   OT Start Time: 1450  OT Stop Time: 1536  OT Total Time (min): 46 min    Billable Minutes:  Self Care/Home Management 18 and Therapeutic Activity 28    General Precautions: Standard, fall, aspiration, hearing impaired, blind  Orthopedic Precautions: N/A  Braces: N/A    Do you have any cultural, spiritual, Jehovah's witness conflicts, given your current situation?: Mu-ism    Subjective:  The patient was found supine in bed, awake, sitter present.  She was responsive to OT treatment attempting most tasks with VC and manual assist needed for optimum task performance.    Pain Rating:  (UT: no pain reactions at rest)  Location - Orientation: generalized  Location:  (skin responds as if she is hypersensitive to touch)  Pain Addressed: Reposition, Nurse notified, Other (see comments) (verbal pre-instructions of all touching and tasks before initiateion)  Pain Rating Post-Intervention:  (UT: pain reachts to trunk, pelvic, both arms when touched)    Objective:  Patient found with: SCD, telemetry, peripheral IV     Functional Mobility:  Bed Mobility:  Rolling/Turning to Left: Total assistance  Rolling/Turning Right: Total assistance  Scooting/Bridging: With assist of 2, Total Assistance  Supine to Sit: Maximum Assistance  Sit to Supine: Total Assistance, With assist of 2    Transfers:    Max A sit><stand (manual lift) leaned backwards, not able to safely stand with walker      Functional Ambulation: none    Activities of Daily Living:  Feeding Level of Assistance: Maximum assistance (drink ice tea and eat bite of cracker bed level - Max A : tremors BUE with difficulty reaching, slow difficult response, functionally she is Total A for eating)  UE Dressing Level of Assistance: Total assistance (don/doff hospital gown as robe sitting EOB with assist for sitting balance during tasks)  Grooming  Position: EOB, Seated  Grooming Level of Assistance: Moderate assistance (wash and dry face and hands-repeat VC with manual assist needed to attempt to wash hands, poor completeness during task)  Toileting Where Assessed: Bed level  Toileting Level of Assistance: Total assistance (pt is incontinant)    Balance:   Static Sit: POOR+: Needs MINIMAL assist to maintain (LUE used for support on bed with ability to sit with hands on knees for brief periods of time)  Dynamic Sit: POOR: N/A (Mod A for weight shift with manual assist to assist balance and prevent fall), pt leans to Left in sitting  Static Stand: 0: Needs MAXIMAL assist to maintain   Dynamic stand: NT    Therapeutic Activities and Exercises:  Cognitive Re-Education (orientation and memory tasks), dynamic sitting balance training at EOB    AM-PAC 6 CLICK ADL   How much help from another person does this patient currently need?   1 = Unable, Total/Dependent Assistance  2 = A lot, Maximum/Moderate Assistance  3 = A little, Minimum/Contact Guard/Supervision  4 = None, Modified [place]/Independent    Putting on and taking off regular lower body clothing? : 1  Bathing (including washing, rinsing, drying)?: 1  Toileting, which includes using toilet, bedpan, or urinal? : 1  Putting on and taking off regular upper body clothing?: 2  Taking care of personal grooming such as brushing teeth?: 2  Eating meals?: 2  Total Score: 9     AM-PAC Raw Score CMS G-Code Modifier Level of Impairment Assistance   6 % Total / Unable   7 - 9 CM 80 - 100% Maximal Assist   10 - 14 CL 60 - 80% Moderate Assist   15 - 19 CK 40 - 60% Moderate Assist   20 - 22 CJ 20 - 40% Minimal Assist   23 CI 1-20% SBA / CGA   24 CH 0% Independent/ Mod I     Patient left right sidelying with all lines intact, call button in reach, bed alarm on and patient's sitter present    ASSESSMENT:  Mary Singleton is a 89 y.o. female with a medical diagnosis of Hypercalcemia and presents with  weakness,  impaired balance, decreased safety awareness, impaired skin, pain, decreased upper extremity function, decreased lower extremity function, visual deficits, impaired functional mobility, impaired endurance, gait instability, impaired fine motor function contributed to her difficulty performing tasks during the session. The patient actively attempted most tasks with verbal and manual cues needed to initiate and complete most tasks. She was Max A/Total A for bed mobility, Max A sit><stand (Total A to retain standing 30 seconds x2), Total A UBD, Max A self-feeding, and Mod A G/H (wash face and hands) with sitting balance at EOB and cognitive orientation and memory exercises included in the treatment session. Continuation of OT treatment is needed to maximize function while in the hospital  .    Rehab identified problem list/impairments: Rehab identified problem list/impairments: weakness, impaired balance, decreased safety awareness, impaired skin, pain, decreased upper extremity function, decreased lower extremity function, visual deficits, impaired functional mobility, impaired endurance, gait instability, impaired fine motor    Rehab potential is fair.    Activity tolerance: Fair    Discharge recommendations: Discharge Facility/Level Of Care Needs: nursing facility, skilled (If discharged home she needs 24 hour care)     Barriers to discharge: Barriers to Discharge: Decreased caregiver support    Equipment recommendations:  (TBD)     GOALS:   Occupational Therapy Goals        Problem: Occupational Therapy Goal    Goal Priority Disciplines Outcome Interventions   Occupational Therapy Goal     OT, PT/OT Ongoing (interventions implemented as appropriate)    Description:  Goals to be met by: 4/30/2017    Patient will increase functional independence with ADLs by performing:    Feeding with Stand-by Assistance.  Grooming while seated with Stand-by Assistance.  Toileting from bedside commode with Moderate Assistance for  hygiene and clothing management.   Rolling to Bilateral with Contact Guard Assistance.   Supine to sit with Minimal Assistance.  Toilet transfer to bedside commode with Moderate Assistance.  Increased functional strength to 4-/5 for RUE; LUE elbow and hand 4-/5.  Upper extremity exercise program 10 reps per handout, with assistance as needed.                Plan:  Patient to be seen 6 x/week to address the above listed problems via self-care/home management, therapeutic activities, therapeutic exercises, cognitive retraining, neuromuscular re-education  Plan of Care expires: 05/20/17  Plan of Care reviewed with: patient (sitter)         MAGUE Sharma  04/23/2017

## 2017-04-23 NOTE — PLAN OF CARE
Problem: Occupational Therapy Goal  Goal: Occupational Therapy Goal  Goals to be met by: 4/30/2017    Patient will increase functional independence with ADLs by performing:    Feeding with Stand-by Assistance.  Grooming while seated with Stand-by Assistance.  Toileting from bedside commode with Moderate Assistance for hygiene and clothing management.   Rolling to Bilateral with Contact Guard Assistance.   Supine to sit with Minimal Assistance.  Toilet transfer to bedside commode with Moderate Assistance.  Increased functional strength to 4-/5 for RUE; LUE elbow and hand 4-/5.  Upper extremity exercise program 10 reps per handout, with assistance as needed.   Outcome: Ongoing (interventions implemented as appropriate)  The patient actively attempted most tasks with verbal and manual cues needed to initiate and complete most tasks.  She was Max A/Total A for bed mobility, Max A sit><stand (Total A to retain standing 30 seconds x2), Total A UBD, Max A self-feeding, and Mod A G/H (wash face and hands) with sitting balance at EOB and cognitive orientation and memory exercises included in the treatment session.  MAGUE Sharma 4/23/2017

## 2017-04-23 NOTE — PT/OT/SLP EVAL
Speech Language Pathology  Evaluation    Mary Singleton   MRN: [id number]   Admitting Diagnosis: Hypercalcemia    Diet recommendations: Solid Diet Level: Puree  Liquid Diet Level: Thin Feed only when awake/alert, HOB to 90 degrees, Small bites/sips, Alternating bites/sips, 1 bite/sip at a time, Check for pocketing/oral residue, Remain upright 30 minutes post meal, Meds crushed in puree and Assistance with meals    SLP Treatment Date: 17  Speech Start Time: 1410     Speech Stop Time: 1431     Speech Total (min): 21 min       TREATMENT BILLABLE MINUTES:  Eval Swallow and Oral Function 21    Diagnosis: Hypercalcemia    Hypercalcemia       sent by [facility] for hypercalcemia w/ lab work drawn yesterday, hx dementia         HPI: 89 y/o female with Hx of 1 HPTH s/p one parathyroid removal, CKDIV, HTN, hypothyroidism, Dementia sent by home health after her calcium levels from her blood work done yesterday was too high. Per family patient has increased weakness, confusion, decreased appetite. She has a Hx of similar hypercalcemia and gets drowsy and fatigued each time. Her daughter denies that she has had any fever, CP, SOB, nausea and vomiting. Currently no on hemodialysis.     NO recent CXR available for review.   Past Medical History:   Diagnosis Date    CKD (chronic kidney disease), stage IV     Dementia 2016    2012: Diagnosed with Alzheimer's Dementia.    HPTH (hyperparathyroidism)     Hypertension     Osteoporosis      Past Surgical History:   Procedure Laterality Date    parathyroid removal         Has the patient been evaluated by SLP for swallowing? : Yes  Keep patient NPO?: No   General Precautions: Standard, fall, aspiration          Prior diet: not known at this time.    Subjective:  Pt alert on arrival.  Sitter present at b/s.  Pt initially nonverbal; unable to rate pain.  Patient goals: pt unable to state 2' dementia.    Pain Ratin/10                   Objective:   Patient found with:  "SCD, telemetry, peripheral IV    Oral Musculature Evaluation  Oral Musculature: general weakness  Dentition: present and adequate  Mucosal Quality: good  Mandibular Strength and Mobility: WFL  Oral Labial Strength and Mobility: other (see comments)  Lingual Strength and Mobility: WFL  Volitional Swallow: unable to elicit  Voice Prior to PO Intake: clear     Bedside Swallow Eval:    Cognition:  The pt was alert, with eyes open, on arrival.  The pt responded to ST generally with short, automatic responses ("ok" etc).  She was noted to initiate "I'm cold" later in assessment.  ST noted pt to exhibit leaning towards L side; sitter stated that pt tended to lean towards this side ( kyphosis + leaning also documented in PT notes).  Pt also holding head positioned towards L; difficult to position pt fully upright.  Slowed, tremulous movements noted overall.  Pt was oriented to name; but not to time, place or situation.  Inconsistent command following noted, with delayed responses at times. Pt unable to elicit volitional cough or swallow.  Vocal quality was clear; also noted to be tremulous at times.   Consistencies Assessed: Thin via teaspoon (3 ml, 5 ml); cup (10 ml) unmeasured straw sips  Oral Phase:  The pt was observed with mild generalized weakness as well as tremulous oral movements at times.  Due to patient positioning (lean towards L) mild anterior loss was noted on L side of liquids from oral cavity.  The pt was able to achieve adequate labial seal for straw drinking without anterior loss.  No oral holding was observed of any consistency.  With teaspoons puree, delay in oral prep/mildly slowed oral transit noted, with very mild residue.   Prolonged oral prep of small pieces solid noted, with residue scattered on lingual surface.  Sips thin liquids alternating with puree/solids helped to clear oral cavity.    Pharyngeal Phase: ST palpated a consistent pharyngeal swallow with all attempts.  Pharyngeal swallow was " noted to be mildly delayed upon palpation, with fair hyolaryngeal excursion.  Although no overt s/s airway threat noted (no overt coughing, no overt choking, no change in vocal quality upon assessment), occasional mild repetitive throat clear was noted.  Sitter stated that pt consumed full liquid tray/thin liquids via straw with assistance during lunch meal with no episodes of coughing or change in vocal quality.                                   Assessment:  Mary Singleton is a 89 y.o. female with medical diagnoses of Hypercalcemia and dementia. Pt presents with mild oral and pharyngeal dysphagia, with delays noted overall and occasional mild throat clearing, but no overt s/s airway threat observed during this assessment.  It is felt that the pt could likely tolerate diet upgrade to puree with thin liquids.  She will require assistance with all meals.  It is recommended that ST continue to follow pt for ongoing assessment of diet safety and tolerance, with further recs to follow as appropriate.          Discharge recommendations:       Goals:   SLP Goals        Problem: SLP Goal    Goal Priority Disciplines Outcome   SLP Goal     SLP Ongoing (interventions implemented as appropriate)              Plan:   Patient to be seen Therapy Frequency: 5 x/week   Plan of Care expires: 05/23/17  Plan of Care reviewed with: patient, caregiver  SLP Follow-up?: Yes              Dominga Still CCC-SLP  04/23/2017

## 2017-04-23 NOTE — SUBJECTIVE & OBJECTIVE
Interval History: lost IV overnight; per bedside sitter patient slept well, participated with therapy yesterday; no N/V/D    Review of Systems   Unable to perform ROS: Dementia     Objective:     Vital Signs (Most Recent):  Temp: 98.5 °F (36.9 °C) (04/23/17 0340)  Pulse: 94 (04/23/17 0600)  Resp: 18 (04/23/17 0340)  BP: (!) 187/76 (04/23/17 0545)  SpO2: 96 % (04/23/17 0340) Vital Signs (24h Range):  Temp:  [98.1 °F (36.7 °C)-98.5 °F (36.9 °C)] 98.5 °F (36.9 °C)  Pulse:  [] 94  Resp:  [18-20] 18  SpO2:  [95 %-98 %] 96 %  BP: (154-187)/(65-77) 187/76     Weight: 56.7 kg (125 lb)  Body mass index is 21.46 kg/(m^2).    Intake/Output Summary (Last 24 hours) at 04/23/17 0808  Last data filed at 04/22/17 1600   Gross per 24 hour   Intake              120 ml   Output              300 ml   Net             -180 ml      Physical Exam   Constitutional: She appears well-developed and well-nourished. No distress.   HENT:   Head: Normocephalic and atraumatic.   Eyes: Conjunctivae and EOM are normal. Pupils are equal, round, and reactive to light. No scleral icterus.   Neck: Normal range of motion. Neck supple. No tracheal deviation present.   Cardiovascular: Normal rate, regular rhythm, normal heart sounds and intact distal pulses.  Exam reveals no gallop and no friction rub.    No murmur heard.  Pulmonary/Chest: Effort normal and breath sounds normal. No stridor. No respiratory distress. She has no wheezes. She has no rales.   Abdominal: Soft. Bowel sounds are normal. She exhibits no distension. There is no tenderness.   Musculoskeletal: Normal range of motion. She exhibits no deformity.   Left UE erythema   Neurological: She is alert.   Cranial nerves grossly intact.    Skin: Skin is warm and dry. No rash noted. She is not diaphoretic. No erythema. No pallor.   Psychiatric: She has a normal mood and affect. Her behavior is normal. Judgment and thought content normal.   Nursing note and vitals reviewed.      Significant  Labs:   CMP:   Recent Labs  Lab 04/22/17  0524 04/23/17  0558   * 135*   K 3.8 4.3    107   CO2 21* 19*    84   BUN 44* 41*   CREATININE 2.9* 2.9*   CALCIUM 10.6* 11.0*   ANIONGAP 8 9   EGFRNONAA 14* 14*     All pertinent labs within the past 24 hours have been reviewed.

## 2017-04-23 NOTE — PROGRESS NOTES
"Nephrology  Progress Note    Admit Date: 4/18/2017   LOS: 5 days     SUBJECTIVE:     Follow-up For:  Hypercalcemia    Interval History:     Remians very alert today and  back to her baseline mental status. She again participated well with therapies today and eat Full liq diet well.  Sitter at bedside.  Ionized ca 1.51 today.  Tolerating pills. Denies pain.    Review of Systems:    AMS but NAD    OBJECTIVE:     Vital Signs Range (Last 24H):  BP (!) 140/63 (BP Location: Left arm, Patient Position: Lying, BP Method: Automatic)  Pulse 99  Temp 98.3 °F (36.8 °C) (Oral)   Resp 16  Ht 5' 4" (1.626 m)  Wt 56.7 kg (125 lb)  SpO2 97%  Breastfeeding? No  BMI 21.46 kg/m2    Temp:  [98.1 °F (36.7 °C)-98.5 °F (36.9 °C)]   Pulse:  []   Resp:  [16-20]   BP: (140-187)/(63-77)   SpO2:  [95 %-98 %]     I & O (Last 24H):    Intake/Output Summary (Last 24 hours) at 04/23/17 1620  Last data filed at 04/23/17 1600   Gross per 24 hour   Intake              720 ml   Output              800 ml   Net              -80 ml       Physical Exam:  General appearance: elderly and ill appearing.  More alert but baseline confused.  Eyes:  Conjunctivae/corneas clear. PERRL.  Lungs: Normal respiratory effort,   clear to auscultation bilaterally   Heart: Irregular/Irregular rate and rhythm, S1, S2 normal, no murmur, rub or julio c.  Abdomen: Soft, non-tender non-distended; bowel sounds normal; no masses,  no organomegaly  Extremities: No cyanosis or clubbing. No edema.    Skin: Skin color, texture, turgor normal. No rashes or lesions  Neurologic: No focal numbness or weakness   Right Arm AVF:  +    Laboratory Data:  No results for input(s): WBC, RBC, HGB, HCT, PLT, MCV, MCH, MCHC in the last 24 hours.    BMP:     Recent Labs  Lab 04/19/17  0502  04/23/17  0558   GLU 83  < > 84   *  < > 135*   K 3.3*  < > 4.3     < > 107   CO2 25  < > 19*   BUN 75*  < > 41*   CREATININE 3.5*  < > 2.9*   CALCIUM 13.8*  < > 11.0*   MG 1.9  --   --  "   < > = values in this interval not displayed.  Lab Results   Component Value Date    .1 (H) 04/18/2017    CALCIUM 11.0 (H) 04/23/2017    CAION 1.51 (H) 04/23/2017    PHOS 2.5 (L) 04/22/2017             Medications:  Medication list was reviewed and changes noted under Assessment/Plan.    Diagnostic Results:        ASSESSMENT/PLAN:     88 YO WF with LLOYD on CKD4B, Alzheimer's dementia, primary hyperparathyroidism with symptomatic hypercalcemia and volume depletion.        1. Hypercalcemia: Slowly resolving.  Holding Calcitriol/D3.  Now off of nasal calcitonin  And seems to be tolerating sensipar. If sensipar not enough will see some rebound elevation in Ca level in few days. D/c IVFs.  2. LLOYD on CKD4B: Likely due to volume depletion/ hypercalcemia. No indication for HD. Had recent admission during which she required temporary HD, but she has since stablized and not required HD. She has an intact AVF. Labs pending this am. Cr nearing baseline.  3. HTN: Restarted oral Norvasc but using hydralazine PRN.   4. Alz Dementia: Exacerbated by hypercalcemia. Holding meds for now, but can likely resume. Place all four rails up to keep her free of falls. Encourage family to keep someone at bedside for redirection and ensure she doesn't get out of bed.    5. Hypothyroidism: On synthroid.  TSH/T4 wnl.  6. 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 Lazo following.  Considering Pacer.   7. Hypornatremia (E87.0): d/c IVF's.    8. Mild hypokalemia (E87.6):  resolved and monitor.    9. H/O CHF: seems compensated at present d/c IVF's.   10. Nutrition: needs ST consult and advancement of diet if safe.

## 2017-04-23 NOTE — ASSESSMENT & PLAN NOTE
- possibly 2.2 calcitonin dose increase recently  - IVF's per Nephrology, was given 1 L in ED  - monitor  - Nephrology following   hold calcitriol   continue sensipar dose at 60 BID 2/2 nausea at high doses   Nasal Calcitonin

## 2017-04-24 LAB
ANION GAP SERPL CALC-SCNC: 8 MMOL/L
BUN SERPL-MCNC: 40 MG/DL
CA-I BLDV-SCNC: 1.46 MMOL/L
CALCIUM SERPL-MCNC: 11.1 MG/DL
CHLORIDE SERPL-SCNC: 105 MMOL/L
CO2 SERPL-SCNC: 21 MMOL/L
CREAT SERPL-MCNC: 2.9 MG/DL
EST. GFR  (AFRICAN AMERICAN): 16 ML/MIN/1.73 M^2
EST. GFR  (NON AFRICAN AMERICAN): 14 ML/MIN/1.73 M^2
GLUCOSE SERPL-MCNC: 80 MG/DL
POTASSIUM SERPL-SCNC: 4.5 MMOL/L
SODIUM SERPL-SCNC: 134 MMOL/L

## 2017-04-24 PROCEDURE — 25000003 PHARM REV CODE 250: Performed by: PHYSICIAN ASSISTANT

## 2017-04-24 PROCEDURE — 97530 THERAPEUTIC ACTIVITIES: CPT

## 2017-04-24 PROCEDURE — 25000003 PHARM REV CODE 250: Performed by: INTERNAL MEDICINE

## 2017-04-24 PROCEDURE — 36415 COLL VENOUS BLD VENIPUNCTURE: CPT

## 2017-04-24 PROCEDURE — 11000001 HC ACUTE MED/SURG PRIVATE ROOM

## 2017-04-24 PROCEDURE — 82330 ASSAY OF CALCIUM: CPT

## 2017-04-24 PROCEDURE — 63600175 PHARM REV CODE 636 W HCPCS: Performed by: HOSPITALIST

## 2017-04-24 PROCEDURE — 99239 HOSP IP/OBS DSCHRG MGMT >30: CPT | Mod: ,,, | Performed by: PHYSICIAN ASSISTANT

## 2017-04-24 PROCEDURE — 80048 BASIC METABOLIC PNL TOTAL CA: CPT

## 2017-04-24 PROCEDURE — 97110 THERAPEUTIC EXERCISES: CPT

## 2017-04-24 PROCEDURE — 86580 TB INTRADERMAL TEST: CPT | Performed by: HOSPITALIST

## 2017-04-24 RX ORDER — POLYETHYLENE GLYCOL 3350 17 G/17G
17 POWDER, FOR SOLUTION ORAL DAILY
Refills: 0
Start: 2017-04-24

## 2017-04-24 RX ORDER — CINACALCET 60 MG/1
60 TABLET, FILM COATED ORAL 2 TIMES DAILY WITH MEALS
Start: 2017-04-24 | End: 2018-04-24

## 2017-04-24 RX ADMIN — LEVOTHYROXINE SODIUM 150 MCG: 25 TABLET ORAL at 06:04

## 2017-04-24 RX ADMIN — CINACALCET HYDROCHLORIDE 60 MG: 30 TABLET, COATED ORAL at 09:04

## 2017-04-24 RX ADMIN — TUBERCULIN PURIFIED PROTEIN DERIVATIVE 5 UNITS: 5 INJECTION, SOLUTION INTRADERMAL at 11:04

## 2017-04-24 RX ADMIN — HEPARIN SODIUM 5000 UNITS: 5000 INJECTION, SOLUTION INTRAVENOUS; SUBCUTANEOUS at 08:04

## 2017-04-24 RX ADMIN — POLYETHYLENE GLYCOL 3350 17 G: 17 POWDER, FOR SOLUTION ORAL at 09:04

## 2017-04-24 RX ADMIN — ASPIRIN 81 MG: 81 TABLET, COATED ORAL at 09:04

## 2017-04-24 RX ADMIN — HEPARIN SODIUM 5000 UNITS: 5000 INJECTION, SOLUTION INTRAVENOUS; SUBCUTANEOUS at 09:04

## 2017-04-24 RX ADMIN — AMLODIPINE BESYLATE 10 MG: 5 TABLET ORAL at 09:04

## 2017-04-24 RX ADMIN — CINACALCET HYDROCHLORIDE 60 MG: 30 TABLET, COATED ORAL at 06:04

## 2017-04-24 NOTE — PT/OT/SLP PROGRESS
Speech Language Pathology      Mary Singleton  MRN: [id number]    Patient not seen today secondary to pt sleeping. Daughter present who reports pt did not sleep well last night. Daughter reports pt tolerated pureed diet this date with no overt signs of difficulty and ate majority of meal, However dislike of pureed consistency  . Will follow-up later this date or tomorrow to assess ability to advance to soft solids.     Mer Goodman, JANINA-SLP

## 2017-04-24 NOTE — DISCHARGE SUMMARY
[facility] Hospital Medicine  Discharge Summary      Patient Name: Mary Singleton  MRN: [id number]  Admission Date: 4/18/2017  Hospital Length of Stay: 6 days  Discharge Date and Time:  04/24/2017 10:38 AM  Attending Physician: Rene Smith MD   Discharging Provider: Yee Paz PA-C  Primary Care Provider: Susana Arvizu MD      HPI:   89 y/o female with Hx of 1 HPTH s/p one parathyroid removal, CKDIV, HTN, hypothyroidism, Dementia sent by home health after her calcium levels from her blood work done yesterday was too high. Per family patient has increased weakness, confusion, decreased appetite. She has a Hx of similar hypercalcemia and gets drowsy and fatigued each time. Her daughter denies that she has had any fever, CP, SOB, nausea and vomiting.  Currently not on hemodialysis.     * No surgery found *      Indwelling Lines/Drains at time of discharge:   Lines/Drains/Airways     Drain                 Hemodialysis AV Fistula Right forearm -- days              Hospital Course:   Patient appeared to be volume depleated with elevated calcium and renal insufficiency upon admission and was started on IV fluids. Doses were adjusted to account for CHF. Repletion of lytes begun upon admission and is ongoing as per needs indicated by daily labs. She is being followed by Nephrology. On 4/19/2017 she began to develop heart block while being monitored on telemetry. Cardiology was consulted. A pacemaker would be reasonable with this degree of conduction disease; however, she has multiple medical issues including advanced dementia. Per Violet Benjamin, reasonable approach may be to place a pacemaker if she becomes ambulatory.Hypernatremia resolved. Hypercalcemia improving. Slowly resolving back to baseline.  Holding Calcitriol/D3.  Now off of nasal calcitonin, seems to be tolerating sensipar. Clinically improved, VSS, discharged to SNF.             Consults:   Consults         Status Ordering Provider      Inpatient consult to Cardiology  Once     Provider:  Adrien Lazo MD    Acknowledged CLARA GANDARA     Inpatient consult to Nephrology-[facility]  Once     Provider:  Susana Arvizu MD    Completed TAI BELL          Significant Diagnostic Studies: Labs:   CMP   Recent Labs  Lab 04/23/17  0558 04/24/17  0337   * 134*   K 4.3 4.5    105   CO2 19* 21*   GLU 84 80   BUN 41* 40*   CREATININE 2.9* 2.9*   CALCIUM 11.0* 11.1*   ANIONGAP 9 8   ESTGFRAFRICA 16* 16*   EGFRNONAA 14* 14*     Radiology:   Imaging Results         US Upper Extremity Veins Left (Final result) Result time:  04/23/17 14:51:35    Final result by Marcin Sandoval MD (04/23/17 14:51:35)    Impression:     No evidence of left upper extremity deep venous thrombosis..      Electronically signed by: MARCIN SANDOAVL MD  Date:     04/23/17  Time:    14:51     Narrative:    Comparison: None.    Findings: Duplex scan of the left upper extremity was performed using B. mode/grayscale imaging and Doppler spectral analysis and color-flow.  The left internal jugular, subclavian, axillary, brachial, basilic, and cephalic demonstrate normal Doppler color flow and compressibility.  No fluid collections identified.              Pending Diagnostic Studies:     None        Final Active Diagnoses:    Diagnosis Date Noted POA    PRINCIPAL PROBLEM:  Hypercalcemia [E83.52] 03/06/2017 Yes    Chronic kidney disease, stage IV (severe) [N18.4] 07/25/2013 Yes    LLOYD (acute kidney injury) [N17.9] 04/18/2017 Yes    Encephalopathy, metabolic [G93.41] 04/23/2017 Yes    Left arm pain [M79.602] 04/23/2017 No    Mobitz type 1 second degree atrioventricular block [I44.1] 04/20/2017 Yes    Constipation [K59.00] 04/19/2017 Yes    Generalized weakness [R53.1] 03/06/2017 Yes    Dementia [F03.90] 11/02/2016 Yes    Hyperparathyroidism [E21.3] 07/25/2013 Yes    Hypertension [I10] 07/25/2013 Yes      Problems Resolved During this Admission:    Diagnosis Date Noted  Date Resolved POA    Hypernatremia [E87.0] 04/18/2017 04/21/2017 Yes      * Hypercalcemia  - possibly 2.2 calcitonin dose increase recently  - IVF's per Nephrology, was given 1 L in ED  - hold calcitriol, continue sensipar dose at 60 BID 2/2 nausea at high doses          Chronic kidney disease, stage IV (severe)  - likely d/t volume depletion  - improved from admission Cr at 2.9 plateau, slowly resolving  patient has had one round of HD previous admission, Renal fxn had stabilized and HD is on hold per family  - close follow up with Nephrology        LLOYD (acute kidney injury)  - slowly resolving  - likely 2/2 volume depletion  - no need for HD according to nephrology         Hypertension  - on amlodipine 10 mg           Hyperparathyroidism  - currently on sensipar at home  - calcitriol being hold      Dementia  - 2/2 Alzheimers   - worsened 2/2 hypercalcemia          Generalized weakness  - PT/ OT        Constipation  - bisacodyl suppository ordered & miralax       Mobitz type 1 second degree atrioventricular block  - Seen by Cardiology (Dr. Lazo): consider pacer when mental statues improves and not bedridden   - per family, longstanding      Encephalopathy, metabolic  - d/t hypercalcemia on dementia  - improving      Left arm pain  - erythema/ pain to LUE  - doppler negative for DVT        Discharged Condition: stable    Disposition: Skilled Nursing Facility    Follow Up:  Follow-up Information     Follow up with Susana Arvizu MD In 1 week.    Specialty:  Nephrology    Contact information:    [address] [address]  [phone]          Patient Instructions:     Diet general     Activity as tolerated     Call MD for:  temperature >100.4     Call MD for:  persistent nausea and vomiting or diarrhea     Call MD for:  increased confusion or weakness       Medications:  Reconciled Home Medications:   Current Discharge Medication List      START taking these medications    Details    polyethylene glycol (GLYCOLAX) 17 gram PwPk Take 17 g by mouth once daily.  Refills: 0         CONTINUE these medications which have CHANGED    Details   cinacalcet (SENSIPAR) 60 MG Tab Take 1 tablet (60 mg total) by mouth 2 (two) times daily with meals.         CONTINUE these medications which have NOT CHANGED    Details   amlodipine (NORVASC) 10 MG tablet Take 10 mg by mouth once daily.      aspirin (ECOTRIN) 81 MG EC tablet Take 1 tablet (81 mg total) by mouth once daily.  Qty: 90 tablet, Refills: 3    Associated Diagnoses: Bilateral carotid artery stenosis      cetirizine (ZYRTEC) 5 MG tablet Take 5 mg by mouth once daily.      donepezil (ARICEPT) 10 MG tablet Take 10 mg by mouth once daily.       levothyroxine (SYNTHROID) 150 MCG tablet Take 150 mcg by mouth once daily.      quetiapine (SEROQUEL) 25 MG Tab Take 25 mg by mouth every evening.          STOP taking these medications       calcitRIOL (ROCALTROL) 0.5 MCG Cap Comments:   Reason for Stopping:         fluoxetine (PROZAC) 20 MG capsule Comments:   Reason for Stopping:         megestrol (MEGACE) 40 MG Tab Comments:   Reason for Stopping:         flu vacc [id number] 65yr up,PF, (FLUZONE HIGH-DOSE 2016-17, PF,) 180 mcg/0.5 mL Syrg Comments:   Reason for Stopping:             Time spent on the discharge of patient: > 30 minutes    Yee Paz PA-C  Department of Hospital Medicine  [facility]

## 2017-04-24 NOTE — SUBJECTIVE & OBJECTIVE
Interval History: uneventful night    Review of Systems   Unable to perform ROS: Dementia     Objective:     Vital Signs (Most Recent):  Temp: 98 °F (36.7 °C) (04/24/17 1214)  Pulse: 96 (04/24/17 1400)  Resp: 18 (04/24/17 1214)  BP: 136/60 (04/24/17 1214)  SpO2: 96 % (04/24/17 1200) Vital Signs (24h Range):  Temp:  [97.8 °F (36.6 °C)-98.3 °F (36.8 °C)] 98 °F (36.7 °C)  Pulse:  [] 96  Resp:  [16-20] 18  SpO2:  [95 %-97 %] 96 %  BP: (136-176)/(60-87) 136/60     Weight: 56.7 kg (125 lb)  Body mass index is 21.46 kg/(m^2).    Intake/Output Summary (Last 24 hours) at 04/24/17 1456  Last data filed at 04/24/17 1200   Gross per 24 hour   Intake              720 ml   Output              800 ml   Net              -80 ml      Physical Exam   Constitutional: She appears well-developed and well-nourished. No distress.   HENT:   Head: Normocephalic and atraumatic.   Eyes: Conjunctivae and EOM are normal. Pupils are equal, round, and reactive to light. No scleral icterus.   Neck: Normal range of motion. Neck supple. No tracheal deviation present.   Cardiovascular: Normal rate, regular rhythm, normal heart sounds and intact distal pulses.  Exam reveals no gallop and no friction rub.    No murmur heard.  Pulmonary/Chest: Effort normal and breath sounds normal. No stridor. No respiratory distress. She has no wheezes. She has no rales.   Abdominal: Soft. Bowel sounds are normal. She exhibits no distension. There is no tenderness.   Musculoskeletal: Normal range of motion. She exhibits no deformity.   Left UE erythema   Neurological: She is alert.   Cranial nerves grossly intact.    Skin: Skin is warm and dry. No rash noted. She is not diaphoretic. No erythema. No pallor.   Psychiatric: She has a normal mood and affect. Her behavior is normal. Judgment and thought content normal.   Nursing note and vitals reviewed.      Significant Labs:   BMP:   Recent Labs  Lab 04/24/17  0337   GLU 80   *   K 4.5      CO2 21*   BUN  40*   CREATININE 2.9*   CALCIUM 11.1*     All pertinent labs within the past 24 hours have been reviewed.

## 2017-04-24 NOTE — PLAN OF CARE
1:38 PM   CXR CO for DC to SNF and TB test administered, marked with indelible ink. Patient is hopeful for DC, to SNF- Today.    4:37 PM   Resting comfortably in bed at this time.  VSS on RA and afebrile this shift.  Telemetry monitored wo event.  Free from CP/SOB.  Tolerating PT/OT.  Good appetite and good UOP this shift, incontinent care provided x3 this shift.  Repositioned frequently in bed- wedge in use.  Free from injury or skin breakdown; Fall precautions maintained and call light in reach.    POC updated questions answered and comments acknowledged.  Purposeful hourly rounding completed this shift.  Daughter has no intention of having a plan in place for DC to SNF- Insurance does not cover.

## 2017-04-24 NOTE — NURSING
04/22/17 2200   Gastrointestinal   Stool Amount Large   Stool Appearance/Consistency Formed   Stool Color Brown   Stool Occurrence 1   10:04 AM  Last BM documentation for Discharge to SNF.

## 2017-04-24 NOTE — PROGRESS NOTES
"Nephrology  Progress Note    Admit Date: 4/18/2017   LOS: 6 days     SUBJECTIVE:     Follow-up For:  Hypercalcemia    Interval History:     Uneventful night.  Remains more awake/alert.  Denies CP/SOB.  "I want to go home".  Discussed with grandson and Dr. Smith.  Renal fxn and calcium stable.        OBJECTIVE:     Vital Signs Range (Last 24H):  BP (!) 142/74 (BP Location: Left arm, Patient Position: Lying, BP Method: Automatic)  Pulse 88  Temp 98.2 °F (36.8 °C) (Oral)   Resp 18  Ht 5' 4" (1.626 m)  Wt 56.7 kg (125 lb)  SpO2 95%  Breastfeeding? No  BMI 21.46 kg/m2    Temp:  [97.8 °F (36.6 °C)-98.3 °F (36.8 °C)]   Pulse:  [66-99]   Resp:  [16-20]   BP: (140-176)/(63-87)   SpO2:  [95 %-97 %]     I & O (Last 24H):    Intake/Output Summary (Last 24 hours) at 04/24/17 0840  Last data filed at 04/23/17 2139   Gross per 24 hour   Intake              720 ml   Output             1200 ml   Net             -480 ml       Physical Exam:  General appearance: elderly and ill appearing.  More alert but baseline confused.  Eyes:  Conjunctivae/corneas clear. PERRL.  Lungs: Normal respiratory effort,   clear to auscultation bilaterally   Heart: Irregular/Irregular rate and rhythm, S1, S2 normal, no murmur, rub or julio c.  Abdomen: Soft, non-tender non-distended; bowel sounds normal; no masses,  no organomegaly  Extremities: No cyanosis or clubbing. No edema.    Skin: Skin color, texture, turgor normal. No rashes or lesions  Neurologic: No focal numbness or weakness   Right Arm AVF:  +    Laboratory Data:  No results for input(s): WBC, RBC, HGB, HCT, PLT, MCV, MCH, MCHC in the last 24 hours.    BMP:     Recent Labs  Lab 04/19/17  0502  04/24/17  0337   GLU 83  < > 80   *  < > 134*   K 3.3*  < > 4.5     < > 105   CO2 25  < > 21*   BUN 75*  < > 40*   CREATININE 3.5*  < > 2.9*   CALCIUM 13.8*  < > 11.1*   MG 1.9  --   --    < > = values in this interval not displayed.  Lab Results   Component Value Date    .1 (H) " 04/18/2017    CALCIUM 11.1 (H) 04/24/2017    CAION 1.46 (H) 04/24/2017    PHOS 2.5 (L) 04/22/2017             Medications:  Medication list was reviewed and changes noted under Assessment/Plan.    Diagnostic Results:        ASSESSMENT/PLAN:     88 YO WF with LLOYD on CKD4B, Alzheimer's dementia, primary hyperparathyroidism with symptomatic hypercalcemia and volume depletion.        1. Hypercalcemia: Slowly resolving back to baseline.  Holding Calcitriol/D3.  Now off of nasal calcitonin  And seems to be tolerating sensipar.    2. Resolving LLOYD on CKD4B: Likely due to volume depletion/ hypercalcemia. No indication for HD. Had recent admission during which she required temporary HD, but she has since stablized and not required HD. She has an intact AVF. Labs pending this am. Cr nearing baseline.  3. HTN: Restarted oral Norvasc but using hydralazine PRN.   4. Alz Dementia: Exacerbated by hypercalcemia. Holding meds for now, but can likely resume but not sure of overall benefit from it. Place all four rails up to keep her free of falls. Encourage family to keep someone at bedside for redirection and ensure she doesn't get out of bed.    5. Hypothyroidism: On synthroid.  TSH/T4 wnl.  6. 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 Lazo following.  Considering Pacer.   7. Hypornatremia (E87.0): d/c IVF's.    8. Mild hypokalemia (E87.6):  resolved and monitor.    9. H/O CHF: seems compensated at present d/c IVF's.   10. Nutrition: await ST eval.  Appetite improving.      Ok to DC with close f/u with Dr. Arvizu/Yvonne of renal/corrie monitoring.

## 2017-04-24 NOTE — PLAN OF CARE
[facility]  [address] [place]. [address]    Facility Transfer Orders                        04/25/2017    Admit to: SNF    Diagnoses:  Active Hospital Problems    Diagnosis  POA    *Hypercalcemia [E83.52]  Yes     Priority: 1 - High    Chronic kidney disease, stage IV (severe) [N18.4]  Yes     Priority: 2      11/15/2012: BUN/crea 38/2.1. CrCl 22.  Right arm fistula.      LLOYD (acute kidney injury) [N17.9]  Yes     Priority: 3     Encephalopathy, metabolic [G93.41]  Yes    Left arm pain [M79.602]  No    Mobitz type 1 second degree atrioventricular block [I44.1]  Yes    Constipation [K59.00]  Yes    Generalized weakness [R53.1]  Yes    Dementia [F03.90]  Yes     2012: Diagnosed with Alzheimer's Dementia.      Hyperparathyroidism [E21.3]  Yes     2010: Diagnosed.      Hypertension [I10]  Yes     2000: Diagnosed.        Resolved Hospital Problems    Diagnosis Date Resolved POA    Hypernatremia [E87.0] 04/21/2017 Yes       Allergies:  Review of patient's allergies indicates:   Allergen Reactions    Penicillins      Doesn't remember it has been years       Vitals:       Every shift (Skilled Nursing patients)    Diet:  Solid Diet Level: Puree Liquid Diet Level: Thin Feed only when awake/alert, HOB to 90 degrees, Small bites/sips, Alternating bites/sips, 1 bite/sip at a time, Check for pocketing/oral residue, Remain upright 30 minutes post meal, Meds crushed in puree and Assistance with meals    Supplement:  1 can every three times a day with meals                         Type:  House         Activity:      - Up in a chair each morning as tolerated   - Ambulate with assistance to bathroom      Nursing Precautions:     - Aspiration precautions:             - Total assistance with meals            -  Upright 90 degrees befor during and after meals             -  Suction at bedside          - Fall precautions   - Decubitus precautions:        -  for positioning   -  Pressure reducing foam mattress   - Turn patient every two hours. Use wedge pillows to anchor patient    CONSULTS:      PT to evaluate and treat - five times a week     OT to evaluate and treat - five times a week    LABS:  Basic metabolic panel in 1 week  Fax to [phone] Dr. Arvizu    Medications: Discontinue all previous medication orders, if any. See new list below.    Please do not adjust any medications without notifying Dr. Arvizu @ [phone]     Medication List      START taking these medications          polyethylene glycol 17 gram Pwpk   Commonly known as:  GLYCOLAX   Take 17 g by mouth once daily.         CHANGE how you take these medications          cinacalcet 60 MG Tab   Commonly known as:  SENSIPAR   Take 1 tablet (60 mg total) by mouth 2 (two) times daily with meals.   What changed:    - how much to take  - when to take this         CONTINUE taking these medications          amlodipine 10 MG tablet  Take one tablet by mouth daily.    Commonly known as:  NORVASC       aspirin 81 MG EC tablet   Commonly known as:  ECOTRIN   Take 1 tablet (81 mg total) by mouth once daily.       cetirizine 5 MG tablet  Take one tablet by mouth daily.    Commonly known as:  ZYRTEC       donepezil 10 MG tablet  Take one table by mouth daily.    Commonly known as:  ARICEPT       levothyroxine 150 MCG tablet  Take one table by mouth before breakfast every day.    Commonly known as:  SYNTHROID           quetiapine 25 MG Tab  Take one tablet by mouth every evening.    Commonly known as:  SEROQUEL         STOP taking these medications          calcitRIOL 0.5 MCG Cap   Commonly known as:  ROCALTROL       fluoxetine 20 MG capsule   Commonly known as:  PROZAC       FLUZONE HIGH-DOSE 2016-17 (PF) 180 mcg/0.5 mL Syrg   Generic drug:  flu vacc [id number] 65yr up(PF)       megestrol 40 MG Tab   Commonly known as:  MEGACE            Where to Get Your Medications      Information about where to get these medications is not yet  available     ! Ask your nurse or doctor about these medications     cinacalcet 60 MG Tab    polyethylene glycol 17 gram Pwpk                   _________________________________  Sheryl Galo PA-C   04/25/2017

## 2017-04-24 NOTE — PLAN OF CARE
CM met with daughter to discuss alternate discharge plan in case [employer] does not authorize SNF. Pt's daughter, Dai, [phone], who states she has no alternate plan. Daughter states Dr. Arvizu and therapists recommend SNF. CM tried to explain that approval depends on her [employer] Insurance. Daughter encouraged to develop alternate plan so that she is prepared in case insurance does not approve admission to SNF.     04/24/17 1546   Discharge Assessment   Assessment Type Discharge Planning Reassessment   Discharge Plan A Skilled Nursing Facility   Discharge Plan B Skilled Nursing Facility   Patient/Family In Agreement With Plan yes

## 2017-04-24 NOTE — PT/OT/SLP PROGRESS
"Occupational Therapy  Treatment    Mary Singleton   MRN: [id number]   Admitting Diagnosis: Hypercalcemia    OT Date of Treatment: 04/24/17   OT Start Time: 1004  OT Stop Time: 1021  OT Total Time (min): 17 min    Billable Minutes:  Therapeutic Activity 17    General Precautions: Standard, aspiration, fall, blind, hearing impaired  Orthopedic Precautions: N/A  Braces: N/A    Do you have any cultural, spiritual, Mormonism conflicts, given your current situation?: [employer]ian    Subjective:  Communicated with RN prior to session.  Pt found supine in bed. Pt arousable. Pt reported "I just want to lie down" regarding sitting EOB.     Pain Rating:  (Pain reactions with any touch and movement, unable to generalize location and number)      Pain Rating Post-Intervention:  (UT: Pt could not describe pain)    Objective:  Patient found with: SCD, telemetry, peripheral IV     Functional Mobility:  Bed Mobility:  Rolling/Turning Right: Total assistance  Scooting/Bridging: Total Assistance, With assist of 2  Supine to Sit: Maximum Assistance  Sit to Supine: Minimum Assistance    Transfers:    None    Functional Ambulation: None    Activities of Daily Living:  Feeding Level of Assistance: Total assistance (to drink coffee, eat grits)  Grooming Position: EOB, Seated  Grooming Level of Assistance: Total assistance (to wash hands and face)    Balance:   Static Sit: POOR: Needs MODERATE assist to maintain  Dynamic Sit: POOR: N/A  Static Stand: NT  Dynamic stand: NT    Therapeutic Activities and Exercises:  Bed mobility, sitting balance and tolerance    AM-PAC 6 CLICK ADL   How much help from another person does this patient currently need?   1 = Unable, Total/Dependent Assistance  2 = A lot, Maximum/Moderate Assistance  3 = A little, Minimum/Contact Guard/Supervision  4 = None, Modified [place]/Independent    Putting on and taking off regular lower body clothing? : 1  Bathing (including washing, rinsing, drying)?: 1  Toileting, " which includes using toilet, bedpan, or urinal? : 1  Putting on and taking off regular upper body clothing?: 2  Taking care of personal grooming such as brushing teeth?: 2  Eating meals?: 2  Total Score: 9     AM-PAC Raw Score CMS G-Code Modifier Level of Impairment Assistance   6 % Total / Unable   7 - 9 CM 80 - 100% Maximal Assist   10 - 14 CL 60 - 80% Moderate Assist   15 - 19 CK 40 - 60% Moderate Assist   20 - 22 CJ 20 - 40% Minimal Assist   23 CI 1-20% SBA / CGA   24 CH 0% Independent/ Mod I     Patient left supine with all lines intact, call button in reach and bed alarm on    ASSESSMENT:  Mary Singleton is a 89 y.o. female with a medical diagnosis of Hypercalcemia and presents with weakness, impaired balance and impaired endurance. Pt found asleep but arousable. Pt hyperesponsive to tactile stimuli. Pt required Maximum Assistance for supine<>sit and scooting to EOB. Pt sat EOB with fluctuating SBA-Moderate Assistance to maintain. Pt required [place] assistance to eat and perform grooming. Pt resisted [place] and required Total Assistance. Pt required Moderate Assistance to return to supine and Total Assistance to scoot to HOB. Pt rolled to the right with Total Assistance. Pt would benefit from continued OT services to improve safety and independence in ADLs.     Rehab identified problem list/impairments: Rehab identified problem list/impairments: weakness, impaired balance, decreased safety awareness, impaired endurance, pain, gait instability    Rehab potential is fair.    Activity tolerance: Fair    Discharge recommendations: Discharge Facility/Level Of Care Needs: nursing facility, skilled (if discharged home, pt needs 24 hour care)     Barriers to discharge: Barriers to Discharge: Decreased caregiver support    Equipment recommendations:  (TBD)     GOALS:   Occupational Therapy Goals        Problem: Occupational Therapy Goal    Goal Priority Disciplines Outcome Interventions   Occupational Therapy  Goal     OT, PT/OT Ongoing (interventions implemented as appropriate)    Description:  Goals to be met by: 4/30/2017    Patient will increase functional independence with ADLs by performing:    Feeding with Stand-by Assistance.  Grooming while seated with Stand-by Assistance.  Toileting from bedside commode with Moderate Assistance for hygiene and clothing management.   Rolling to Bilateral with Contact Guard Assistance.   Supine to sit with Minimal Assistance.  Toilet transfer to bedside commode with Moderate Assistance.  Increased functional strength to 4-/5 for RUE; LUE elbow and hand 4-/5.  Upper extremity exercise program 10 reps per handout, with assistance as needed.                Plan:  Patient to be seen 6 x/week to address the above listed problems via self-care/home management, therapeutic activities, therapeutic exercises, neuromuscular re-education, cognitive retraining  Plan of Care expires: 05/20/17  Plan of Care reviewed with: patient         Paloma Mary, GLADIS  04/24/2017

## 2017-04-24 NOTE — PROGRESS NOTES
[facility]  Cardiology  Progress Note    Patient Name: Mary Singleton  MRN: [id number]  Admission Date: 4/18/2017  Hospital Length of Stay: 6 days  Code Status: Full Code   Attending Physician: Rene Smith MD   Primary Care Physician: Susana Arvizu MD  Expected Discharge Date:   Principal Problem:Hypercalcemia    Subjective:     Brief HPI:    Mary Singleton is a 89 y.o. female with hypertension. She has severe carotid disease having undergone left CEA in 2002. She has late onset quite advanced Alzheimer's dementia. She has hypothyroidism, hyperparathyroidism and stage four chronic kidney disease. She has a right arm fistula that was briefly used a few months ago.     She presents with further changes in mental status and is found to be hypercalcemic. She is on telemetry and today had episodes of Second degree AV block, Mobitz 1 with 2:1 AV conduction with a heart rate in 40s. She is currently bedridden and not able to give any history.     Hospital Course:     Supportive care.    On telemetry.    Interval History:     Mental status has improved but still very confused. No distress. No further bradycardia.    Review of Systems   Cardiovascular: Negative for chest pain and syncope.   Respiratory: Negative for cough and shortness of breath.      Objective:     Vital Signs (Most Recent):  Temp: 98.2 °F (36.8 °C) (04/24/17 0726)  Pulse: 96 (04/24/17 0800)  Resp: 18 (04/24/17 0726)  BP: (!) 142/74 (04/24/17 0726)  SpO2: 95 % (04/24/17 0726) Vital Signs (24h Range):  Temp:  [97.8 °F (36.6 °C)-98.3 °F (36.8 °C)] 98.2 °F (36.8 °C)  Pulse:  [66-99] 96  Resp:  [16-20] 18  SpO2:  [95 %-97 %] 95 %  BP: (140-176)/(63-87) 142/74     Weight: 56.7 kg (125 lb)  Body mass index is 21.46 kg/(m^2).    SpO2: 95 %  O2 Device (Oxygen Therapy): room air      Intake/Output Summary (Last 24 hours) at 04/24/17 0924  Last data filed at 04/23/17 2139   Gross per 24 hour   Intake              480 ml   Output               800 ml   Net             -320 ml       Lines/Drains/Airways     Drain                 Hemodialysis AV Fistula Right forearm -- days                Physical Exam   Constitutional: She appears well-developed. She appears ill. No distress.   HENT:   Head: Normocephalic and atraumatic.   Nose: Nose normal.   Neck: No JVD present.   Cardiovascular: Normal rate, regular rhythm, S1 normal and S2 normal.  Exam reveals gallop and S4.    Pulmonary/Chest: Effort normal and breath sounds normal.   Abdominal: Normal appearance. There is no tenderness.   Musculoskeletal:        Right ankle: She exhibits no swelling.        Left ankle: She exhibits no swelling.   Neurological: She is disoriented.   Skin: Skin is warm and dry. No rash noted.     Current Medications:     amlodipine  10 mg Oral Daily    aspirin  81 mg Oral Daily    bisacodyl  10 mg Rectal Once    cinacalcet  60 mg Oral BID WM    heparin (porcine)  5,000 Units Subcutaneous Q12H    levothyroxine  150 mcg Oral Before breakfast    polyethylene glycol  17 g Oral Daily     Current Laboratory Results:    Recent Results (from the past 24 hour(s))   Basic metabolic panel    Collection Time: 04/24/17  3:37 AM   Result Value Ref Range    Sodium 134 (L) 136 - 145 mmol/L    Potassium 4.5 3.5 - 5.1 mmol/L    Chloride 105 95 - 110 mmol/L    CO2 21 (L) 23 - 29 mmol/L    Glucose 80 70 - 110 mg/dL    BUN, Bld 40 (H) 8 - 23 mg/dL    Creatinine 2.9 (H) 0.5 - 1.4 mg/dL    Calcium 11.1 (H) 8.7 - 10.5 mg/dL    Anion Gap 8 8 - 16 mmol/L    eGFR if African American 16 (A) >60 mL/min/1.73 m^2    eGFR if non African American 14 (A) >60 mL/min/1.73 m^2   Calcium, ionized    Collection Time: 04/24/17  3:37 AM   Result Value Ref Range    Calcium, Ion 1.46 (H) 1.06 - 1.42 mmol/L     Current Imaging Results:    Imaging Results         US Upper Extremity Veins Left (Final result) Result time:  04/23/17 14:51:35    Final result by Avila Metz MD (04/23/17 14:51:35)    Impression:      No evidence of left upper extremity deep venous thrombosis..      Electronically signed by: MARCIN SANDOVAL MD  Date:     04/23/17  Time:    14:51     Narrative:    Comparison: None.    Findings: Duplex scan of the left upper extremity was performed using B. mode/grayscale imaging and Doppler spectral analysis and color-flow.  The left internal jugular, subclavian, axillary, brachial, basilic, and cephalic demonstrate normal Doppler color flow and compressibility.  No fluid collections identified.              Assessment and Plan:     Problem List:    Active Diagnoses:    Diagnosis Date Noted POA    PRINCIPAL PROBLEM:  Hypercalcemia [E83.52] 03/06/2017 Yes    Chronic kidney disease, stage IV (severe) [N18.4] 07/25/2013 Yes    Dementia [F03.90] 11/02/2016 Yes    Hypertension [I10] 07/25/2013 Yes    Hyperparathyroidism [E21.3] 07/25/2013 Yes    Encephalopathy, metabolic [G93.41] 04/23/2017 Yes    Left arm pain [M79.602] 04/23/2017 No    Mobitz type 1 second degree atrioventricular block [I44.1] 04/20/2017 Yes    Constipation [K59.00] 04/19/2017 Yes    LLOYD (acute kidney injury) [N17.9] 04/18/2017 Yes    Generalized weakness [R53.1] 03/06/2017 Yes      Problems Resolved During this Admission:    Diagnosis Date Noted Date Resolved POA    Hypernatremia [E87.0] 04/18/2017 04/21/2017 Yes     Assessment and Plan:    1. Atrioventricular Block, Second Degree, Mobitz 1               4/19/2017: With 2:1 AV block.              Stopped donepezil but doubt cause.              A pacemaker would be reasonable with this degree of conduction disease.              However she has multiple medical issues including advanced dementia.              A reasonable approach may be to place a pacemaker if she becomes ambulatory.     2. Carotid Artery Stenosis              2002: Left CEA.              10/26/2016: Carotid Duplex: GABBIE: Moderate plaquing - 1.9 m/s - 60-70%. LCCA: Proximal 1.7 m/s - 50-70%. LICA: Moderate plaquing -  <50%.              See no need for further follow up at this age with dementia.              On aspirin 81 mg Q24.              Stable.     3. Hypertension              2000: Diagnosed.              On amlodipine 2.5 mg Q24 and lisinopril 20 mg Q24.     4. Chronic Kidney Disease, Stage 4              11/15/2012: BUN/crea 38/2.1. CrCl 22.              Right arm fistula.              Dr. Susana Arvizu.     5. Dementia              2012: Diagnosed with Alzheimer's Dementia.              Now advanced.              Dr. Dereck Patel.     6. Hyperparathyroidism              2010: Diagnosed.     7. Hypothyroidism              1990: Diagnosed.              On levothyroxine 150 mcg Q24.     8. Primary Care              Dr. Susana Arvizu.        VTE Risk Mitigation         Ordered     heparin (porcine) injection 5,000 Units  Every 12 hours     Route:  Subcutaneous        04/23/17 1614     Medium Risk of VTE  Once      04/18/17 1717     Place sequential compression device  Until discontinued      04/18/17 1717     Place BARBARA hose  Until discontinued      04/18/17 1717          Adrien Lazo MD  Cardiology  [facility]

## 2017-04-24 NOTE — PLAN OF CARE
Problem: Patient Care Overview  Goal: Plan of Care Review  Outcome: Ongoing (interventions implemented as appropriate)  No significant events overnight. Free from falls, injury, and skin breakdown. VSS with mild hypertension noted. TEDs/SCDs maintained. Serum calcium levels noted stable this am. Urine output adequate and incontinence care provided as needed. Turned/Repositioned as needed. Grandson at bedside throughout the night. Purposeful rounding performed every one to two hours throughout shift and safety precautions in place and ongoing. Bed alarm activated and side rails up x 4.

## 2017-04-24 NOTE — ASSESSMENT & PLAN NOTE
- possibly 2.2 calcitonin dose increase recently  - IVF's per Nephrology, was given 1 L in ED  - hold calcitriol, continue sensipar dose at 60 BID 2/2 nausea at high doses

## 2017-04-24 NOTE — ASSESSMENT & PLAN NOTE
- likely d/t volume depletion  - improved from admission Cr at 2.9 plateau, slowly resolving  patient has had one round of HD previous admission, Renal fxn had stabilized and HD is on hold per family  - close follow up with Nephrology

## 2017-04-24 NOTE — DISCHARGE INSTRUCTIONS
Thank you for choosing [facility] as your Health Care Provider. [facility] strives to provide the best healthcare available to you. In the next few days you may receive a Survey, either by mail or email,  asking you to rate our care that was provided to you during your stay.  Please return the survey to us, as your feedback is important. We aim to meet your expectations of safe, quality health care.    From your [facility] Team.

## 2017-04-24 NOTE — PHYSICIAN QUERY
"PT Name: Mary Singleton  MR #: [id number]    Physician Query Form - Heart  Condition Clarification       CDS: Filomena Gallo RN, CCDS       Contact information: [email or web address]     This form is a permanent document in the medical record.     Query Date: April 24, 2017    By submitting this query, we are merely seeking further clarification of documentation. Please utilize your independent clinical judgment when addressing the question(s) below.    The medical record contains the following   Indicators     Supporting Clinical Findings Location in Medical Record    BNP     X EF EF=70%     Radiology findings     X Echo Results Diastolic Dysfunction-No  Hyperdynamic left ventricular systolic function 4/20-ECHO    "Ascites" documented      "SOB" or "PASTRANA" documented      "Hypoxia" documented     X Heart Failure documented "H/O CHF: seems compensated at present d/c IVF's." 4/24-Nephro PN    "Edema" documented      Diuretics/Meds      Treatment:     X Other:  "Patient appeared to be volume depleated with elevated calcium and renal insufficiency upon admission and was started on IV fluids. Doses were adjusted to account for CHF." 4/23-Hosp Med PN       Provider, please specify diagnosis or diagnoses associated with above clinical findings.    [  ] Chronic Systolic Heart Failure (EF < 40)*  [  ] Chronic Diastolic Heart Failure (EF > 40)*  [  ] Chronic Combined Systolic and Diastolic Heart Failure  [  ] Other Type of Heart Failure (please specify type): _________________________  [X ] Heart Failure Ruled Out  [  ] Other (please specify): ___________________________________  [  ] Clinically Undetermined            *American Heart Association                                                                                                          Please document in your progress notes daily for the duration of treatment until resolved and include in your discharge summary.    "

## 2017-04-24 NOTE — PLAN OF CARE
Problem: Occupational Therapy Goal  Goal: Occupational Therapy Goal  Goals to be met by: 4/30/2017    Patient will increase functional independence with ADLs by performing:    Feeding with Stand-by Assistance.  Grooming while seated with Stand-by Assistance.  Toileting from bedside commode with Moderate Assistance for hygiene and clothing management.   Rolling to Bilateral with Contact Guard Assistance.   Supine to sit with Minimal Assistance.  Toilet transfer to bedside commode with Moderate Assistance.  Increased functional strength to 4-/5 for RUE; LUE elbow and hand 4-/5.  Upper extremity exercise program 10 reps per handout, with assistance as needed.   Outcome: Ongoing (interventions implemented as appropriate)  Pt found asleep but arousable. Pt hyperesponsive to tactile stimuli. Pt required Maximum Assistance for supine<>sit and scooting to EOB. Pt sat EOB with fluctuating SBA-Moderate Assistance to maintain. Pt required [place] assistance to eat and perform grooming. Pt resisted [place] and required Total Assistance. Pt required Moderate Assistance to return to supine and Total Assistance to scoot to HOB. Pt rolled to the right with Total Assistance. Pt would benefit from continued OT services to improve safety and independence in ADLs.      GLADIS Woods

## 2017-04-24 NOTE — PROGRESS NOTES
[facility] Hospital Medicine  Progress Note    Patient Name: Mary Singleton  MRN: [id number]  Patient Class: IP- Inpatient   Admission Date: 4/18/2017  Length of Stay: 6 days  Attending Physician: Rene Smith MD  Primary Care Provider: Susana Arvizu MD        Subjective:     Principal Problem:Hypercalcemia    HPI:  89 y/o female with Hx of 1 HPTH s/p one parathyroid removal, CKDIV, HTN, hypothyroidism, Dementia sent by home health after her calcium levels from her blood work done yesterday was too high. Per family patient has increased weakness, confusion, decreased appetite. She has a Hx of similar hypercalcemia and gets drowsy and fatigued each time. Her daughter denies that she has had any fever, CP, SOB, nausea and vomiting.  Currently not on hemodialysis.     Hospital Course:  Patient appeared to be volume depleated with elevated calcium and renal insufficiency upon admission and was started on IV fluids. Doses were adjusted to account for CHF. Repletion of lytes begun upon admission and is ongoing as per needs indicated by daily labs. She is being followed by Nephrology. On 4/19/2017 she began to develop heart block while being monitored on telemetry. Cardiology was consulted. A pacemaker would be reasonable with this degree of conduction disease; however, she has multiple medical issues including advanced dementia. Per Violet Benjamin, reasonable approach may be to place a pacemaker if she becomes ambulatory.Hypernatremia resolved. Hypercalcemia improving. Slowly resolving back to baseline.  Holding Calcitriol/D3.  Now off of nasal calcitonin, seems to be tolerating sensipar. Clinically improved, VSS      awaiting SNF      Interval History: uneventful night    Review of Systems   Unable to perform ROS: Dementia     Objective:     Vital Signs (Most Recent):  Temp: 98 °F (36.7 °C) (04/24/17 1214)  Pulse: 96 (04/24/17 1400)  Resp: 18 (04/24/17 1214)  BP: 136/60 (04/24/17 1214)  SpO2: 96 %  (04/24/17 1200) Vital Signs (24h Range):  Temp:  [97.8 °F (36.6 °C)-98.3 °F (36.8 °C)] 98 °F (36.7 °C)  Pulse:  [] 96  Resp:  [16-20] 18  SpO2:  [95 %-97 %] 96 %  BP: (136-176)/(60-87) 136/60     Weight: 56.7 kg (125 lb)  Body mass index is 21.46 kg/(m^2).    Intake/Output Summary (Last 24 hours) at 04/24/17 3836  Last data filed at 04/24/17 1200   Gross per 24 hour   Intake              720 ml   Output              800 ml   Net              -80 ml      Physical Exam   Constitutional: She appears well-developed and well-nourished. No distress.   HENT:   Head: Normocephalic and atraumatic.   Eyes: Conjunctivae and EOM are normal. Pupils are equal, round, and reactive to light. No scleral icterus.   Neck: Normal range of motion. Neck supple. No tracheal deviation present.   Cardiovascular: Normal rate, regular rhythm, normal heart sounds and intact distal pulses.  Exam reveals no gallop and no friction rub.    No murmur heard.  Pulmonary/Chest: Effort normal and breath sounds normal. No stridor. No respiratory distress. She has no wheezes. She has no rales.   Abdominal: Soft. Bowel sounds are normal. She exhibits no distension. There is no tenderness.   Musculoskeletal: Normal range of motion. She exhibits no deformity.   Left UE erythema   Neurological: She is alert.   Cranial nerves grossly intact.    Skin: Skin is warm and dry. No rash noted. She is not diaphoretic. No erythema. No pallor.   Psychiatric: She has a normal mood and affect. Her behavior is normal. Judgment and thought content normal.   Nursing note and vitals reviewed.      Significant Labs:   BMP:   Recent Labs  Lab 04/24/17  0337   GLU 80   *   K 4.5      CO2 21*   BUN 40*   CREATININE 2.9*   CALCIUM 11.1*     All pertinent labs within the past 24 hours have been reviewed.        Assessment/Plan:      * Hypercalcemia  - possibly 2.2 calcitonin dose increase recently  - IVF's per Nephrology, was given 1 L in ED  - hold calcitriol,  continue sensipar dose at 60 BID 2/2 nausea at high doses          Chronic kidney disease, stage IV (severe)  - likely d/t volume depletion  - improved from admission Cr at 2.9 plateau, slowly resolving  patient has had one round of HD previous admission, Renal fxn had stabilized and HD is on hold per family  - close follow up with Nephrology        LLOYD (acute kidney injury)  - slowly resolving  - likely 2/2 volume depletion  - no need for HD according to nephrology         Hypertension  - on amlodipine 10 mg           Hyperparathyroidism  - currently on sensipar at home  - calcitriol being hold      Dementia  - 2/2 Alzheimers   - worsened 2/2 hypercalcemia          Generalized weakness  - PT/ OT        Mobitz type 1 second degree atrioventricular block  - Seen by Cardiology (Dr. Lazo): consider pacer when mental statues improves and not bedridden   - per family, longstanding      Encephalopathy, metabolic  - d/t hypercalcemia on dementia  - improving      Left arm pain  - erythema/ pain to LUE  - doppler negative for DVT      VTE Risk Mitigation         Ordered     heparin (porcine) injection 5,000 Units  Every 12 hours     Route:  Subcutaneous        04/23/17 1614     Medium Risk of VTE  Once      04/18/17 1717     Place sequential compression device  Until discontinued      04/18/17 1717     Place BARBARA hose  Until discontinued      04/18/17 1717          Yee Paz PA-C  Department of Hospital Medicine   [facility]

## 2017-04-25 VITALS
SYSTOLIC BLOOD PRESSURE: 139 MMHG | BODY MASS INDEX: 21.34 KG/M2 | DIASTOLIC BLOOD PRESSURE: 91 MMHG | WEIGHT: 125 LBS | RESPIRATION RATE: 18 BRPM | HEART RATE: 90 BPM | HEIGHT: 64 IN | OXYGEN SATURATION: 97 % | TEMPERATURE: 98 F

## 2017-04-25 LAB
ANION GAP SERPL CALC-SCNC: 7 MMOL/L
BUN SERPL-MCNC: 43 MG/DL
CA-I BLDV-SCNC: 1.56 MMOL/L
CALCIUM SERPL-MCNC: 10.7 MG/DL
CHLORIDE SERPL-SCNC: 106 MMOL/L
CO2 SERPL-SCNC: 24 MMOL/L
CREAT SERPL-MCNC: 3.2 MG/DL
EST. GFR  (AFRICAN AMERICAN): 14 ML/MIN/1.73 M^2
EST. GFR  (NON AFRICAN AMERICAN): 12 ML/MIN/1.73 M^2
GLUCOSE SERPL-MCNC: 70 MG/DL
POTASSIUM SERPL-SCNC: 4.5 MMOL/L
SODIUM SERPL-SCNC: 137 MMOL/L

## 2017-04-25 PROCEDURE — 82330 ASSAY OF CALCIUM: CPT

## 2017-04-25 PROCEDURE — 97116 GAIT TRAINING THERAPY: CPT

## 2017-04-25 PROCEDURE — 97535 SELF CARE MNGMENT TRAINING: CPT

## 2017-04-25 PROCEDURE — 25000003 PHARM REV CODE 250: Performed by: INTERNAL MEDICINE

## 2017-04-25 PROCEDURE — 80048 BASIC METABOLIC PNL TOTAL CA: CPT

## 2017-04-25 PROCEDURE — 92526 ORAL FUNCTION THERAPY: CPT

## 2017-04-25 PROCEDURE — 36415 COLL VENOUS BLD VENIPUNCTURE: CPT

## 2017-04-25 PROCEDURE — 25000003 PHARM REV CODE 250: Performed by: PHYSICIAN ASSISTANT

## 2017-04-25 PROCEDURE — 99239 HOSP IP/OBS DSCHRG MGMT >30: CPT | Mod: ,,, | Performed by: PHYSICIAN ASSISTANT

## 2017-04-25 PROCEDURE — 97110 THERAPEUTIC EXERCISES: CPT

## 2017-04-25 RX ADMIN — AMLODIPINE BESYLATE 10 MG: 5 TABLET ORAL at 09:04

## 2017-04-25 RX ADMIN — LEVOTHYROXINE SODIUM 150 MCG: 25 TABLET ORAL at 05:04

## 2017-04-25 RX ADMIN — HEPARIN SODIUM 5000 UNITS: 5000 INJECTION, SOLUTION INTRAVENOUS; SUBCUTANEOUS at 10:04

## 2017-04-25 RX ADMIN — CINACALCET HYDROCHLORIDE 60 MG: 30 TABLET, COATED ORAL at 09:04

## 2017-04-25 RX ADMIN — POLYETHYLENE GLYCOL 3350 17 G: 17 POWDER, FOR SOLUTION ORAL at 09:04

## 2017-04-25 RX ADMIN — ASPIRIN 81 MG: 81 TABLET, COATED ORAL at 09:04

## 2017-04-25 NOTE — NURSING
No significant events overnight. Remains free from fall, injury, and skin breakdown. Incontinence care performed PRN. VSS on RA throughout the night. Denies pain. Tele maintained; all alarms active and audible. TEDs/SCDs in place. Plan of care reviewed with patient and all questions answered. Bed low, locked w/ bed alarm on. Call light within reach. Purposeful rounding performed. Resting comfortably in bed, family at the bedside, no other complaints at this time.

## 2017-04-25 NOTE — SUBJECTIVE & OBJECTIVE
Interval History: Patient known to me from last week. No new concerns, was awaiting placement. Has been accepted to [facility] and will be discharged and transported to the same today. No new concerns at this time. Daughter, at bedside, states she was more alert today, and ate meals with moderate assistance.     Review of Systems   Unable to perform ROS: Dementia     Objective:     Vital Signs (Most Recent):  Temp: 97.9 °F (36.6 °C) (04/25/17 1243)  Pulse: 91 (04/25/17 1243)  Resp: 16 (04/25/17 0705)  BP: (!) 194/78 (04/25/17 1243)  SpO2: 97 % (04/25/17 1243) Vital Signs (24h Range):  Temp:  [97.9 °F (36.6 °C)-98.5 °F (36.9 °C)] 97.9 °F (36.6 °C)  Pulse:  [62-98] 91  Resp:  [16-18] 16  SpO2:  [95 %-98 %] 97 %  BP: (138-194)/(65-80) 194/78     Weight: 56.7 kg (125 lb)  Body mass index is 21.46 kg/(m^2).    Intake/Output Summary (Last 24 hours) at 04/25/17 1358  Last data filed at 04/25/17 1000   Gross per 24 hour   Intake              480 ml   Output                0 ml   Net              480 ml      Physical Exam   Constitutional: She appears well-developed and well-nourished. No distress.   HENT:   Head: Normocephalic and atraumatic.   Eyes: Conjunctivae and EOM are normal. Pupils are equal, round, and reactive to light. No scleral icterus.   Neck: Normal range of motion. Neck supple. No tracheal deviation present.   Cardiovascular: Normal rate, regular rhythm, normal heart sounds and intact distal pulses.  Exam reveals no gallop and no friction rub.    No murmur heard.  Pulmonary/Chest: Effort normal and breath sounds normal. No respiratory distress. She has no wheezes. She has no rales.   Abdominal: Soft. Bowel sounds are normal. She exhibits no distension. There is no tenderness.   Musculoskeletal: Normal range of motion. She exhibits no deformity.   Neurological: She is alert.   Cranial nerves grossly intact.    Skin: Skin is warm and dry. No rash noted. She is not diaphoretic. No erythema. No pallor.    Psychiatric: She has a normal mood and affect. Her behavior is normal. Judgment and thought content normal.   Nursing note and vitals reviewed.      Significant Labs:   BMP:   Recent Labs  Lab 04/25/17  0534   GLU 70      K 4.5      CO2 24   BUN 43*   CREATININE 3.2*   CALCIUM 10.7*     CBC: No results for input(s): WBC, HGB, HCT, PLT in the last 48 hours.  CMP:   Recent Labs  Lab 04/24/17  0337 04/25/17  0534   * 137   K 4.5 4.5    106   CO2 21* 24   GLU 80 70   BUN 40* 43*   CREATININE 2.9* 3.2*   CALCIUM 11.1* 10.7*   ANIONGAP 8 7*   EGFRNONAA 14* 12*     All pertinent labs within the past 24 hours have been reviewed.    Significant Imaging: I have reviewed all pertinent imaging results/findings within the past 24 hours.     Imaging Results         X-Ray Chest AP Portable (Final result) Result time:  04/24/17 13:01:42    Final result by Beena Pierre MD (04/24/17 13:01:42)    Impression:      Mild blunting of the LEFT lateral costophrenic angle may be artifactual as discussed above.      Electronically signed by: Beena Pierre MD  Date:     04/24/17  Time:    13:01     Narrative:    Time of Procedure: 04/24/17 11:31:00  Accession # [id number]    Comparison: 1/30/2017.    Number of views: 1.    Findings:  The patient has known mild pulmonary underinflation and considerable tortuosity of the atherosclerotic aorta.    Today's study was obtained with the patient in RIGHT posterior oblique rotation.  I suspect this rotation accounts for the appearance of blunting of the LEFT lateral costophrenic angle and consider pleural fluid less likely although not excluded.    I detect no acute pulmonary disease and no cardiac decompensation, pneumothorax, pneumomediastinum or pneumo-peritoneum.  The remote LEFT humeral fracture with nonunion appears similar to that observed on the chest radiograph of 1/30/2017.            US Upper Extremity Veins Left (Final result) Result time:  04/23/17 14:51:35     Final result by Marcin Sandoval MD (04/23/17 14:51:35)    Impression:     No evidence of left upper extremity deep venous thrombosis..      Electronically signed by: MARCIN SANDOVAL MD  Date:     04/23/17  Time:    14:51     Narrative:    Comparison: None.    Findings: Duplex scan of the left upper extremity was performed using B. mode/grayscale imaging and Doppler spectral analysis and color-flow.  The left internal jugular, subclavian, axillary, brachial, basilic, and cephalic demonstrate normal Doppler color flow and compressibility.  No fluid collections identified.

## 2017-04-25 NOTE — PT/OT/SLP PROGRESS
"Speech Language Pathology  Treatment    Mary Singleton   MRN: [id number]   Admitting Diagnosis: Hypercalcemia    Diet recommendations: Solid Diet Level: Mechanical soft  Liquid Diet Level: Thin Feed only when awake/alert, HOB to 90 degrees, Small bites/sips, Alternating bites/sips, 1 bite/sip at a time, Check for pocketing/oral residue and Assistance with meals    SLP Treatment Date: 04/25/17  Speech Start Time: 0820     Speech Stop Time: 0845     Speech Total (min): 25 min       TREATMENT BILLABLE MINUTES:  Treatment Swallowing Dysfunction 25            General Precautions: Standard, fall, aspiration          Subjective:  Pt agreeable to trials of soft solids.    Objective:   SWALLOWING: Increased tolerance of mechanical soft solids with thin liquids with no signs of airway threat or aspiration. Mildly slow mastication of solids. Occasional instances of oral holding required verbal cues to help initiate oral swallow. Able to consume liquids from a straw. Minimal oral intake with pt stating "Thats enough" after 5 bites of solids and apprx 2 oz of coffee with mild     Assessment:  Mary Singleton is a 89 y.o. female with a medical diagnosis of Hypercalcemia and presents with dcr alertness level affecting safe oral intake. Increased ability to tolerate soft solids with max assistance  Discharge recommendations:   mechanical soft diet with thin liquids, 1:1 assistance , speech follow up at next level of care  Goals:   SLP Goals        Problem: SLP Goal    Goal Priority Disciplines Outcome   SLP Goal     SLP Ongoing (interventions implemented as appropriate)              Plan:   Patient to be seen Therapy Frequency: 5 x/week   Plan of Care expires: 05/23/17  Plan of Care reviewed with: son  SLP Follow-up?: Yes              Mer Goodman CCC-SLP  04/25/2017    "

## 2017-04-25 NOTE — PT/OT/SLP PROGRESS
"Physical Therapy  Treatment    Mary Singleton   MRN: [id number]   Admitting Diagnosis: Hypercalcemia    PT Received On: 04/25/17  PT Start Time: 1335     PT Stop Time: 1401    PT Total Time (min): 26 min       Billable Minutes:  Gait [id number] There ex 11 min. (co-tx w/ OT 3 min)    Treatment Type: Treatment  PT/PTA: PTA     PTA Visit Number: 3       General Precautions: Standard, anti-coagulation medicine, fall, aspiration  Orthopedic Precautions: N/A   Braces:      Do you have any cultural, spiritual, Christian conflicts, given your current situation?: Christian per chart    Subjective:  Communicated with ns prior to session.  Pt agreeable to tx., found sitting up EOB w/ OT    Pain Rating:  (pt w/ less reports of pain, only sometimes when touched she would say "ow")                   Objective:   Patient found with: telemetry, peripheral IV    Functional Mobility:  Bed Mobility:   Scooting/Bridging: Minimum Assistance (pt using LE's to assist up in bed)  Sit to Supine: Minimum Assistance, With leg lift    Transfers:  Sit <> Stand Assistance: Moderate Assistance (x 2 trials, once from EOB and once from commode chair)  Sit <> Stand Assistive Device: Rolling Walker    Gait:   Gait Distance: 10' w/ RW and min.A  Assistance 1: Minimum assistance  Gait Assistive Device: Rolling walker  Gait Pattern: reciprocal  Gait Deviation(s): decreased bryan, decreased step length, decreased stride length, decreased toe-to-floor clearance, forward lean    Stairs:  n/a    Balance:   Static Sit: FAIR-: Maintains without assist but inconsistent   Dynamic Sit: FAIR: Cannot move trunk without losing balance  Static Stand: POOR: Needs MODERATE assist to maintain  Dynamic stand: POOR: N/A     Therapeutic Activities and Exercises:  SPT to commode chair w/ modA, after urinating pt stood w/ RW and modA, OT present to perform hygeine, pt then amb'd w/RW and Shola ~10' in room, Shola for RW guidance. Pt. ret'd to EOB, rested briefly, then " perf'd LAQ's x 10 ea., ret'd to supine w/ Shola at LE's, worked on scooting up in bed using LE's w/ min.A, pt perf'd supine ex's of AP's, hip abd/add, and SLR's x 10 ea.         AM-PAC 6 CLICK MOBILITY  How much help from another person does this patient currently need?   1 = Unable, Total/Dependent Assistance  2 = A lot, Maximum/Moderate Assistance  3 = A little, Minimum/Contact Guard/Supervision  4 = None, Modified [place]/Independent    Turning over in bed (including adjusting bedclothes, sheets and blankets)?: 3  Sitting down on and standing up from a chair with arms (e.g., wheelchair, bedside commode, etc.): 2  Moving from lying on back to sitting on the side of the bed?: 2  Moving to and from a bed to a chair (including a wheelchair)?: 3  Need to walk in hospital room?: 3  Climbing 3-5 steps with a railing?: 2  Total Score: 15    AM-PAC Raw Score CMS G-Code Modifier Level of Impairment Assistance   6 % Total / Unable   7 - 9 CM 80 - 100% Maximal Assist   10 - 14 CL 60 - 80% Moderate Assist   15 - 19 CK 40 - 60% Moderate Assist   20 - 22 CJ 20 - 40% Minimal Assist   23 CI 1-20% SBA / CGA   24 CH 0% Independent/ Mod I     Patient left supine with all lines intact, call button in reach, bed alarm on and daughter present.    Assessment:  Mary Singleton is a 89 y.o. female with a medical diagnosis of Hypercalcemia and presents with inc mobility today,  req'd occ min.A for static sitting balance 2/2 R sided lean, and req'd modA for static standing 2/2 posterior lean. Pt. Able to amb today w/ RW and min.A 10'. Pt more alert today and following commands better.    Rehab identified problem list/impairments: Rehab identified problem list/impairments: weakness, impaired endurance, impaired self care skills, impaired functional mobilty, gait instability, impaired balance, decreased coordination, decreased safety awareness, impaired cognition    Rehab potential is good.    Activity tolerance:  Good    Discharge recommendations: Discharge Facility/Level Of Care Needs: nursing facility, skilled     Barriers to discharge: Barriers to Discharge: Decreased caregiver support    Equipment recommendations: Equipment Needed After Discharge:  (TBD in SNF)     GOALS:   Physical Therapy Goals        Problem: Physical Therapy Goal    Goal Priority Disciplines Outcome Goal Variances Interventions   Physical Therapy Goal     PT/OT, PT Ongoing (interventions implemented as appropriate)     Description:  Goals to be met by: 5/15/17    Patient will increase functional independence with mobility by performin. Supine > sit with supervision.   2. Sit > supine with supervision.   3. Sit <> stand transfer with supervision with rolling walker.   4. Gait > 50 feet with CGA with rolling walker.   5. Ascend/descend curb step with CGA with rolling walker.                 PLAN:    Patient to be seen 6 x/week  to address the above listed problems via gait training, therapeutic activities, therapeutic exercises, neuromuscular re-education  Plan of Care expires: 17  Plan of Care reviewed with: patient, daughter         Kim Brown, PTA  2017

## 2017-04-25 NOTE — PROGRESS NOTES
[facility]  Cardiology  Progress Note    Patient Name: Mary Singleton  MRN: [id number]  Admission Date: 4/18/2017  Hospital Length of Stay: 7 days  Code Status: Full Code   Attending Physician: Mariana Braga MD   Primary Care Physician: Susana Arvizu MD  Expected Discharge Date:   Principal Problem:Hypercalcemia    Subjective:     Brief HPI:    Mary Singleton is a 89 y.o. female with hypertension. She has severe carotid disease having undergone left CEA in 2002. She has late onset quite advanced Alzheimer's dementia. She has hypothyroidism, hyperparathyroidism and stage four chronic kidney disease. She has a right arm fistula that was briefly used a few months ago.     She presents with further changes in mental status and is found to be hypercalcemic. She is on telemetry and today had episodes of Second degree AV block, Mobitz 1 with 2:1 AV conduction with a heart rate in 40s. She is currently bedridden and not able to give any history.     Hospital Course:     Supportive care.    On telemetry.    Interval History:     Mental status has improved but still very confused. No distress. No further bradycardia. Bedridden.    Review of Systems   Cardiovascular: Negative for chest pain and syncope.   Respiratory: Negative for cough and shortness of breath.      Objective:     Vital Signs (Most Recent):  Temp: 98.5 °F (36.9 °C) (04/25/17 0705)  Pulse: 78 (04/25/17 0800)  Resp: 16 (04/25/17 0705)  BP: 138/80 (04/25/17 0705)  SpO2: 96 % (04/25/17 0705) Vital Signs (24h Range):  Temp:  [97.9 °F (36.6 °C)-98.5 °F (36.9 °C)] 98.5 °F (36.9 °C)  Pulse:  [] 78  Resp:  [16-18] 16  SpO2:  [95 %-98 %] 96 %  BP: (136-168)/(60-80) 138/80     Weight: 56.7 kg (125 lb)  Body mass index is 21.46 kg/(m^2).    SpO2: 96 %  O2 Device (Oxygen Therapy): room air      Intake/Output Summary (Last 24 hours) at 04/25/17 1010  Last data filed at 04/24/17 1200   Gross per 24 hour   Intake              240 ml   Output                 0 ml   Net              240 ml       Lines/Drains/Airways     Drain                 Hemodialysis AV Fistula Right forearm -- days                Physical Exam   Constitutional: She appears well-developed. She appears ill. No distress.   HENT:   Head: Normocephalic and atraumatic.   Nose: Nose normal.   Neck: No JVD present.   Cardiovascular: Normal rate, regular rhythm, S1 normal and S2 normal.  Exam reveals gallop and S4.    Pulmonary/Chest: Effort normal and breath sounds normal.   Abdominal: Normal appearance. There is no tenderness.   Musculoskeletal:        Right ankle: She exhibits no swelling.        Left ankle: She exhibits no swelling.   Neurological: She is disoriented.   Skin: Skin is warm and dry. No rash noted.     Current Medications:     amlodipine  10 mg Oral Daily    aspirin  81 mg Oral Daily    bisacodyl  10 mg Rectal Once    cinacalcet  60 mg Oral BID WM    heparin (porcine)  5,000 Units Subcutaneous Q12H    levothyroxine  150 mcg Oral Before breakfast    polyethylene glycol  17 g Oral Daily     Current Laboratory Results:    Recent Results (from the past 24 hour(s))   Basic metabolic panel    Collection Time: 04/25/17  5:34 AM   Result Value Ref Range    Sodium 137 136 - 145 mmol/L    Potassium 4.5 3.5 - 5.1 mmol/L    Chloride 106 95 - 110 mmol/L    CO2 24 23 - 29 mmol/L    Glucose 70 70 - 110 mg/dL    BUN, Bld 43 (H) 8 - 23 mg/dL    Creatinine 3.2 (H) 0.5 - 1.4 mg/dL    Calcium 10.7 (H) 8.7 - 10.5 mg/dL    Anion Gap 7 (L) 8 - 16 mmol/L    eGFR if African American 14 (A) >60 mL/min/1.73 m^2    eGFR if non African American 12 (A) >60 mL/min/1.73 m^2   Calcium, ionized    Collection Time: 04/25/17  5:34 AM   Result Value Ref Range    Calcium, Ion 1.56 (H) 1.06 - 1.42 mmol/L     Current Imaging Results:    Imaging Results         X-Ray Chest AP Portable (Final result) Result time:  04/24/17 13:01:42    Final result by Beena Pierre MD (04/24/17 13:01:42)    Impression:       Mild blunting of the LEFT lateral costophrenic angle may be artifactual as discussed above.      Electronically signed by: Beena Pierre MD  Date:     04/24/17  Time:    13:01     Narrative:    Time of Procedure: 04/24/17 11:31:00  Accession # [id number]    Comparison: 1/30/2017.    Number of views: 1.    Findings:  The patient has known mild pulmonary underinflation and considerable tortuosity of the atherosclerotic aorta.    Today's study was obtained with the patient in RIGHT posterior oblique rotation.  I suspect this rotation accounts for the appearance of blunting of the LEFT lateral costophrenic angle and consider pleural fluid less likely although not excluded.    I detect no acute pulmonary disease and no cardiac decompensation, pneumothorax, pneumomediastinum or pneumo-peritoneum.  The remote LEFT humeral fracture with nonunion appears similar to that observed on the chest radiograph of 1/30/2017.            US Upper Extremity Veins Left (Final result) Result time:  04/23/17 14:51:35    Final result by Marcin Sandoval MD (04/23/17 14:51:35)    Impression:     No evidence of left upper extremity deep venous thrombosis..      Electronically signed by: MARCIN SANDOVAL MD  Date:     04/23/17  Time:    14:51     Narrative:    Comparison: None.    Findings: Duplex scan of the left upper extremity was performed using B. mode/grayscale imaging and Doppler spectral analysis and color-flow.  The left internal jugular, subclavian, axillary, brachial, basilic, and cephalic demonstrate normal Doppler color flow and compressibility.  No fluid collections identified.              Assessment and Plan:     Problem List:    Active Diagnoses:    Diagnosis Date Noted POA    PRINCIPAL PROBLEM:  Hypercalcemia [E83.52] 03/06/2017 Yes    Chronic kidney disease, stage IV (severe) [N18.4] 07/25/2013 Yes    Dementia [F03.90] 11/02/2016 Yes    Hypertension [I10] 07/25/2013 Yes    Hyperparathyroidism [E21.3] 07/25/2013 Yes     Encephalopathy, metabolic [G93.41] 04/23/2017 Yes    Left arm pain [M79.602] 04/23/2017 No    Mobitz type 1 second degree atrioventricular block [I44.1] 04/20/2017 Yes    Constipation [K59.00] 04/19/2017 Yes    LLOYD (acute kidney injury) [N17.9] 04/18/2017 Yes    Generalized weakness [R53.1] 03/06/2017 Yes      Problems Resolved During this Admission:    Diagnosis Date Noted Date Resolved POA    Hypernatremia [E87.0] 04/18/2017 04/21/2017 Yes     Assessment and Plan:    1. Atrioventricular Block, Second Degree, Mobitz 1               4/19/2017: With 2:1 AV block.              Stopped donepezil but doubt cause.              A pacemaker would be reasonable with this degree of conduction disease.              However she has multiple medical issues including advanced dementia.              A reasonable approach may be to place a pacemaker if she becomes ambulatory.   Discussed with daughter. She does not want her mother to have any surgery.     2. Carotid Artery Stenosis              2002: Left CEA.              10/26/2016: Carotid Duplex: GABBIE: Moderate plaquing - 1.9 m/s - 60-70%. LCCA: Proximal 1.7 m/s - 50-70%. LICA: Moderate plaquing - <50%.              See no need for further follow up at this age with dementia.              On aspirin 81 mg Q24.              Stable.     3. Hypertension              2000: Diagnosed.              On amlodipine 2.5 mg Q24 and lisinopril 20 mg Q24.     4. Chronic Kidney Disease, Stage 4              11/15/2012: BUN/crea 38/2.1. CrCl 22.              Right arm fistula.              Dr. Susana Arvizu.     5. Dementia              2012: Diagnosed with Alzheimer's Dementia.              Now advanced.              Dr. Dereck Patel.     6. Hyperparathyroidism              2010: Diagnosed.     7. Hypothyroidism              1990: Diagnosed.              On levothyroxine 150 mcg Q24.     8. Primary Care              Dr. Susana Arvizu.        VTE Risk Mitigation         Ordered      heparin (porcine) injection 5,000 Units  Every 12 hours     Route:  Subcutaneous        04/23/17 1614     Medium Risk of VTE  Once      04/18/17 1717     Place sequential compression device  Until discontinued      04/18/17 1717     Place BARBARA hose  Until discontinued      04/18/17 1717          Adrien Lazo MD  Cardiology  [facility]

## 2017-04-25 NOTE — ASSESSMENT & PLAN NOTE
- likely d/t volume depletion  - improved from admission Cr at 3.2 plateau, slowly resolving  patient has had one round of HD previous admission, Renal fxn had stabilized and HD is on hold per family  - close follow up with Nephrology

## 2017-04-25 NOTE — PLAN OF CARE
"Pt discharging to [facility], [phone]. [facility] to transport pt. Pt packet given to Pt's RN to call report.   CM then received call from [facility] stating they will be an hour later than scheduled because they were "backed up." Pt's family and staff notified of delay.  No further CM needs for discharge.     04/25/17 1701   Final Note   Assessment Type Final Discharge Note   Discharge Disposition SNF   Discharge planning education complete? Yes   What phone number can be called within the next 1-3 days to see how you are doing after discharge? [phone]   Hospital Follow Up  Appt(s) scheduled? No  (pt discharging to SNF)   Discharge plans and expectations educations in teach back method with documentation complete? Yes   Offered [facility] -- Bedside Delivery? n/a   Discharge/Hospital Encounter Summary to ([facility]) PCP Yes   Referral to Outpatient Case Management complete? n/a   Referral to / orders for Home Health Complete? n/a  (pt going to SNF)   30 day supply of medicines given at discharge, if documented non-compliance / non-adherence? n/a   Any social issues identified prior to discharge? No   Did you assess the readiness or willingness of the family or caregiver to support self management of care? Yes   Right Care Referral Info   Post Acute Recommendation SNF     "

## 2017-04-25 NOTE — PT/OT/SLP PROGRESS
"Occupational Therapy  Treatment    Mary Singleton   MRN: [id number]   Admitting Diagnosis: Hypercalcemia    OT Date of Treatment: 04/25/17   OT Start Time: 1315  OT Stop Time: 1350  OT Total Time (min): 35 min    Billable Minutes:  Self Care/Home Management 23 (co tx with PT for mobility only)    General Precautions: Standard, anti-coagulation medicine, fall, aspiration (diet, per ST, mech soft and to be up at 90 degrees during all meals)  Orthopedic Precautions: N/A  Braces: N/A    Do you have any cultural, spiritual, Sikhism conflicts, given your current situation?: Anabaptist    Subjective:  Communicated with nsg, PTA prior to session.  "That arm's not so good"  Pt referring to (L) UE    Pain Rating:  (unable to rate, but occasional complaint)  Location - Side: Left  Location - Orientation: upper  Location: shoulder (humerus)  Pain Addressed: Reposition  Pain Rating Post-Intervention: 0/10    Objective:  Patient found with: telemetry, peripheral IV     Functional Mobility:  Bed Mobility:  Supine to Sit: Moderate Assistance (at trunk from (R))    Transfers:   Sit <> Stand Assistance: Moderate Assistance (from EOB with hand held assist of 1 and CGA of second)  Sit <> Stand Assistive Device:  (Hand held assist)  Toilet Transfer Technique: Stand Pivot  Toilet Transfer Assistance: Moderate Assistance (of 1 and CGA of second)  Toilet Transfer Assistive Device: bedside commode    Functional Ambulation: Min assist of 2 people within room, with RW    Activities of Daily Living:  UE Dressing Level of Assistance: Moderate assistance (gown from EOB)  Grooming Position: EOB, Seated  Grooming Level of Assistance: Moderate assistance (combing hair (SBA for washing face) seated at EOB)  Toileting Where Assessed: Bedside commode  Toileting Level of Assistance: Total assistance    Balance:   Static Sit: FAIR-: Maintains without assist but inconsistent   Dynamic Sit: FAIR: Cannot move trunk without losing balance  Static Stand: " POOR: Needs MODERATE assist to maintain  Dynamic stand: POOR: N/A    Therapeutic Activities and Exercises:  (B) UE AAROM, 1 set x 8-10 reps at bed level with HOB elevated, in all available planes and within pain-free range.  Sitting EOB with CGA to SBA, with tactile and verbal cues with (R) lateral lean.    AM-PAC 6 CLICK ADL   How much help from another person does this patient currently need?   1 = Unable, Total/Dependent Assistance  2 = A lot, Maximum/Moderate Assistance  3 = A little, Minimum/Contact Guard/Supervision  4 = None, Modified [place]/Independent    Putting on and taking off regular lower body clothing? : 1  Bathing (including washing, rinsing, drying)?: 2  Toileting, which includes using toilet, bedpan, or urinal? : 1  Putting on and taking off regular upper body clothing?: 2  Taking care of personal grooming such as brushing teeth?: 2  Eating meals?: 2  Total Score: 10     AM-PAC Raw Score CMS G-Code Modifier Level of Impairment Assistance   6 % Total / Unable   7 - 9 CM 80 - 100% Maximal Assist   10 - 14 CL 60 - 80% Moderate Assist   15 - 19 CK 40 - 60% Moderate Assist   20 - 22 CJ 20 - 40% Minimal Assist   23 CI 1-20% SBA / CGA   24 CH 0% Independent/ Mod I     Patient left seated at EOB with PTA with all lines intact, call button in reach, nsg notified and PTA and dony present    ASSESSMENT:  Mary Singleton is a 89 y.o. female with a medical diagnosis of Hypercalcemia and presents with impaired balance, seated and in standing, impaired endurance and strength for ADL tasks and functional transfers/mobility with AD. Will benefit from OT services to maximize patient functioning to premorbid level.    Rehab identified problem list/impairments: Rehab identified problem list/impairments: weakness, impaired endurance, impaired self care skills, impaired functional mobilty, gait instability, impaired balance, impaired cognition, decreased upper extremity function, decreased lower  extremity function, decreased safety awareness, pain, impaired skin, decreased ROM    Rehab potential is good.    Activity tolerance: Good    Discharge recommendations: Discharge Facility/Level Of Care Needs: nursing facility, skilled     Barriers to discharge: Barriers to Discharge: Decreased caregiver support    Equipment recommendations:  (TBD, pending progress at SNF)     GOALS:   Occupational Therapy Goals        Problem: Occupational Therapy Goal    Goal Priority Disciplines Outcome Interventions   Occupational Therapy Goal     OT, PT/OT Ongoing (interventions implemented as appropriate)    Description:  Goals to be met by: 4/30/2017    Patient will increase functional independence with ADLs by performing:    Feeding with Stand-by Assistance.  Grooming while seated with Stand-by Assistance.  Toileting from bedside commode with Moderate Assistance for hygiene and clothing management.   Rolling to Bilateral with Contact Guard Assistance.   Supine to sit with Minimal Assistance.  Toilet transfer to bedside commode with Moderate Assistance.(MET 04/25/17)  Increased functional strength to 4-/5 for RUE; LUE elbow and hand 4-/5.  Upper extremity exercise program 10 reps per handout, with assistance as needed.                 Plan:  Patient to be seen 6 x/week to address the above listed problems via self-care/home management, therapeutic activities, therapeutic exercises, cognitive retraining  Plan of Care expires: 05/20/17  Plan of Care reviewed with: patient, daughter         Jess MAGUE Hopkins  04/25/2017

## 2017-04-25 NOTE — PT/OT/SLP PROGRESS
"Physical Therapy  Treatment    Mary Singleton   MRN: [id number]   Admitting Diagnosis: Hypercalcemia    PT Received On: 04/24/17  PT Start Time: 1730     PT Stop Time: 1814    PT Total Time (min): 44 min       Billable Minutes:  Therapeutic Activity 34 and Therapeutic Exercise 10    Treatment Type: Treatment  PT/PTA: PTA     PTA Visit Number: 2       General Precautions: Standard, aspiration, fall, vision impaired, hearing impaired  Orthopedic Precautions: N/A   Braces: N/A    Do you have any cultural, spiritual, Adventist conflicts, given your current situation?: Mormonism per chart    Subjective:  Communicated with ns prior to session.  Pt. Agreeable to tx., daughter present for start of tx., son arrived during tx.    Pain Rating:  (pt states "ow" to any type of touch, daughter reports she has done that a long time)                   Objective:   Patient found with: telemetry, peripheral IV, SCD    Functional Mobility:  Bed Mobility:   Supine to Sit: Maximum Assistance, With side rail, With leg lift  Sit to Supine: Moderate Assistance, With leg lift    Transfers:  Sit <> Stand Assistance: Maximum Assistance, Total Assistance (x 2 trials from EOB )  Sit <> Stand Assistive Device: Rolling Walker    Gait:   Pt stood ~:30-:45 sec x 2 trials w/ RW and max./totA x 1. Pt w/ posterior lean in standing.    Stairs:  n/a    Balance:   Static Sit: POOR+: Needs MINIMAL assist to maintain  Dynamic Sit: POOR: N/A  Static Stand: POOR, req's max/totA   Dynamic stand: n/a    Therapeutic Activities and Exercises:  Pt sat up EOB ~30 min. W/ CGA/min.A for static sitting balance, worked on ADL's (eating,drinking) w/ max.A at times, pt able to occasionally hold spoon , but req'd assist to lift to mouth. Pt. perf'd seated LE LAQ's x 10 ea. W/ assistance. Supine ex's of AP's, heelslides, hp abd/add x 10 ea. Worked on scooting up using LE's, but pt req'd max.A x 2 to scoot up fully.      AM-PAC 6 CLICK MOBILITY  How much help from another " person does this patient currently need?   1 = Unable, Total/Dependent Assistance  2 = A lot, Maximum/Moderate Assistance  3 = A little, Minimum/Contact Guard/Supervision  4 = None, Modified [place]/Independent    Turning over in bed (including adjusting bedclothes, sheets and blankets)?: 2  Sitting down on and standing up from a chair with arms (e.g., wheelchair, bedside commode, etc.): 2  Moving from lying on back to sitting on the side of the bed?: 2  Moving to and from a bed to a chair (including a wheelchair)?: 2  Need to walk in hospital room?: 1  Climbing 3-5 steps with a railing?: 1  Total Score: 10    AM-PAC Raw Score CMS G-Code Modifier Level of Impairment Assistance   6 % Total / Unable   7 - 9 CM 80 - 100% Maximal Assist   10 - 14 CL 60 - 80% Moderate Assist   15 - 19 CK 40 - 60% Moderate Assist   20 - 22 CJ 20 - 40% Minimal Assist   23 CI 1-20% SBA / CGA   24 CH 0% Independent/ Mod I     Patient left HOB elevated with all lines intact, call button in reach, bed alarm on, ns notified and family present.    Assessment:  Mary Singleton is a 89 y.o. female with a medical diagnosis of Hypercalcemia and presents with dec mobility, dec strength/endurance. Pt. Able to follow most commands, though needs lots of cueing to stay focused on task, tends to stare off into space at times during tx. session.    Rehab identified problem list/impairments: Rehab identified problem list/impairments: weakness, impaired endurance, impaired balance, impaired functional mobilty, impaired self care skills, decreased safety awareness    Rehab potential is good.    Activity tolerance: Good    Discharge recommendations: Discharge Facility/Level Of Care Needs: nursing facility, skilled     Barriers to discharge: Barriers to Discharge: Decreased caregiver support    Equipment recommendations: Equipment Needed After Discharge:  (TBD in SNF)     GOALS:   Physical Therapy Goals        Problem: Physical Therapy Goal     Goal Priority Disciplines Outcome Goal Variances Interventions   Physical Therapy Goal     PT/OT, PT Ongoing (interventions implemented as appropriate)     Description:  Goals to be met by: 5/15/17    Patient will increase functional independence with mobility by performin. Supine > sit with supervision.   2. Sit > supine with supervision.   3. Sit <> stand transfer with supervision with rolling walker.   4. Gait > 50 feet with CGA with rolling walker.   5. Ascend/descend curb step with CGA with rolling walker.                 PLAN:    Patient to be seen 6 x/week  to address the above listed problems via gait training, therapeutic activities, therapeutic exercises, neuromuscular re-education  Plan of Care expires: 17  Plan of Care reviewed with: patient, daughter         iKm Brown, PTA  2017

## 2017-04-25 NOTE — PLAN OF CARE
Problem: Physical Therapy Goal  Goal: Physical Therapy Goal  Goals to be met by: 5/15/17    Patient will increase functional independence with mobility by performin. Supine > sit with supervision.   2. Sit > supine with supervision.   3. Sit <> stand transfer with supervision with rolling walker.   4. Gait > 50 feet with CGA with rolling walker.   5. Ascend/descend curb step with CGA with rolling walker.    Pt w/ slow progression towards goals, able to stand up w/ RW and max/totA x 1 ~:30-:45 sec. X 2 trials today, pt w/ posterior lean in standing and req'd max/totA to stand. Sat up EOB ~30 min. W/ CGA/min.A for balance.  Recommend cont PT in SNF.

## 2017-04-25 NOTE — PROGRESS NOTES
[facility] Hospital Medicine  Progress Note    Patient Name: Mary Singleton  MRN: [id number]  Patient Class: IP- Inpatient   Admission Date: 4/18/2017  Length of Stay: 7 days  Attending Physician: Mariana Braga MD  Primary Care Provider: Susana Arvizu MD        Subjective:     Principal Problem:Hypercalcemia    HPI:  88 y/o female with Hx of 1 HPTH s/p one parathyroid removal, CKDIV, HTN, hypothyroidism, Dementia sent by home health after her calcium levels from her blood work done yesterday was too high. Per family patient has increased weakness, confusion, decreased appetite. She has a Hx of similar hypercalcemia and gets drowsy and fatigued each time. Her daughter denies that she has had any fever, CP, SOB, nausea and vomiting.  Currently not on hemodialysis.     Hospital Course:  Patient appeared to be volume depleated with elevated calcium and renal insufficiency upon admission and was started on IV fluids. Doses were adjusted to account for CHF. Repletion of lytes begun upon admission and is ongoing as per needs indicated by daily labs. She is being followed by Nephrology. On 4/19/2017 she began to develop heart block while being monitored on telemetry. Cardiology was consulted. A pacemaker would be reasonable with this degree of conduction disease; however, she has multiple medical issues including advanced dementia. Per Violet Benjamin, reasonable approach may be to place a pacemaker if she becomes ambulatory.Hypernatremia resolved. Hypercalcemia improving. Slowly resolving back to baseline.  Holding Calcitriol/D3.  Now off of nasal calcitonin, seems to be tolerating sensipar. Clinically improved, VSS     Patient will be discharged today (04/25/2017) to [facility].       Interval History: Patient known to me from last week. No new concerns, was awaiting placement. Has been accepted to [facility] and will be discharged and transported to the same today. No new concerns at this time.  Daughter, at bedside, states she was more alert today, and ate meals with moderate assistance.     Review of Systems   Unable to perform ROS: Dementia     Objective:     Vital Signs (Most Recent):  Temp: 97.9 °F (36.6 °C) (04/25/17 1243)  Pulse: 91 (04/25/17 1243)  Resp: 16 (04/25/17 0705)  BP: (!) 194/78 (04/25/17 1243)  SpO2: 97 % (04/25/17 1243) Vital Signs (24h Range):  Temp:  [97.9 °F (36.6 °C)-98.5 °F (36.9 °C)] 97.9 °F (36.6 °C)  Pulse:  [62-98] 91  Resp:  [16-18] 16  SpO2:  [95 %-98 %] 97 %  BP: (138-194)/(65-80) 194/78     Weight: 56.7 kg (125 lb)  Body mass index is 21.46 kg/(m^2).    Intake/Output Summary (Last 24 hours) at 04/25/17 1358  Last data filed at 04/25/17 1000   Gross per 24 hour   Intake              480 ml   Output                0 ml   Net              480 ml      Physical Exam   Constitutional: She appears well-developed and well-nourished. No distress.   HENT:   Head: Normocephalic and atraumatic.   Eyes: Conjunctivae and EOM are normal. Pupils are equal, round, and reactive to light. No scleral icterus.   Neck: Normal range of motion. Neck supple. No tracheal deviation present.   Cardiovascular: Normal rate, regular rhythm, normal heart sounds and intact distal pulses.  Exam reveals no gallop and no friction rub.    No murmur heard.  Pulmonary/Chest: Effort normal and breath sounds normal. No respiratory distress. She has no wheezes. She has no rales.   Abdominal: Soft. Bowel sounds are normal. She exhibits no distension. There is no tenderness.   Musculoskeletal: Normal range of motion. She exhibits no deformity.   Neurological: She is alert.   Cranial nerves grossly intact.    Skin: Skin is warm and dry. No rash noted. She is not diaphoretic. No erythema. No pallor.   Psychiatric: She has a normal mood and affect. Her behavior is normal. Judgment and thought content normal.   Nursing note and vitals reviewed.      Significant Labs:   BMP:   Recent Labs  Lab 04/25/17  0534   GLU 70   NA  137   K 4.5      CO2 24   BUN 43*   CREATININE 3.2*   CALCIUM 10.7*     CBC: No results for input(s): WBC, HGB, HCT, PLT in the last 48 hours.  CMP:   Recent Labs  Lab 04/24/17  0337 04/25/17  0534   * 137   K 4.5 4.5    106   CO2 21* 24   GLU 80 70   BUN 40* 43*   CREATININE 2.9* 3.2*   CALCIUM 11.1* 10.7*   ANIONGAP 8 7*   EGFRNONAA 14* 12*     All pertinent labs within the past 24 hours have been reviewed.    Significant Imaging: I have reviewed all pertinent imaging results/findings within the past 24 hours.     Imaging Results         X-Ray Chest AP Portable (Final result) Result time:  04/24/17 13:01:42    Final result by Beena Pierre MD (04/24/17 13:01:42)    Impression:      Mild blunting of the LEFT lateral costophrenic angle may be artifactual as discussed above.      Electronically signed by: Beena Pierre MD  Date:     04/24/17  Time:    13:01     Narrative:    Time of Procedure: 04/24/17 11:31:00  Accession # [id number]    Comparison: 1/30/2017.    Number of views: 1.    Findings:  The patient has known mild pulmonary underinflation and considerable tortuosity of the atherosclerotic aorta.    Today's study was obtained with the patient in RIGHT posterior oblique rotation.  I suspect this rotation accounts for the appearance of blunting of the LEFT lateral costophrenic angle and consider pleural fluid less likely although not excluded.    I detect no acute pulmonary disease and no cardiac decompensation, pneumothorax, pneumomediastinum or pneumo-peritoneum.  The remote LEFT humeral fracture with nonunion appears similar to that observed on the chest radiograph of 1/30/2017.            US Upper Extremity Veins Left (Final result) Result time:  04/23/17 14:51:35    Final result by Marcin Sandoval MD (04/23/17 14:51:35)    Impression:     No evidence of left upper extremity deep venous thrombosis..      Electronically signed by: MARCIN SANDOVAL MD  Date:      04/23/17  Time:    14:51     Narrative:    Comparison: None.    Findings: Duplex scan of the left upper extremity was performed using B. mode/grayscale imaging and Doppler spectral analysis and color-flow.  The left internal jugular, subclavian, axillary, brachial, basilic, and cephalic demonstrate normal Doppler color flow and compressibility.  No fluid collections identified.               Assessment/Plan:      * Hypercalcemia  - possibly 2.2 calcitonin dose increase recently  - IVF's per Nephrology, was given 1 L in ED  - hold calcitriol, continue sensipar dose at 60 BID 2/2 nausea at high doses          Hypertension  - on amlodipine 10 mg           Hyperparathyroidism  - Sensipar resumed   - calcitriol being held       Chronic kidney disease, stage IV (severe)  - likely d/t volume depletion  - improved from admission Cr at 3.2 plateau, slowly resolving  patient has had one round of HD previous admission, Renal fxn had stabilized and HD is on hold per family  - close follow up with Nephrology        Dementia  - 2/2 Alzheimers   - worsened 2/2 hypercalcemia  - per Nephrology, can resume medication          Generalized weakness  - PT/ OT        LLOYD (acute kidney injury)  - slowly resolving  - likely 2/2 volume depletion  - no need for HD according to nephrology         Constipation  - Continue with bisacodyl suppository & miralax PRN upon discharge      Mobitz type 1 second degree atrioventricular block  - Seen by Cardiology (Dr. Lazo): consider pacer when mental statues improves and not bedridden   - per family, longstanding      Encephalopathy, metabolic  - d/t hypercalcemia on dementia  - improving      Left arm pain  - erythema/ pain to LUE  - doppler negative for DVT      VTE Risk Mitigation         Ordered     heparin (porcine) injection 5,000 Units  Every 12 hours     Route:  Subcutaneous        04/23/17 1614     Medium Risk of VTE  Once      04/18/17 1717     Place sequential compression device   Until discontinued      04/18/17 1717     Place BARBARA hose  Until discontinued      04/18/17 1717          Sheryl Galo PA-C  Department of Hospital Medicine   [facility]

## 2017-04-25 NOTE — PROGRESS NOTES
"Nephrology  Progress Note    Admit Date: 4/18/2017   LOS: 7 days     SUBJECTIVE:     Follow-up For:  Hypercalcemia    Interval History:     Drowsy this am from working with therapies.  Discussed with grandson at bedside.  Renal fxn and corrie labile but overall improved.  Awaiting SNF placement.  Denies pain.     OBJECTIVE:     Vital Signs Range (Last 24H):  /80 (BP Location: Left arm, Patient Position: Lying, BP Method: Automatic)  Pulse 78  Temp 98.5 °F (36.9 °C) (Oral)   Resp 16  Ht 5' 4" (1.626 m)  Wt 56.7 kg (125 lb)  SpO2 96%  Breastfeeding? No  BMI 21.46 kg/m2    Temp:  [97.9 °F (36.6 °C)-98.5 °F (36.9 °C)]   Pulse:  []   Resp:  [16-18]   BP: (136-168)/(60-80)   SpO2:  [95 %-98 %]     I & O (Last 24H):    Intake/Output Summary (Last 24 hours) at 04/25/17 0920  Last data filed at 04/24/17 1200   Gross per 24 hour   Intake              240 ml   Output                0 ml   Net              240 ml       Physical Exam:  General appearance: elderly and ill appearing.    Eyes:  Conjunctivae/corneas clear. PERRL.  Lungs: Normal respiratory effort,   clear to auscultation bilaterally   Heart: Irregular/Irregular rate and rhythm, S1, S2 normal, no murmur, rub or julio c.  Abdomen: Soft, non-tender non-distended; bowel sounds normal; no masses,  no organomegaly  Extremities: No cyanosis or clubbing. No edema.    Skin: Skin color, texture, turgor normal. No rashes or lesions  Neurologic: No focal numbness or weakness   Right Arm AVF:  +    Laboratory Data:  No results for input(s): WBC, RBC, HGB, HCT, PLT, MCV, MCH, MCHC in the last 24 hours.    BMP:     Recent Labs  Lab 04/19/17  0502  04/25/17  0534   GLU 83  < > 70   *  < > 137   K 3.3*  < > 4.5     < > 106   CO2 25  < > 24   BUN 75*  < > 43*   CREATININE 3.5*  < > 3.2*   CALCIUM 13.8*  < > 10.7*   MG 1.9  --   --    < > = values in this interval not displayed.  Lab Results   Component Value Date    .1 (H) 04/18/2017    CALCIUM 10.7 " (H) 04/25/2017    CAION 1.56 (H) 04/25/2017    PHOS 2.5 (L) 04/22/2017             Medications:  Medication list was reviewed and changes noted under Assessment/Plan.    Diagnostic Results:        ASSESSMENT/PLAN:     88 YO WF with LLOYD on CKD4B, Alzheimer's dementia, primary hyperparathyroidism with symptomatic hypercalcemia and volume depletion.        1. Hypercalcemia: Slowly resolving back to baseline.  Total calcium improving but Ionized labile.  Holding Calcitriol/D3.  Now off of nasal calcitonin and seems to be tolerating sensipar.    2. Labile LLOYD on CKD4B: Likely due to volume depletion/ hypercalcemia. No indication for HD. Had recent admission during which she required temporary HD, but she has since stablized and not required HD. She has an intact AVF.   3. HTN: Restarted oral Norvasc but using hydralazine PRN.   4. Alz Dementia: Exacerbated by hypercalcemia. Held meds, but can likely resume but not sure of overall benefit from it. Place all four rails up to keep her free of falls. Encourage family to keep someone at bedside for redirection and ensure she doesn't get out of bed.    5. Hypothyroidism: On synthroid.  TSH/T4 wnl.  6. 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 Lazo following.  No pacer as she has poor functional status and family declines now.   7. Resolved Hypernatremia (E87.0): d/c IVF's.    8. Mild hypokalemia (E87.6):  resolved and monitor.    9. Nutrition: ST eval noted.  Appetite improving.  10. Dispo:  Poor functional status and likelihood of returning to baseline is grim.        Ok to DC to SNF with close f/u with Dr. Arvizu/Yvonne of renal/corrie monitoring.

## 2017-04-25 NOTE — PLAN OF CARE
Problem: Occupational Therapy Goal  Goal: Occupational Therapy Goal  Goals to be met by: 4/30/2017    Patient will increase functional independence with ADLs by performing:    Feeding with Stand-by Assistance.  Grooming while seated with Stand-by Assistance.  Toileting from bedside commode with Moderate Assistance for hygiene and clothing management.   Rolling to Bilateral with Contact Guard Assistance.   Supine to sit with Minimal Assistance.  Toilet transfer to bedside commode with Moderate Assistance.(MET 04/25/17)  Increased functional strength to 4-/5 for RUE; LUE elbow and hand 4-/5.  Upper extremity exercise program 10 reps per handout, with assistance as needed.   Outcome: Ongoing (interventions implemented as appropriate)  Improved level of alertness, communicating and overall functioning on this date.  Continue OT services to progress toward goals.    Comments:   MAGUE Dewitt, 4/25/2017

## 2017-04-25 NOTE — PLAN OF CARE
Problem: Physical Therapy Goal  Goal: Physical Therapy Goal  Goals to be met by: 5/15/17    Patient will increase functional independence with mobility by performin. Supine > sit with supervision.   2. Sit > supine with supervision.   3. Sit <> stand transfer with supervision with rolling walker.   4. Gait > 50 feet with CGA with rolling walker.   5. Ascend/descend curb step with CGA with rolling walker.    Pt progressing towards goals today, able to stand w/ RW and modA and amb'd 10' w/RW and min.A. Recommend cont PT in SNF.

## 2017-04-26 ENCOUNTER — PATIENT OUTREACH (OUTPATIENT)
Dept: ADMINISTRATIVE | Facility: CLINIC | Age: 82
End: 2017-04-26
Payer: MEDICARE

## 2017-04-26 NOTE — PT/OT/SLP DISCHARGE
Occupational Therapy Discharge Summary    Mary Singleton  MRN: [id number]   Hypercalcemia   Patient Discharged from acute Occupational Therapy on 4/25/17  Please refer to prior OT note dated on 4/25/17 for functional status.     Assessment:   Patient has not met goals.  GOALS:   Occupational Therapy Goals        Problem: Occupational Therapy Goal    Goal Priority Disciplines Outcome Interventions   Occupational Therapy Goal     OT, PT/OT Ongoing (interventions implemented as appropriate)    Description:  Goals to be met by: 4/30/2017    Patient will increase functional independence with ADLs by performing:    Feeding with Stand-by Assistance.  Grooming while seated with Stand-by Assistance.  Toileting from bedside commode with Moderate Assistance for hygiene and clothing management.   Rolling to Bilateral with Contact Guard Assistance.   Supine to sit with Minimal Assistance.  Toilet transfer to bedside commode with Moderate Assistance.(MET 04/25/17)  Increased functional strength to 4-/5 for RUE; LUE elbow and hand 4-/5.  Upper extremity exercise program 10 reps per handout, with assistance as needed.               Reasons for Discontinuation of Therapy Services  Transfer to alternate level of care.      Plan:  Patient Discharged to: [facility]

## 2017-04-26 NOTE — PROGRESS NOTES
Physical Therapy Discharge Summary    Mary Singleton  MRN: [id number]   Hypercalcemia   Patient Discharged from acute Physical Therapy on 17.  Please refer to prior PT noted date on 17 for functional status.     Assessment:   Patient has not met goals.  GOALS:   Physical Therapy Goals        Problem: Physical Therapy Goal    Goal Priority Disciplines Outcome Goal Variances Interventions   Physical Therapy Goal     PT/OT, PT Ongoing (interventions implemented as appropriate)     Description:  Goals to be met by: 5/15/17    Patient will increase functional independence with mobility by performin. Supine > sit with supervision.   2. Sit > supine with supervision.   3. Sit <> stand transfer with supervision with rolling walker.   4. Gait > 50 feet with CGA with rolling walker.   5. Ascend/descend curb step with CGA with rolling walker.               Reasons for Discontinuation of Therapy Services  Transfer to alternate level of care.      Plan:  Patient Discharged to: [email or web address]  PT of record not available for documentation.

## 2017-04-26 NOTE — PLAN OF CARE
Report called to [facility] room P113, family and patient notified. In agreement and eager for Transfer to SNF.  Transportation en route- delayed.  Free from falls or skin breakdown this hospital admission.     6:40 PM   Telemetry monitor removed and returned to lender-per Charge RN. Transported to SNF, w Family and belongings.

## 2017-04-26 NOTE — PROGRESS NOTES
Good morning, my name is Naheed Rock, Post Care Coordinator with [facility].  I spoke with Siri, RN with [facility] on Yeimieemli GarciaCupwbysn-[id number] discharge from [facility] to [facility] on yesterday and the nurse have a problem with discharge information.  Can you please call her at [phone]? KAYLA Maguire IM and called Siri, patient prescription was in packet, Siri overlook it.

## 2017-08-07 NOTE — PLAN OF CARE
04/18/17 1508   ED Admissions Case Approval   ED Admissions Case Approval (!) CM Approved  (IP )      Medical Social Work    Met with pt and informed him that he will have to  his medications at [facility]. Pt provided with directions to clinic and is aware that  dept will not pay for medications again.

## 2017-09-12 ENCOUNTER — HOSPITAL ENCOUNTER (EMERGENCY)
Facility: OTHER | Age: 82
Discharge: HOME | End: 2017-09-12
Attending: EMERGENCY MEDICINE
Payer: MEDICARE

## 2017-09-12 VITALS
HEIGHT: 63 IN | RESPIRATION RATE: 20 BRPM | WEIGHT: 137 LBS | HEART RATE: 72 BPM | DIASTOLIC BLOOD PRESSURE: 73 MMHG | OXYGEN SATURATION: 97 % | TEMPERATURE: 98 F | SYSTOLIC BLOOD PRESSURE: 176 MMHG | BODY MASS INDEX: 24.27 KG/M2

## 2017-09-12 DIAGNOSIS — R06.89 ABNORMAL BREATH SOUNDS: ICD-10-CM

## 2017-09-12 DIAGNOSIS — J81.0 ACUTE PULMONARY EDEMA: Primary | ICD-10-CM

## 2017-09-12 LAB
ANION GAP SERPL CALC-SCNC: 11 MMOL/L
BASOPHILS # BLD AUTO: 0.02 K/UL
BASOPHILS NFR BLD: 0.3 %
BNP SERPL-MCNC: 1507 PG/ML
BUN SERPL-MCNC: 66 MG/DL
CALCIUM SERPL-MCNC: 8.2 MG/DL
CHLORIDE SERPL-SCNC: 108 MMOL/L
CO2 SERPL-SCNC: 24 MMOL/L
CREAT SERPL-MCNC: 3.5 MG/DL
DIFFERENTIAL METHOD: ABNORMAL
EOSINOPHIL # BLD AUTO: 0.2 K/UL
EOSINOPHIL NFR BLD: 2.2 %
ERYTHROCYTE [DISTWIDTH] IN BLOOD BY AUTOMATED COUNT: 15 %
EST. GFR  (AFRICAN AMERICAN): 13 ML/MIN/1.73 M^2
EST. GFR  (NON AFRICAN AMERICAN): 11 ML/MIN/1.73 M^2
GLUCOSE SERPL-MCNC: 101 MG/DL
HCT VFR BLD AUTO: 30.4 %
HGB BLD-MCNC: 10 G/DL
LYMPHOCYTES # BLD AUTO: 1.4 K/UL
LYMPHOCYTES NFR BLD: 20.3 %
MCH RBC QN AUTO: 30.1 PG
MCHC RBC AUTO-ENTMCNC: 32.9 G/DL
MCV RBC AUTO: 92 FL
MONOCYTES # BLD AUTO: 0.4 K/UL
MONOCYTES NFR BLD: 5.8 %
NEUTROPHILS # BLD AUTO: 4.8 K/UL
NEUTROPHILS NFR BLD: 71 %
PLATELET # BLD AUTO: 266 K/UL
PMV BLD AUTO: 9.9 FL
POTASSIUM SERPL-SCNC: 4.4 MMOL/L
RBC # BLD AUTO: 3.32 M/UL
SODIUM SERPL-SCNC: 143 MMOL/L
WBC # BLD AUTO: 6.76 K/UL

## 2017-09-12 PROCEDURE — 99284 EMERGENCY DEPT VISIT MOD MDM: CPT

## 2017-09-12 PROCEDURE — 85025 COMPLETE CBC W/AUTO DIFF WBC: CPT

## 2017-09-12 PROCEDURE — 83880 ASSAY OF NATRIURETIC PEPTIDE: CPT

## 2017-09-12 PROCEDURE — 80048 BASIC METABOLIC PNL TOTAL CA: CPT

## 2017-09-12 NOTE — ED NOTES
"Pt accompanied with daughter to ED. Pt with PMH of dementia, Per daughter is at baseline neuro status, pt seen by home health nurse today and home health nurse "heard something on one side, unsure what." Pt with increased RR in which daughter states "she's been having this though", Pt denies SOB, chills, fever. Pt alert, and appropriate at this time. Respirations even and unlabored. No acute distress noted. Pt denies pain.   "

## 2017-09-12 NOTE — ED PROVIDER NOTES
Encounter Date: 9/12/2017       History     Chief Complaint   Patient presents with    Shortness of Breath     pt 's home health nurse states  some lung congestion on exam . advised family to bring her to er.     89-year-old female with dementia, CKD, hyperparathyroidism, hypertension on osteoporosis presents to the emergency department with her daughter with complaints of abnormal breath sounds.  Patient's daughter states that the home health nurse reported that they heard something that felt like congestion in her lungs.  Her daughter denies any cough, fever, signs/complaints of shortness of breath.       The history is provided by a relative.     Review of patient's allergies indicates:   Allergen Reactions    Penicillins      Doesn't remember it has been years     Past Medical History:   Diagnosis Date    CKD (chronic kidney disease), stage IV     Dementia 11/2/2016 2012: Diagnosed with Alzheimer's Dementia.    HPTH (hyperparathyroidism)     Hypertension     Osteoporosis      Past Surgical History:   Procedure Laterality Date    parathyroid removal       Family History   Problem Relation Age of Onset    Hypertension Father     Cancer Sister     Hearing loss Sister      Social History   Substance Use Topics    Smoking status: Never Smoker    Smokeless tobacco: Never Used    Alcohol use No     Review of Systems   Constitutional: Negative for chills and fever.   HENT: Positive for congestion. Negative for sore throat.    Respiratory: Negative for cough, chest tightness, shortness of breath and wheezing.    Cardiovascular: Negative for chest pain.   Gastrointestinal: Negative for nausea and vomiting.   Genitourinary: Negative for dysuria.   Musculoskeletal: Negative for back pain.   Skin: Negative for rash.   Neurological: Negative for weakness.   Hematological: Does not bruise/bleed easily.       Physical Exam     Initial Vitals [09/12/17 1550]   BP Pulse Resp Temp SpO2   (!) 177/81 92 20 98.4 °F  (36.9 °C) 99 %      MAP       113         Physical Exam    Nursing note and vitals reviewed.  Constitutional: Vital signs are normal. She appears well-developed and well-nourished.  Non-toxic appearance. No distress.   HENT:   Head: Normocephalic and atraumatic.   Right Ear: External ear normal.   Left Ear: External ear normal.   Nose: Nose normal.   Eyes: Conjunctivae and lids are normal. No scleral icterus.   Neck: Normal range of motion and phonation normal. Neck supple.   Cardiovascular: Normal rate, regular rhythm and normal heart sounds. Exam reveals no gallop and no friction rub.    No murmur heard.  1+ pitting edema to bilateral LEs   Pulmonary/Chest: Effort normal and breath sounds normal. No respiratory distress. She has no decreased breath sounds. She has no wheezes. She has no rhonchi. She has no rales.   Abdominal: Normal appearance.   Musculoskeletal: Normal range of motion.   No obvious deformities, moving all extremities, ambulatory with walker  Kyphosis present   Neurological: She is alert.   Skin: Skin is warm, dry and intact. No lesion and no rash noted. No erythema.   Psychiatric: She has a normal mood and affect. Her speech is normal and behavior is normal. Judgment normal. Cognition and memory are normal.         ED Course   Procedures  Labs Reviewed   CBC W/ AUTO DIFFERENTIAL - Abnormal; Notable for the following:        Result Value    RBC 3.32 (*)     Hemoglobin 10.0 (*)     Hematocrit 30.4 (*)     RDW 15.0 (*)     All other components within normal limits   BASIC METABOLIC PANEL - Abnormal; Notable for the following:     BUN, Bld 66 (*)     Creatinine 3.5 (*)     Calcium 8.2 (*)     eGFR if  13 (*)     eGFR if non  11 (*)     All other components within normal limits   B-TYPE NATRIURETIC PEPTIDE - Abnormal; Notable for the following:     BNP 1,507 (*)     All other components within normal limits        Imaging Results          X-Ray Chest PA And Lateral  (Final result)  Result time 09/12/17 18:14:16    Final result by Ethan King MD (09/12/17 18:14:16)                 Impression:      As above.      Electronically signed by: ETHAN KING MD  Date:     09/12/17  Time:    18:14              Narrative:    Chest PA and lateral.  Comparison: 1/30/17, 4/24/17.    Cardiac silhouette is mildly enlarged but stable in size.  There is stable tortuosity of the aorta.  Lungs are symmetrically expanded.  Blunting of the right costophrenic angle suggests small effusion.  Mild chronic lung changes seen with increased bilateral interstitial markings reflect mild pulmonary edema.  No evidence of pneumothorax.  There is prominent kyphosis of the thoracic spine with anterior wedge deformity seen involving the lower thoracic levels, stable.  No free air visualized beneath the diaphragm.                                 Medical Decision Making:   History:   I obtained history from: someone other than patient.       <> Summary of History: daughter  Old Medical Records: I decided to obtain old medical records.  Clinical Tests:   Lab Tests: Ordered and Reviewed  Radiological Study: Ordered and Reviewed  ED Management:  Chest x-ray obtained.  There are multiple chronic changes including mildly enlarged cardiac silhouette which is stable.  She has a stable left knee of the aorta.  She has mild chronic lung changes seen with increased bilateral interstitial markings reflective of mild pulmonary edema.  She does have blunting of the right costophrenic angle suggesting a small effusion.  No evidence of pneumothorax.  Prominent kyphosis of the thoracic spine.  Stable finding.  No free air visualized below the diaphragm.  Discussed with nephrology on call, Dr. Nelson.  He states that patient's labs are benign she could follow up in clinic.  He does state that patient was previously on dialysis however improved.  He states that she is maintained kidney function of CKD4.  He states that  one time they did discuss palliative care.  Discussed chest x-ray findings with patient's daughter.  She now admits that her has recently had some increased swelling to her lower extremities.  At this time I do feel that labs indicated despite patient being asymptomatic.  No elevation in white blood cell count.  Her H&H is stable at 10 and 30.  BUN/creatinine creatinine elevated at 66 and 3.5 which is near patient's baseline.  BNP is elevated at 1500.  Previous BNP was around 600 in January.  8:32 PM discussed with Dr. Nelson who states that he is comfortable with her following up this week with Dr. Arvizu, nephrology and Dr. Lazo, cardiology.  Patient's daughter informed of all results and urged return for any worsening symptoms.  They state understanding.  Patient continues to deny any shortness of breath.  Oxygen saturations remained high.  She is stable at time of discharge.  This patient was discussed with the attending physician who agrees with treatment plan.  Other:   I have discussed this case with another health care provider.       <> Summary of the Discussion: Yarely  This note was created using Dragon Medical dictation.  There may be typographical errors secondary to dictation.                     ED Course      Clinical Impression:     1. Acute pulmonary edema    2. Abnormal breath sounds          Disposition:   Disposition: Discharged  Condition: Stable                        Parul Lai PA-C  09/12/17 204

## 2017-09-13 NOTE — ED TRIAGE NOTES
Patient presents to ED via EMS for a medication refill. She states she ran out of her percocet and was unable to make an appointment with her MD until next week. Patient c/o pain to her R lower leg related to hx of tib/fib fracture.

## 2017-09-14 PROBLEM — I50.32 HEART FAILURE, DIASTOLIC, CHRONIC: Status: ACTIVE | Noted: 2017-01-30

## 2017-09-21 ENCOUNTER — HOSPITAL ENCOUNTER (EMERGENCY)
Facility: OTHER | Age: 82
Discharge: HOME | End: 2017-09-21
Attending: EMERGENCY MEDICINE
Payer: MEDICARE

## 2017-09-21 VITALS
HEART RATE: 86 BPM | TEMPERATURE: 100 F | RESPIRATION RATE: 18 BRPM | SYSTOLIC BLOOD PRESSURE: 163 MMHG | WEIGHT: 128 LBS | OXYGEN SATURATION: 96 % | HEIGHT: 63 IN | BODY MASS INDEX: 22.68 KG/M2

## 2017-09-21 DIAGNOSIS — E86.0 DEHYDRATION: Primary | ICD-10-CM

## 2017-09-21 DIAGNOSIS — K59.00 CONSTIPATION, UNSPECIFIED CONSTIPATION TYPE: ICD-10-CM

## 2017-09-21 LAB
ALBUMIN SERPL BCP-MCNC: 3.6 G/DL
ALP SERPL-CCNC: 78 U/L
ALT SERPL W/O P-5'-P-CCNC: 15 U/L
ANION GAP SERPL CALC-SCNC: 14 MMOL/L
AST SERPL-CCNC: 21 U/L
BACTERIA #/AREA URNS HPF: ABNORMAL /HPF
BASOPHILS # BLD AUTO: 0.01 K/UL
BASOPHILS NFR BLD: 0.2 %
BILIRUB SERPL-MCNC: 0.4 MG/DL
BILIRUB UR QL STRIP: NEGATIVE
BUN SERPL-MCNC: 73 MG/DL
CALCIUM SERPL-MCNC: 9.6 MG/DL
CHLORIDE SERPL-SCNC: 104 MMOL/L
CLARITY UR: CLEAR
CO2 SERPL-SCNC: 28 MMOL/L
COLOR UR: YELLOW
CREAT SERPL-MCNC: 4 MG/DL
DIFFERENTIAL METHOD: ABNORMAL
EOSINOPHIL # BLD AUTO: 0.1 K/UL
EOSINOPHIL NFR BLD: 2.2 %
ERYTHROCYTE [DISTWIDTH] IN BLOOD BY AUTOMATED COUNT: 14.7 %
EST. GFR  (AFRICAN AMERICAN): 11 ML/MIN/1.73 M^2
EST. GFR  (NON AFRICAN AMERICAN): 9 ML/MIN/1.73 M^2
GLUCOSE SERPL-MCNC: 127 MG/DL
GLUCOSE UR QL STRIP: NEGATIVE
HCT VFR BLD AUTO: 34.3 %
HGB BLD-MCNC: 11.3 G/DL
HGB UR QL STRIP: ABNORMAL
HYALINE CASTS #/AREA URNS LPF: 3 /LPF
KETONES UR QL STRIP: NEGATIVE
LEUKOCYTE ESTERASE UR QL STRIP: NEGATIVE
LYMPHOCYTES # BLD AUTO: 1.1 K/UL
LYMPHOCYTES NFR BLD: 18.1 %
MCH RBC QN AUTO: 30.3 PG
MCHC RBC AUTO-ENTMCNC: 32.9 G/DL
MCV RBC AUTO: 92 FL
MICROSCOPIC COMMENT: ABNORMAL
MONOCYTES # BLD AUTO: 0.4 K/UL
MONOCYTES NFR BLD: 5.9 %
NEUTROPHILS # BLD AUTO: 4.6 K/UL
NEUTROPHILS NFR BLD: 73.1 %
NITRITE UR QL STRIP: NEGATIVE
PH UR STRIP: 6 [PH] (ref 5–8)
PLATELET # BLD AUTO: 270 K/UL
PMV BLD AUTO: 10.3 FL
POTASSIUM SERPL-SCNC: 3.4 MMOL/L
PROT SERPL-MCNC: 7.6 G/DL
PROT UR QL STRIP: ABNORMAL
RBC # BLD AUTO: 3.73 M/UL
RBC #/AREA URNS HPF: 4 /HPF (ref 0–4)
SODIUM SERPL-SCNC: 146 MMOL/L
SP GR UR STRIP: 1.02 (ref 1–1.03)
SQUAMOUS #/AREA URNS HPF: 5 /HPF
URN SPEC COLLECT METH UR: ABNORMAL
UROBILINOGEN UR STRIP-ACNC: NEGATIVE EU/DL
WBC # BLD AUTO: 6.23 K/UL
WBC #/AREA URNS HPF: 5 /HPF (ref 0–5)

## 2017-09-21 PROCEDURE — 25000003 PHARM REV CODE 250: Performed by: EMERGENCY MEDICINE

## 2017-09-21 PROCEDURE — 96360 HYDRATION IV INFUSION INIT: CPT

## 2017-09-21 PROCEDURE — 99284 EMERGENCY DEPT VISIT MOD MDM: CPT | Mod: 25

## 2017-09-21 PROCEDURE — 93010 ELECTROCARDIOGRAM REPORT: CPT | Mod: ,,, | Performed by: INTERNAL MEDICINE

## 2017-09-21 PROCEDURE — 80053 COMPREHEN METABOLIC PANEL: CPT

## 2017-09-21 PROCEDURE — 85025 COMPLETE CBC W/AUTO DIFF WBC: CPT

## 2017-09-21 PROCEDURE — 96361 HYDRATE IV INFUSION ADD-ON: CPT

## 2017-09-21 PROCEDURE — 81000 URINALYSIS NONAUTO W/SCOPE: CPT

## 2017-09-21 PROCEDURE — 93005 ELECTROCARDIOGRAM TRACING: CPT

## 2017-09-21 RX ORDER — SODIUM CHLORIDE 9 MG/ML
1000 INJECTION, SOLUTION INTRAVENOUS
Status: COMPLETED | OUTPATIENT
Start: 2017-09-21 | End: 2017-09-21

## 2017-09-21 RX ORDER — FUROSEMIDE 40 MG/1
40 TABLET ORAL 2 TIMES DAILY
Status: ON HOLD | COMMUNITY
End: 2017-12-02 | Stop reason: HOSPADM

## 2017-09-21 RX ADMIN — SODIUM CHLORIDE 1000 ML: 0.9 INJECTION, SOLUTION INTRAVENOUS at 06:09

## 2017-09-21 NOTE — ED NOTES
Two patient identifiers have been checked and are correct.      Appearance: Pt awake, alert & oriented to person, place & time. Pt in no acute distress at present time. Pt is clean and well groomed with clothes appropriately fastened.   Skin: Skin warm, dry & intact. Color consistent with ethnicity. Mucous membranes moist. No breakdown or brusing noted.   Head: +foul smell possibly from mouth  Musculoskeletal: Patient moving all extremities well, no obvious swelling or deformities noted.   Respiratory: Respirations spontaneous, even, and non-labored. Visible chest rise noted. Airway is open and patent. No accessory muscle use noted.   Neurologic: Sensation is intact. Speech is clear and appropriate. Eyes open spontaneously, behavior appropriate to situation, follows commands, facial expression symmetrical, bilateral hand grasp equal and even, purposeful motor response noted.  Cardiac: All peripheral pulses present. No Bilateral lower extremity edema. Cap refill is <3 seconds.  Abdomen: Abdomen soft, non-tender to palpation. Denies N/V/D, +decreased appetite  : Pt reports no dysuria or hematuria.

## 2017-09-21 NOTE — ED NOTES
"Pt to ED with daughter at bedside reporting decreased appetite with "foul smell" since Saturday. Daughter reports pt usually eats 3 large meals a day and has since decreased intake since Saturday, pt denies N/V/D. Pt daughter reports smelling foul smell and thinking pt has BM, but pt was clean. Pt daughter reports home health nurse reports also smelling foul smell and thinking it was BM- daughter thought maybe it was a bad tooth but dentist reports teeth look fine. Daughter states dentist reported smell might be a "GI problem". Pt AAOx4 and appropriate at this time. Respirations even and unlabored. No acute distress noted.   "

## 2017-09-21 NOTE — ED PROVIDER NOTES
"Encounter Date: 2017    SCRIBE #1 NOTE: I, Carmel Serrano, am scribing for, and in the presence of, Dr. Ham.       History     Chief Complaint   Patient presents with    not eating     pt family states she is not eating well since friday . pt with foul breath per family and mouth pain . saw dentest and cleared     Time seen by provider: 4:03 PM    This is a 89 y.o. female who presents to the ED on the referral of Dr. Arvizu with complaint of decreased appetite that has persisted for the past 6 days.  As per her daughter, the patient has been complaining of mouth pain as well.  However, she was evaluated by her dentist, who saw no acute abnormalities.  Her daughter also notes that the patient has had a "strange smell" emanating from her mouth, particularly after she belches.  On arrival to the ED, the patient reports no complaints, including fever, chills, SOB, CP, abdominal pain, N/V/D, and constipation.  Despite taking her presribed appetite stimulator, megace, she has found no relief from her symptoms.  She reports no other identifying, alleviating, or exacerbating factors.  Her daughter notes that the patient was seen at this facility 9 days ago for SOB and leg swelling.  She admits that these symptoms have since resolved.  As per medical records, the patient has history of HTN, hyperkalemia, dementia, and Stage IV CKD.  She has surgical history of hernia repair, , and parathyroidectomy.          The history is provided by the patient and a relative (daughter).     Review of patient's allergies indicates:   Allergen Reactions    Penicillins      Doesn't remember it has been years     Past Medical History:   Diagnosis Date    CKD (chronic kidney disease), stage IV     Dementia 2016    2012: Diagnosed with Alzheimer's Dementia.    HPTH (hyperparathyroidism)     Hypertension     Osteoporosis      Past Surgical History:   Procedure Laterality Date    parathyroid removal       Family History "   Problem Relation Age of Onset    Hypertension Father     Cancer Sister     Hearing loss Sister      Social History   Substance Use Topics    Smoking status: Never Smoker    Smokeless tobacco: Never Used    Alcohol use No     Review of Systems   Constitutional: Positive for appetite change (decreased). Negative for chills and fever.   HENT: Negative for facial swelling.         Positive for mouth pain.  Positive for malodorous breath.   Respiratory: Negative for shortness of breath.    Cardiovascular: Negative for chest pain and leg swelling.   Gastrointestinal: Negative for abdominal pain, constipation, diarrhea, nausea and vomiting.   Endocrine: Negative for polyuria.   Genitourinary: Negative for dysuria.   Musculoskeletal: Negative for myalgias.   Skin: Negative for rash.   Neurological: Negative for headaches.       Physical Exam     Initial Vitals [09/21/17 1526]   BP Pulse Resp Temp SpO2   (!) 163/0 92 18 99.5 °F (37.5 °C) 97 %      MAP       54.33         Physical Exam    Nursing note and vitals reviewed.  Constitutional: She appears well-developed and well-nourished. She is not diaphoretic. No distress.   HENT:   Head: Normocephalic and atraumatic.   Right Ear: External ear normal.   Left Ear: External ear normal.   Eyes: EOM are normal. Right eye exhibits no discharge. Left eye exhibits no discharge.   Neck: Normal range of motion.   Cardiovascular: Normal rate, regular rhythm and normal heart sounds. Exam reveals no gallop and no friction rub.    No murmur heard.  Pulmonary/Chest: Breath sounds normal. No respiratory distress. She has no wheezes. She has no rhonchi. She has no rales.   Abdominal: Soft. There is no tenderness. There is no rebound and no guarding.   Musculoskeletal: Normal range of motion. She exhibits no edema or tenderness.   Neurological: She is alert and oriented to person, place, and time.   Skin: Skin is warm and dry. No rash and no abscess noted. No erythema. No pallor.    Psychiatric: She has a normal mood and affect. Her behavior is normal. Judgment and thought content normal.         ED Course   Procedures  Labs Reviewed   CBC W/ AUTO DIFFERENTIAL - Abnormal; Notable for the following:        Result Value    RBC 3.73 (*)     Hemoglobin 11.3 (*)     Hematocrit 34.3 (*)     RDW 14.7 (*)     Gran% 73.1 (*)     All other components within normal limits   COMPREHENSIVE METABOLIC PANEL - Abnormal; Notable for the following:     Sodium 146 (*)     Potassium 3.4 (*)     Glucose 127 (*)     BUN, Bld 73 (*)     Creatinine 4.0 (*)     eGFR if  11 (*)     eGFR if non  9 (*)     All other components within normal limits   URINALYSIS - Abnormal; Notable for the following:     Protein, UA 3+ (*)     Occult Blood UA 1+ (*)     All other components within normal limits   URINALYSIS MICROSCOPIC - Abnormal; Notable for the following:     Hyaline Casts, UA 3 (*)     All other components within normal limits      Imaging Results          X-Ray Abdomen Flat And Erect (Final result)  Result time 09/21/17 17:23:17    Final result by Jatinder De La Rosa MD (09/21/17 17:23:17)                 Impression:      1.  Nonobstructive bowel gas pattern, noting possible constipation.      Electronically signed by: JATINDER DE LA ROSA MD  Date:     09/21/17  Time:    17:23              Narrative:    Abdomen flat and erect    History: Abdominal pain    Comparison: None    Findings:  One upright view, 2 supine views.    Upright view is limited due to patient positioning.  No convincing significant air-fluid levels.  No large volume free air or pneumatosis.  Air and stool is seen within large bowel, and projected over the rectum noted moderate stool in the right colon and rectum, could reflect constipation.  There is gas within the stomach.  No focally dilated small bowel loops.  The lower lung zones are grossly clear.  Degenerative changes are noted throughout the spine.  There is vascular  calcification.  There is a punctate focus of calcification projected in the region of the left kidney, could reflect nephrolithiasis or content within the superimposed bowel.  No definite cholelithiasis or left nephrolithiasis.                              EKG Readings: (Independently Interpreted)   Initial Reading: No STEMI.   Sinus rhythm. Rate of 90. Widened QRS due to RBBB. No ischemia or arrhythmia.  No change from prior EKG in 04/2017.        X-Rays:   Independently Interpreted Readings:   Other Readings:  X-Ray Abdomen Flat And Erect: Colonic stool.  No air fluid levels.    Medical Decision Making:   History:   Old Medical Records: I decided to obtain old medical records.  Independently Interpreted Test(s):   I have ordered and independently interpreted X-rays - see prior notes.  I have ordered and independently interpreted EKG Reading(s) - see prior notes  Clinical Tests:   Lab Tests: Ordered and Reviewed  Radiological Study: Ordered and Reviewed  Medical Tests: Ordered and Reviewed            Scribe Attestation:   Scribe #1: I performed the above scribed service and the documentation accurately describes the services I performed. I attest to the accuracy of the note.    Attending Attestation:           Physician Attestation for Scribe:  Physician Attestation Statement for Scribe #1: I, Dr. Ham, reviewed documentation, as scribed by Carmel Serrano in my presence, and it is both accurate and complete.                 ED Course      Patient presents accompanied by daughter who helps provide history.  She has had decreased appetite for the past few days.  The daughter also thought that her breath felt strange or fetid however she is not having any fever abdominal pain or vomiting.  She did have 1 prior episode of what sounds like incarcerated hernia or bowel blockage that the daughter was worried might be recurrent however she is not having any vomiting and abdominal pain as at that prior episodes.  On exam  abdomen is nontender.  Vital signs are stable.  Blood pressure was 163/90 at triage (typo of 163/0)  abdominal x-ray shows constipation but no evidence of obstruction her laboratory studies show chronic anemia, improved from prior also with chronic renal insufficiency, now 4.0 up from 3.5 mild hyponatremia/hypokalemia.  Given IV fluids during observation.  Repeat abdominal exam remains nontender.  No emesis.  At this time her symptoms did and exam, x-ray do not suggest obstruction and I do not think a CT scan is necessary to treat for the constipation seen on abdominal x-ray, encouraged oral fluids at home.  Encouraged follow-up with primary care, especially if symptoms persist.  Return to the emergency department for new/worsening symptoms.  Especially fever abdominal pain and vomiting.  Clinical Impression:   The primary encounter diagnosis was Dehydration. A diagnosis of Constipation, unspecified constipation type was also pertinent to this visit.                           Toni Ham II, MD  09/21/17 2991

## 2017-09-22 NOTE — ED NOTES
Pt laying in bed, awake, alert.  Side rails up x 2.  Call light within reach, daughter at the bedside.  Will continue to monitor.

## 2017-12-01 ENCOUNTER — HOSPITAL ENCOUNTER (INPATIENT)
Facility: OTHER | Age: 82
LOS: 1 days | Discharge: HOSPICE | DRG: 641 | End: 2017-12-02
Attending: EMERGENCY MEDICINE | Admitting: HOSPITALIST
Payer: MEDICARE

## 2017-12-01 DIAGNOSIS — F02.80 LATE ONSET ALZHEIMER'S DISEASE WITHOUT BEHAVIORAL DISTURBANCE: ICD-10-CM

## 2017-12-01 DIAGNOSIS — E87.0 HYPERNATREMIA: Primary | ICD-10-CM

## 2017-12-01 DIAGNOSIS — G30.1 LATE ONSET ALZHEIMER'S DISEASE WITHOUT BEHAVIORAL DISTURBANCE: ICD-10-CM

## 2017-12-01 DIAGNOSIS — N28.9 RENAL INSUFFICIENCY: ICD-10-CM

## 2017-12-01 DIAGNOSIS — E87.8 HYPERCHLOREMIA: ICD-10-CM

## 2017-12-01 DIAGNOSIS — N18.4 CHRONIC KIDNEY DISEASE, STAGE IV (SEVERE): ICD-10-CM

## 2017-12-01 LAB
ALBUMIN SERPL BCP-MCNC: 3.4 G/DL
ALP SERPL-CCNC: 74 U/L
ALT SERPL W/O P-5'-P-CCNC: 20 U/L
ANION GAP SERPL CALC-SCNC: ABNORMAL MMOL/L
ANISOCYTOSIS BLD QL SMEAR: SLIGHT
AST SERPL-CCNC: 19 U/L
BACTERIA #/AREA URNS HPF: ABNORMAL /HPF
BASOPHILS # BLD AUTO: ABNORMAL K/UL
BASOPHILS NFR BLD: 1 %
BILIRUB SERPL-MCNC: 0.5 MG/DL
BILIRUB UR QL STRIP: NEGATIVE
BUN SERPL-MCNC: 128 MG/DL
CALCIUM SERPL-MCNC: 9.6 MG/DL
CHLORIDE SERPL-SCNC: >130 MMOL/L
CLARITY UR: ABNORMAL
CO2 SERPL-SCNC: 25 MMOL/L
COLOR UR: YELLOW
CREAT SERPL-MCNC: 6.6 MG/DL
DIFFERENTIAL METHOD: ABNORMAL
EOSINOPHIL # BLD AUTO: ABNORMAL K/UL
EOSINOPHIL NFR BLD: 1 %
ERYTHROCYTE [DISTWIDTH] IN BLOOD BY AUTOMATED COUNT: 14.7 %
EST. GFR  (AFRICAN AMERICAN): 6 ML/MIN/1.73 M^2
EST. GFR  (NON AFRICAN AMERICAN): 5 ML/MIN/1.73 M^2
GIANT PLATELETS BLD QL SMEAR: PRESENT
GLUCOSE SERPL-MCNC: 105 MG/DL
GLUCOSE UR QL STRIP: NEGATIVE
HCT VFR BLD AUTO: 35.6 %
HGB BLD-MCNC: 10.8 G/DL
HGB UR QL STRIP: ABNORMAL
HYALINE CASTS #/AREA URNS LPF: 0 /LPF
KETONES UR QL STRIP: NEGATIVE
LEUKOCYTE ESTERASE UR QL STRIP: NEGATIVE
LIPASE SERPL-CCNC: 42 U/L
LYMPHOCYTES # BLD AUTO: ABNORMAL K/UL
LYMPHOCYTES NFR BLD: 18 %
MAGNESIUM SERPL-MCNC: 2.7 MG/DL
MCH RBC QN AUTO: 30.6 PG
MCHC RBC AUTO-ENTMCNC: 30.3 G/DL
MCV RBC AUTO: 101 FL
METAMYELOCYTES NFR BLD MANUAL: 1 %
MICROSCOPIC COMMENT: ABNORMAL
MONOCYTES # BLD AUTO: ABNORMAL K/UL
MONOCYTES NFR BLD: 5 %
NEUTROPHILS NFR BLD: 74 %
NITRITE UR QL STRIP: POSITIVE
PH UR STRIP: 6 [PH] (ref 5–8)
PLATELET # BLD AUTO: 230 K/UL
PLATELET BLD QL SMEAR: ABNORMAL
PMV BLD AUTO: 10.9 FL
POLYCHROMASIA BLD QL SMEAR: ABNORMAL
POTASSIUM SERPL-SCNC: 4.5 MMOL/L
PROT SERPL-MCNC: 7.1 G/DL
PROT UR QL STRIP: ABNORMAL
RBC # BLD AUTO: 3.53 M/UL
RBC #/AREA URNS HPF: 1 /HPF (ref 0–4)
SODIUM SERPL-SCNC: 171 MMOL/L
SP GR UR STRIP: 1.02 (ref 1–1.03)
URN SPEC COLLECT METH UR: ABNORMAL
UROBILINOGEN UR STRIP-ACNC: NEGATIVE EU/DL
WBC # BLD AUTO: 7.77 K/UL
WBC #/AREA URNS HPF: 5 /HPF (ref 0–5)

## 2017-12-01 PROCEDURE — 11000001 HC ACUTE MED/SURG PRIVATE ROOM

## 2017-12-01 PROCEDURE — 83690 ASSAY OF LIPASE: CPT

## 2017-12-01 PROCEDURE — S5010 5% DEXTROSE AND 0.45% SALINE: HCPCS | Performed by: EMERGENCY MEDICINE

## 2017-12-01 PROCEDURE — 93005 ELECTROCARDIOGRAM TRACING: CPT

## 2017-12-01 PROCEDURE — 96360 HYDRATION IV INFUSION INIT: CPT

## 2017-12-01 PROCEDURE — 80053 COMPREHEN METABOLIC PANEL: CPT

## 2017-12-01 PROCEDURE — 25000003 PHARM REV CODE 250: Performed by: EMERGENCY MEDICINE

## 2017-12-01 PROCEDURE — 83735 ASSAY OF MAGNESIUM: CPT

## 2017-12-01 PROCEDURE — 85027 COMPLETE CBC AUTOMATED: CPT

## 2017-12-01 PROCEDURE — 96361 HYDRATE IV INFUSION ADD-ON: CPT

## 2017-12-01 PROCEDURE — 99284 EMERGENCY DEPT VISIT MOD MDM: CPT | Mod: 25

## 2017-12-01 PROCEDURE — 85007 BL SMEAR W/DIFF WBC COUNT: CPT

## 2017-12-01 PROCEDURE — 93010 ELECTROCARDIOGRAM REPORT: CPT | Mod: ,,, | Performed by: INTERNAL MEDICINE

## 2017-12-01 PROCEDURE — 81000 URINALYSIS NONAUTO W/SCOPE: CPT

## 2017-12-01 RX ORDER — DEXTROSE MONOHYDRATE AND SODIUM CHLORIDE 5; .45 G/100ML; G/100ML
1000 INJECTION, SOLUTION INTRAVENOUS
Status: COMPLETED | OUTPATIENT
Start: 2017-12-01 | End: 2017-12-01

## 2017-12-01 RX ORDER — DEXTROSE MONOHYDRATE 50 MG/ML
1000 INJECTION, SOLUTION INTRAVENOUS
Status: ACTIVE | OUTPATIENT
Start: 2017-12-01 | End: 2017-12-02

## 2017-12-01 RX ORDER — NAPROXEN SODIUM 220 MG/1
81 TABLET, FILM COATED ORAL DAILY
COMMUNITY

## 2017-12-01 RX ADMIN — DEXTROSE AND SODIUM CHLORIDE 1000 ML: 5; .45 INJECTION, SOLUTION INTRAVENOUS at 10:12

## 2017-12-02 VITALS
TEMPERATURE: 98 F | RESPIRATION RATE: 16 BRPM | OXYGEN SATURATION: 100 % | HEART RATE: 80 BPM | BODY MASS INDEX: 22.96 KG/M2 | WEIGHT: 124.75 LBS | DIASTOLIC BLOOD PRESSURE: 86 MMHG | SYSTOLIC BLOOD PRESSURE: 137 MMHG | HEIGHT: 62 IN

## 2017-12-02 LAB
ANION GAP SERPL CALC-SCNC: 10 MMOL/L
ANION GAP SERPL CALC-SCNC: 12 MMOL/L
ANION GAP SERPL CALC-SCNC: 13 MMOL/L
ANION GAP SERPL CALC-SCNC: ABNORMAL MMOL/L
BASOPHILS # BLD AUTO: 0.01 K/UL
BASOPHILS NFR BLD: 0.1 %
BUN SERPL-MCNC: 113 MG/DL
BUN SERPL-MCNC: 115 MG/DL
BUN SERPL-MCNC: 118 MG/DL
BUN SERPL-MCNC: 122 MG/DL
CALCIUM SERPL-MCNC: 8.8 MG/DL
CALCIUM SERPL-MCNC: 9.1 MG/DL
CHLORIDE SERPL-SCNC: 124 MMOL/L
CHLORIDE SERPL-SCNC: 130 MMOL/L
CHLORIDE SERPL-SCNC: 130 MMOL/L
CHLORIDE SERPL-SCNC: >130 MMOL/L
CO2 SERPL-SCNC: 21 MMOL/L
CO2 SERPL-SCNC: 24 MMOL/L
CO2 SERPL-SCNC: 25 MMOL/L
CO2 SERPL-SCNC: 26 MMOL/L
CREAT SERPL-MCNC: 5.6 MG/DL
CREAT SERPL-MCNC: 5.8 MG/DL
CREAT SERPL-MCNC: 6 MG/DL
CREAT SERPL-MCNC: 6.3 MG/DL
DIFFERENTIAL METHOD: ABNORMAL
EOSINOPHIL # BLD AUTO: 0.1 K/UL
EOSINOPHIL NFR BLD: 1.2 %
ERYTHROCYTE [DISTWIDTH] IN BLOOD BY AUTOMATED COUNT: 14.6 %
EST. GFR  (AFRICAN AMERICAN): 6 ML/MIN/1.73 M^2
EST. GFR  (AFRICAN AMERICAN): 7 ML/MIN/1.73 M^2
EST. GFR  (NON AFRICAN AMERICAN): 5 ML/MIN/1.73 M^2
EST. GFR  (NON AFRICAN AMERICAN): 6 ML/MIN/1.73 M^2
GLUCOSE SERPL-MCNC: 101 MG/DL
GLUCOSE SERPL-MCNC: 112 MG/DL
GLUCOSE SERPL-MCNC: 122 MG/DL
GLUCOSE SERPL-MCNC: 131 MG/DL
HCT VFR BLD AUTO: 33.7 %
HGB BLD-MCNC: 10.1 G/DL
LYMPHOCYTES # BLD AUTO: 1.6 K/UL
LYMPHOCYTES NFR BLD: 22.3 %
MAGNESIUM SERPL-MCNC: 2.6 MG/DL
MCH RBC QN AUTO: 30.3 PG
MCHC RBC AUTO-ENTMCNC: 30 G/DL
MCV RBC AUTO: 101 FL
MONOCYTES # BLD AUTO: 0.5 K/UL
MONOCYTES NFR BLD: 6.1 %
NEUTROPHILS # BLD AUTO: 5 K/UL
NEUTROPHILS NFR BLD: 68.8 %
PHOSPHATE SERPL-MCNC: 5.3 MG/DL
PLATELET # BLD AUTO: 210 K/UL
PMV BLD AUTO: 11 FL
POTASSIUM SERPL-SCNC: 4 MMOL/L
POTASSIUM SERPL-SCNC: 4.1 MMOL/L
POTASSIUM SERPL-SCNC: 4.1 MMOL/L
POTASSIUM SERPL-SCNC: 4.5 MMOL/L
RBC # BLD AUTO: 3.33 M/UL
SODIUM SERPL-SCNC: 160 MMOL/L
SODIUM SERPL-SCNC: 164 MMOL/L
SODIUM SERPL-SCNC: 167 MMOL/L
SODIUM SERPL-SCNC: 169 MMOL/L
TSH SERPL DL<=0.005 MIU/L-ACNC: 0.69 UIU/ML
WBC # BLD AUTO: 7.34 K/UL

## 2017-12-02 PROCEDURE — 80048 BASIC METABOLIC PNL TOTAL CA: CPT | Mod: 91

## 2017-12-02 PROCEDURE — 36415 COLL VENOUS BLD VENIPUNCTURE: CPT

## 2017-12-02 PROCEDURE — 84100 ASSAY OF PHOSPHORUS: CPT

## 2017-12-02 PROCEDURE — 83735 ASSAY OF MAGNESIUM: CPT

## 2017-12-02 PROCEDURE — 99223 1ST HOSP IP/OBS HIGH 75: CPT | Mod: ,,, | Performed by: PHYSICIAN ASSISTANT

## 2017-12-02 PROCEDURE — 25000003 PHARM REV CODE 250: Performed by: PHYSICIAN ASSISTANT

## 2017-12-02 PROCEDURE — 84443 ASSAY THYROID STIM HORMONE: CPT

## 2017-12-02 PROCEDURE — 85025 COMPLETE CBC W/AUTO DIFF WBC: CPT

## 2017-12-02 PROCEDURE — 63600175 PHARM REV CODE 636 W HCPCS: Performed by: PHYSICIAN ASSISTANT

## 2017-12-02 RX ORDER — AMLODIPINE BESYLATE 5 MG/1
10 TABLET ORAL DAILY
Status: DISCONTINUED | OUTPATIENT
Start: 2017-12-02 | End: 2017-12-02 | Stop reason: HOSPADM

## 2017-12-02 RX ORDER — DEXTROSE MONOHYDRATE 50 MG/ML
INJECTION, SOLUTION INTRAVENOUS CONTINUOUS
Status: DISCONTINUED | OUTPATIENT
Start: 2017-12-02 | End: 2017-12-02 | Stop reason: HOSPADM

## 2017-12-02 RX ORDER — ACETAMINOPHEN 325 MG/1
650 TABLET ORAL EVERY 8 HOURS PRN
Status: DISCONTINUED | OUTPATIENT
Start: 2017-12-02 | End: 2017-12-02 | Stop reason: SDUPTHER

## 2017-12-02 RX ORDER — CALCITRIOL 0.25 UG/1
0.25 CAPSULE ORAL DAILY
Status: DISCONTINUED | OUTPATIENT
Start: 2017-12-02 | End: 2017-12-02

## 2017-12-02 RX ORDER — NAPROXEN SODIUM 220 MG/1
81 TABLET, FILM COATED ORAL DAILY
Status: DISCONTINUED | OUTPATIENT
Start: 2017-12-02 | End: 2017-12-02 | Stop reason: HOSPADM

## 2017-12-02 RX ORDER — FLUOXETINE HYDROCHLORIDE 20 MG/1
20 CAPSULE ORAL DAILY
Status: DISCONTINUED | OUTPATIENT
Start: 2017-12-02 | End: 2017-12-02 | Stop reason: HOSPADM

## 2017-12-02 RX ORDER — HEPARIN SODIUM 5000 [USP'U]/ML
5000 INJECTION, SOLUTION INTRAVENOUS; SUBCUTANEOUS EVERY 12 HOURS
Status: DISCONTINUED | OUTPATIENT
Start: 2017-12-02 | End: 2017-12-02 | Stop reason: HOSPADM

## 2017-12-02 RX ORDER — ACETAMINOPHEN 325 MG/1
650 TABLET ORAL EVERY 8 HOURS PRN
Status: DISCONTINUED | OUTPATIENT
Start: 2017-12-02 | End: 2017-12-02 | Stop reason: HOSPADM

## 2017-12-02 RX ORDER — CINACALCET 30 MG/1
90 TABLET, FILM COATED ORAL 2 TIMES DAILY WITH MEALS
Status: DISCONTINUED | OUTPATIENT
Start: 2017-12-02 | End: 2017-12-02 | Stop reason: HOSPADM

## 2017-12-02 RX ORDER — SODIUM CHLORIDE 0.9 % (FLUSH) 0.9 %
5 SYRINGE (ML) INJECTION
Status: DISCONTINUED | OUTPATIENT
Start: 2017-12-02 | End: 2017-12-02 | Stop reason: HOSPADM

## 2017-12-02 RX ORDER — QUETIAPINE FUMARATE 25 MG/1
25 TABLET, FILM COATED ORAL NIGHTLY
Status: DISCONTINUED | OUTPATIENT
Start: 2017-12-02 | End: 2017-12-02 | Stop reason: HOSPADM

## 2017-12-02 RX ORDER — DONEPEZIL HYDROCHLORIDE 5 MG/1
10 TABLET, FILM COATED ORAL DAILY
Status: DISCONTINUED | OUTPATIENT
Start: 2017-12-02 | End: 2017-12-02 | Stop reason: HOSPADM

## 2017-12-02 RX ADMIN — DONEPEZIL HYDROCHLORIDE 10 MG: 5 TABLET, FILM COATED ORAL at 10:12

## 2017-12-02 RX ADMIN — ASPIRIN 81 MG CHEWABLE TABLET 81 MG: 81 TABLET CHEWABLE at 10:12

## 2017-12-02 RX ADMIN — CINACALCET HYDROCHLORIDE 90 MG: 30 TABLET, COATED ORAL at 10:12

## 2017-12-02 RX ADMIN — HEPARIN SODIUM 5000 UNITS: 5000 INJECTION, SOLUTION INTRAVENOUS; SUBCUTANEOUS at 10:12

## 2017-12-02 RX ADMIN — DEXTROSE MONOHYDRATE: 5 INJECTION, SOLUTION INTRAVENOUS at 10:12

## 2017-12-02 RX ADMIN — AMLODIPINE BESYLATE 10 MG: 5 TABLET ORAL at 10:12

## 2017-12-02 RX ADMIN — DEXTROSE MONOHYDRATE: 5 INJECTION, SOLUTION INTRAVENOUS at 12:12

## 2017-12-02 RX ADMIN — FLUOXETINE 20 MG: 20 CAPSULE ORAL at 10:12

## 2017-12-02 NOTE — PROGRESS NOTES
[facility] Hospital Medicine  Progress Note    Patient Name: Mary Singleton  MRN: [id number]  Patient Class: IP- Inpatient   Admission Date: 12/1/2017  Length of Stay: 1 days  Attending Physician: Rene Smith MD  Primary Care Provider: Susana Arvizu MD        Subjective:     Principal Problem:Hypernatremia    HPI:  Ms. Mary Singleton is a 89 y.o. female, with PMH of HTN, CKD-IV(follows with [facility], no long on dialysis), CAD, Alzheimer's Dementia, primary hypoparathyroidism (2/2 parathyroid removal), hypothyroidism, who presented to [facility] ED on 12/1/17 2/2 intermittent dehydration. Per ED note, she is currently taking fluid restrictors 2/2 urinary retention. Per ED note daughter denies nausea, vomiting, urinary incontinence, frequency. She lives at home with her daughter and has in-home nursing. Per ED note family desires to initiate Hospice Care, and make the patient a DNR.     The patient was evaluated in the ED and found to be significantly hypernatremic. ED MD & Nephrology did discuss care of the patient, and it was advised she be started on an infusion of D5 in water, which was initiated in the ED and continued upon admission. She did additionally have elevated chloride, and LLOYD on her CKD-IV.     Hospital Course:  No notes on file    Interval History:   Pt new to me.   Somewhat sleepy.        Review of Systems   Constitutional: Negative for fever.   Respiratory: Negative for shortness of breath.    Cardiovascular: Negative for chest pain.   Gastrointestinal: Negative for abdominal pain.   Genitourinary: Negative for dysuria.   Psychiatric/Behavioral: Negative for behavioral problems.     Objective:     Vital Signs (Most Recent):  Temp: 98 °F (36.7 °C) (12/02/17 0200)  Pulse: 86 (12/02/17 0200)  Resp: 17 (12/02/17 0200)  BP: (!) 147/93 (12/02/17 0200)  SpO2: 99 % (12/02/17 0200) Vital Signs (24h Range):  Temp:  [98 °F (36.7 °C)-98.4 °F (36.9 °C)] 98 °F (36.7 °C)  Pulse:   [] 86  Resp:  [17-19] 17  SpO2:  [97 %-100 %] 99 %  BP: (147-184)/(70-93) 147/93     Weight: 56.6 kg (124 lb 12.5 oz)  Body mass index is 22.82 kg/m².  No intake or output data in the 24 hours ending 12/02/17 1223   Physical Exam   Constitutional: She appears well-developed and well-nourished.   HENT:   Head: Normocephalic and atraumatic.   Eyes: Conjunctivae are normal. Pupils are equal, round, and reactive to light.   Neck: Normal range of motion. Neck supple.   Cardiovascular: Regular rhythm and normal heart sounds.    Pulmonary/Chest: Effort normal and breath sounds normal.   Abdominal: Soft. Bowel sounds are normal. There is no tenderness.   Musculoskeletal: She exhibits no edema or tenderness.   Neurological: She is alert.   Skin: Skin is warm and dry.       Significant Labs:   BMP:   Recent Labs  Lab 12/02/17  0456 12/02/17  0853   * 112*   * 167*   K 4.1 4.1   CL >130* 130*   CO2 24 25   * 115*   CREATININE 6.3* 6.0*   CALCIUM 9.1 8.8   MG 2.6  --      CBC:   Recent Labs  Lab 12/01/17  2114 12/02/17  0456   WBC 7.77 7.34   HGB 10.8* 10.1*   HCT 35.6* 33.7*    210       Significant Imaging: I have reviewed all pertinent imaging results/findings within the past 24 hours.   Imaging Results    None       Assessment/Plan:      * Hypernatremia    - Per ED MD [facility]: will maintain patient on D5 water   - Nephrology consulted  - Monitor sodium q 6 hours.            LLOYD with CKD-IV    - Creatinine worsened from baseline, currently 6.6  - IV fluids ordered   - follows with Dr. Arvizu   - has required temporary dialysis in the past, not currently on dialysis  - at last admission had intact AV fistula   - avoid nephrotoxins/NSAIDs/ACEi/ARB  - renally dose medications     - Social work for inpatient hospice. Family in agreement.             Hypercalcemia    - hold calcitriol  - Nephrology consulted           Heart failure, diastolic, chronic    - last EF was 70% on 4/20/17  - monitor  for fluid overload   - holding lasix 2/2 LLOYD at this time           Dementia    - continue aricept 10 mg QD   - delirium precautions           Hypothyroidism    - continue synthroid 150 mcg QD   - TSH = 0.688        Carotid artery stenosis    - continue daily 81 mg ASA           Hyperparathyroidism              Hypertension    - appears well controlled overall  - continue home meds: amlodipine 10 mg QD          VTE Risk Mitigation         Ordered     heparin (porcine) injection 5,000 Units  Every 12 hours     Route:  Subcutaneous        12/02/17 0008     Medium Risk of VTE  Once      12/02/17 0008     Place BARBARA hose  Until discontinued      12/02/17 0008     Place sequential compression device  Until discontinued      12/02/17 0008              Rene Smith MD  Department of Hospital Medicine   [facility]

## 2017-12-02 NOTE — ASSESSMENT & PLAN NOTE
- Creatinine worsened from baseline, currently 6.6  - IV fluids ordered   - follows with Dr. Arvizu   - has required temporary dialysis in the past, not currently on dialysis  - at last admission had intact AV fistula   - avoid nephrotoxins/NSAIDs/ACEi/ARB  - renally dose medications

## 2017-12-02 NOTE — ASSESSMENT & PLAN NOTE
- Per ED MD d/w [facility]: will maintain patient on D5 water   - Nephrology consult ordered for AM

## 2017-12-02 NOTE — SUBJECTIVE & OBJECTIVE
Interval History:   Pt new to me.   Somewhat sleepy.        Review of Systems   Constitutional: Negative for fever.   Respiratory: Negative for shortness of breath.    Cardiovascular: Negative for chest pain.   Gastrointestinal: Negative for abdominal pain.   Genitourinary: Negative for dysuria.   Psychiatric/Behavioral: Negative for behavioral problems.     Objective:     Vital Signs (Most Recent):  Temp: 98 °F (36.7 °C) (12/02/17 0200)  Pulse: 86 (12/02/17 0200)  Resp: 17 (12/02/17 0200)  BP: (!) 147/93 (12/02/17 0200)  SpO2: 99 % (12/02/17 0200) Vital Signs (24h Range):  Temp:  [98 °F (36.7 °C)-98.4 °F (36.9 °C)] 98 °F (36.7 °C)  Pulse:  [] 86  Resp:  [17-19] 17  SpO2:  [97 %-100 %] 99 %  BP: (147-184)/(70-93) 147/93     Weight: 56.6 kg (124 lb 12.5 oz)  Body mass index is 22.82 kg/m².  No intake or output data in the 24 hours ending 12/02/17 1223   Physical Exam   Constitutional: She appears well-developed and well-nourished.   HENT:   Head: Normocephalic and atraumatic.   Eyes: Conjunctivae are normal. Pupils are equal, round, and reactive to light.   Neck: Normal range of motion. Neck supple.   Cardiovascular: Regular rhythm and normal heart sounds.    Pulmonary/Chest: Effort normal and breath sounds normal.   Abdominal: Soft. Bowel sounds are normal. There is no tenderness.   Musculoskeletal: She exhibits no edema or tenderness.   Neurological: She is alert.   Skin: Skin is warm and dry.       Significant Labs:   BMP:   Recent Labs  Lab 12/02/17  0456 12/02/17  0853   * 112*   * 167*   K 4.1 4.1   CL >130* 130*   CO2 24 25   * 115*   CREATININE 6.3* 6.0*   CALCIUM 9.1 8.8   MG 2.6  --      CBC:   Recent Labs  Lab 12/01/17  2114 12/02/17  0456   WBC 7.77 7.34   HGB 10.8* 10.1*   HCT 35.6* 33.7*    210       Significant Imaging: I have reviewed all pertinent imaging results/findings within the past 24 hours.   Imaging Results    None

## 2017-12-02 NOTE — ASSESSMENT & PLAN NOTE
- Per ED MD d/w [facility]: will maintain patient on D5 water   - Nephrology was consulted  - Got D5 IVF and sodium went from 171 to 165.    - To inpatient hospice.

## 2017-12-02 NOTE — ED PROVIDER NOTES
Encounter Date: 12/1/2017    SCRIBE #1 NOTE: I, Cliffpatricia Summer, am scribing for, and in the presence of, Dr. Butcher .       History     Chief Complaint   Patient presents with    Dehydration     Went to see Dr. Arvizu who alerted family to come to ED due to dehydration and renal failure of single working kidney.  Has a fistula upper right arm but has not been able to get dialysis for months.      Time seen by provider: 8:53 PM    This is a 89 y.o. Female, with a Hx of osteoporosis, renal failure and kidney failure, who is brought in by daughter given concern for  Dehydration and abnormal labs. Daughter reports positive response to appetite stimulants and is currently taking fluid restrictors due to retention. Daughter reports concern for possible high toxin levels on blood draw as expressed by Dr Arvizu,but denies nausea, vomiting, urinary incontinence,or frequency. Daughter reports that the patient is enrolled in home hospice.          The history is provided by a relative (daughter ).     Review of patient's allergies indicates:   Allergen Reactions    Penicillins      Doesn't remember it has been years     Past Medical History:   Diagnosis Date    CKD (chronic kidney disease), stage IV     Dementia 11/2/2016    2012: Diagnosed with Alzheimer's Dementia.    HPTH (hyperparathyroidism)     Hypertension     Osteoporosis      Past Surgical History:   Procedure Laterality Date    parathyroid removal       Family History   Problem Relation Age of Onset    Hypertension Father     Cancer Sister     Hearing loss Sister      Social History   Substance Use Topics    Smoking status: Never Smoker    Smokeless tobacco: Never Used    Alcohol use No     Review of Systems   Constitutional: Negative for fever.        Dehydration.    HENT: Negative for sore throat.    Respiratory: Negative for shortness of breath.    Cardiovascular: Negative for chest pain.   Gastrointestinal: Negative for diarrhea, nausea, rectal pain and  vomiting.   Genitourinary: Negative for difficulty urinating, dyspareunia, dysuria and frequency.   Musculoskeletal: Negative for back pain.   Skin: Negative for rash.   Neurological: Negative for weakness.   Hematological: Does not bruise/bleed easily.       Physical Exam     Initial Vitals [12/01/17 1916]   BP Pulse Resp Temp SpO2   (!) 184/80 (!) 111 17 98.4 °F (36.9 °C) 98 %      MAP       114.67         Physical Exam    Nursing note and vitals reviewed.  Constitutional: She appears well-developed and well-nourished. She is cooperative.  Non-toxic appearance. No distress.   HENT:   Head: Normocephalic and atraumatic.   Mouth/Throat: Oropharynx is clear and moist.   Dry mucous membranes.    Eyes: Conjunctivae and EOM are normal. Pupils are equal, round, and reactive to light.   Neck: Normal range of motion and full passive range of motion without pain. Neck supple. No thyromegaly present. No JVD present.   Cardiovascular: Normal rate, regular rhythm, normal heart sounds and normal pulses. Exam reveals no gallop and no friction rub.    No murmur heard.  Pulmonary/Chest: Effort normal and breath sounds normal. No respiratory distress. She has no wheezes. She has no rhonchi. She has no rales. She exhibits no tenderness.   Abdominal: Soft. Normal appearance and bowel sounds are normal. She exhibits no distension and no mass. There is no tenderness.   Musculoskeletal: Normal range of motion.   Diffuse ecchymosis to all four extremities.    Neurological: She has normal strength. No cranial nerve deficit or sensory deficit.   Answers simple questions only. Responds to name.    Skin: Skin is warm, dry and intact. No rash noted.   Psychiatric: She has a normal mood and affect. Her speech is normal and behavior is normal. Judgment and thought content normal.         ED Course   Procedures  Labs Reviewed   CBC W/ AUTO DIFFERENTIAL - Abnormal; Notable for the following:        Result Value    RBC 3.53 (*)     Hemoglobin 10.8  (*)     Hematocrit 35.6 (*)      (*)     MCHC 30.3 (*)     RDW 14.7 (*)     Gran% 74.0 (*)     All other components within normal limits   COMPREHENSIVE METABOLIC PANEL - Abnormal; Notable for the following:     Sodium 171 (*)     Chloride >130 (*)     BUN, Bld 128 (*)     Creatinine 6.6 (*)     Albumin 3.4 (*)     eGFR if  6 (*)     eGFR if non  5 (*)     All other components within normal limits    Narrative:        critical result(s) called and verbal readback obtained from   ISAIAS HALL RN, 12/01/2017 21:51   MAGNESIUM - Abnormal; Notable for the following:     Magnesium 2.7 (*)     All other components within normal limits   URINALYSIS - Abnormal; Notable for the following:     Appearance, UA Hazy (*)     Protein, UA 2+ (*)     Occult Blood UA Trace (*)     Nitrite, UA Positive (*)     All other components within normal limits   URINALYSIS MICROSCOPIC - Abnormal; Notable for the following:     Bacteria, UA Many (*)     All other components within normal limits   LIPASE   POCT GLUCOSE MONITORING CONTINUOUS     EKG Readings: (Independently Interpreted)   Initial Reading: No STEMI. Rhythm: Sinus Tachycardia. Conduction: RBBB.   Irregularly irregular sinus rhythm with a rate of 113 bpm. Unchanged compared to 9/21/17.          Medical Decision Making:   Initial Assessment:     Urgent evaluation of 89-year-old female sent by Dr. Arvizu from nephrology with concern for hypernatremia.  Patient has dementia, stage IV CK D- with decision to not pursue dialysis, and hypertension cared for primarily by her daughter who notes progressive decline over last week with increased irritability. Family members, can no longer care for the patient at home and are interested in pursuing hospice and comfort care measures only. On exam pt answers simple questions only, appears dehydrated, tachycardic, diffuse scattered ecchymosis to B UE/LE with broad ddx including progressive deterioration from  comorbid conditions, kidney failure, metabolic disturbance, dehydration, uti. Will plan to hydrate and reassess. Both Arvizu and Omar called with recommendation for admission as needed.   Differential Diagnosis:   Labs notable for impressive hypernatremia, elevated BUN/Cr 128/6.6  Clinical Tests:   Lab Tests: Ordered and Reviewed  Medical Tests: Ordered and Reviewed            Scribe Attestation:   Scribe #1: I performed the above scribed service and the documentation accurately describes the services I performed. I attest to the accuracy of the note.    Attending Attestation:           Physician Attestation for Scribe:  Physician Attestation Statement for Scribe #1: I, Dr. Butcher, reviewed documentation, as scribed by Dallas Wheeler  in my presence, and it is both accurate and complete.                 ED Course      Clinical Impression:     1. Hypernatremia    2. Renal insufficiency    3. Hyperchloremia        Disposition:   Disposition: Admitted  Condition: Serious                        Lyla Butcher MD  12/02/17 0652

## 2017-12-02 NOTE — SUBJECTIVE & OBJECTIVE
Past Medical History:   Diagnosis Date    CKD (chronic kidney disease), stage IV     Dementia 11/2/2016    2012: Diagnosed with Alzheimer's Dementia.    HPTH (hyperparathyroidism)     Hypertension     Osteoporosis        Past Surgical History:   Procedure Laterality Date    parathyroid removal         Review of patient's allergies indicates:   Allergen Reactions    Penicillins      Doesn't remember it has been years       No current facility-administered medications on file prior to encounter.      Current Outpatient Prescriptions on File Prior to Encounter   Medication Sig    amlodipine (NORVASC) 10 MG tablet Take 10 mg by mouth once daily.    calcitRIOL (ROCALTROL) 0.25 MCG Cap Take 0.25 mcg by mouth once daily.    cetirizine (ZYRTEC) 5 MG tablet Take 5 mg by mouth once daily.    cinacalcet (SENSIPAR) 60 MG Tab Take 1 tablet (60 mg total) by mouth 2 (two) times daily with meals. (Patient taking differently: Take 90 mg by mouth 2 (two) times daily with meals. )    donepezil (ARICEPT) 10 MG tablet Take 10 mg by mouth once daily.     fluoxetine (PROZAC) 20 MG capsule Take 20 mg by mouth once daily.    furosemide (LASIX) 40 MG tablet Take 40 mg by mouth 2 (two) times daily.    levothyroxine (SYNTHROID) 150 MCG tablet Take 150 mcg by mouth once daily.    megestrol (MEGACE) 40 MG Tab Take 40 mg by mouth once daily.    quetiapine (SEROQUEL) 25 MG Tab Take 25 mg by mouth every evening.     ranitidine (ZANTAC) 150 MG capsule Take 150 mg by mouth 2 (two) times daily.    DENOSUMAB (PROLIA SUBQ) Inject into the skin every 6 (six) months.     polyethylene glycol (GLYCOLAX) 17 gram PwPk Take 17 g by mouth once daily.     Family History     Problem Relation (Age of Onset)    Cancer Sister    Hearing loss Sister    Hypertension Father        Social History Main Topics    Smoking status: Never Smoker    Smokeless tobacco: Never Used    Alcohol use No    Drug use: No    Sexual activity: No     Review of  Systems   Unable to perform ROS: Dementia     Objective:     Vital Signs (Most Recent):  Temp: 98 °F (36.7 °C) (12/02/17 0200)  Pulse: 86 (12/02/17 0200)  Resp: 17 (12/02/17 0200)  BP: (!) 147/93 (12/02/17 0200)  SpO2: 99 % (12/02/17 0200) Vital Signs (24h Range):  Temp:  [98 °F (36.7 °C)-98.4 °F (36.9 °C)] 98 °F (36.7 °C)  Pulse:  [] 86  Resp:  [17-19] 17  SpO2:  [97 %-100 %] 99 %  BP: (147-184)/(70-93) 147/93     Weight: 56.6 kg (124 lb 12.5 oz)  Body mass index is 22.82 kg/m².    Physical Exam   Constitutional: She appears well-developed and well-nourished. No distress.   HENT:   Head: Normocephalic and atraumatic.   Mucous membranes were dry.    Eyes: Conjunctivae and EOM are normal. Pupils are equal, round, and reactive to light. No scleral icterus.   Neck: Normal range of motion. Neck supple. No JVD present. No tracheal deviation present.   Cardiovascular: Normal rate, regular rhythm, normal heart sounds and intact distal pulses.  Exam reveals no gallop and no friction rub.    No murmur heard.  Pulmonary/Chest: Effort normal and breath sounds normal. No stridor. No respiratory distress. She has no wheezes. She has no rales.   Abdominal: Soft. Bowel sounds are normal. She exhibits no distension and no mass. There is no tenderness. There is no rebound and no guarding. No hernia.   Musculoskeletal: Normal range of motion. She exhibits no deformity.   Neurological: She is alert. No cranial nerve deficit. She exhibits normal muscle tone.   Oriented to person only, responds to name.   The patient was arousable and cooperative. AAO to person only. CN II-XII were grossly intact. The patient had reduced muscle bulk and tone with equal strength throughout. Gait not assessed. Sensation was intact to light touch.     Skin: Skin is warm and dry. No rash noted. She is not diaphoretic. No erythema. No pallor.   Ecchymosis of b/l distal upper extremities.   Psychiatric: She has a normal mood and affect. Her behavior is  normal. Judgment and thought content normal.   Nursing note and vitals reviewed.        CRANIAL NERVES     CN III, IV, VI   Pupils are equal, round, and reactive to light.  Extraocular motions are normal.        Significant Labs:   BMP:   Recent Labs  Lab 12/01/17 2114      *   K 4.5   CL >130*   CO2 25   *   CREATININE 6.6*   CALCIUM 9.6   MG 2.7*     CBC:   Recent Labs  Lab 12/01/17 2114   WBC 7.77   HGB 10.8*   HCT 35.6*        CMP:   Recent Labs  Lab 12/01/17 2114   *   K 4.5   CL >130*   CO2 25      *   CREATININE 6.6*   CALCIUM 9.6   PROT 7.1   ALBUMIN 3.4*   BILITOT 0.5   ALKPHOS 74   AST 19   ALT 20   ANIONGAP Unable to calculate   EGFRNONAA 5*     All pertinent labs within the past 24 hours have been reviewed.    Significant Imaging: I have reviewed all pertinent imaging results/findings within the past 24 hours.   Imaging Results    None

## 2017-12-02 NOTE — ASSESSMENT & PLAN NOTE
- Creatinine worsened from baseline, currently 6.6  - IV fluids ordered   - follows with Dr. Arvizu   - has required temporary dialysis in the past, not currently on dialysis  - at last admission had intact AV fistula   - avoid nephrotoxins/NSAIDs/ACEi/ARB  - renally dose medications     - Social work for inpatient hospice. Family in agreement.

## 2017-12-02 NOTE — HPI
Ms. Mary Singleton is a 89 y.o. female, with PMH of HTN, CKD-IV(follows with [facility], no long on dialysis), CAD, Alzheimer's Dementia, primary hypoparathyroidism (2/2 parathyroid removal), hypothyroidism, who presented to [facility] ED on 12/1/17 2/2 intermittent dehydration. Per ED note, she is currently taking fluid restrictors 2/2 urinary retention. Per ED note daughter denies nausea, vomiting, urinary incontinence, frequency. She lives at home with her daughter and has in-home nursing. Per ED note family desires to initiate Hospice Care, and make the patient a DNR.     The patient was evaluated in the ED and found to be significantly hypernatremic. ED MD & Nephrology did discuss care of the patient, and it was advised she be started on an infusion of D5 in water, which was initiated in the ED and continued upon admission. She did additionally have elevated chloride, and LLOYD on her CKD-IV.

## 2017-12-02 NOTE — HOSPITAL COURSE
89 yo female with multiple medical problems including CKD5, advanced dementia.  Sodium 171 at presentation.  Received D5 IVF and sodium down to 165.  Stopping as family in agreement and patient will go to [facility].  Patient had no complaints, and was drowsy but responsive here.   Given age and multiple comorbidities, patient will go to inpatient hospice which is felt appropriate.  Family at bedside in agreement.     Arrangements for transfer being made.

## 2017-12-02 NOTE — PLAN OF CARE
Discharge planner notified [facility] of inpatient hospice referral, awaiting call back from [facility]. Sent hospice referral via [facility] and added to care team Leslee    1349- [facility] w/ [facility] is on the way to the facility to meet with patient family. VIRAL Camilo     1427- [facility] w/ [facility] is speaking with patient family.    Discharge planner sent hospice orders to [facility]. Confirmation received VIRAL Camilo    1531- Per [facility]/ [facility], met with patient's family and all necessary paper work signed. Patient nurse report can be called to Louisa in intake @[phone]. Updated patient nurse Emilia    1530- Notified bogdan Fonseca w/ [facility]. Arranged for transport to [facility] inpatient hospice ETA: 1 hour 1645  Updated patient family VIRAL Camilo

## 2017-12-02 NOTE — PROGRESS NOTES
"  [facility]  Adult Nutrition  Consult Note    SUMMARY     Recommendations    Recommendation/Intervention:   1. Rec novasource BID.   2. Encourage PO intake.   3. RD to follow.   Goals: >85% of EEN, EPN  Nutrition Goal Status: new    Reason for Assessment    Reason for Assessment: physician consult  Diagnosis: other (see comments), renal disease (hypernatremia)  Relevent Medical History: CKD, Osteoporosis, HTN, HPTH, Alzheimers   Interdisciplinary Rounds: did not attend  General Information Comments: Pt family seeking hospice care. Pt was experiencing dehydration and renal failure of single working kidney when admited to ED. Pt has not been getting dialysis for months.   Nutrition Discharge Planning: DC on 2gm NA diet with texture per SLP.    Nutrition Prescription Ordered    Current Diet Order: 2gm Na     Evaluation of Received Nutrients/Fluid Intake    % Intake of Estimated Energy Needs: 0 - 25 %  % Meal Intake: 0%     Nutrition/Diet History    Pt reported renal diet at home.     Labs/Tests/Procedures/Meds    Pertinent Labs Reviewed: reviewed  Pertinent Labs Comments: Na 169, Chloride >130, , crt 6.3, glucose 131, phos 5.3  Pertinent Medications Reviewed: reviewed  Pertinent Medications Comments: aspirin, heparin    Physical Findings    Overall Physical Appearance: lethargic  Skin: intact, other (see comments) (bruising present)    Anthropometrics    Height: 5' 2.01" (157.5 cm)  Weight Method: Bed Scale  Weight: 56.6 kg (124 lb 12.5 oz)  Ideal Body Weight (IBW), Female: 110.05 lb  % Ideal Body Weight, Female (lb): 113.38 lb  BMI (Calculated): 22.9  BMI Grade: 18.5-24.9 - normal     Estimated/Assessed Needs    Weight Used For Calorie Calculations: 56.6 kg (124 lb 12.5 oz)   Height (cm): 157.5 cm  Energy Calorie Requirements (kcal): 1415kcal/day  Energy Need Method: Kcal/kg  RMR ([place]-St. Jeor Equation): 939.38  Weight Used For Protein Calculations: 56.6 kg (124 lb 12.5 oz)  Protein " Requirements: 56-68g/day  Fluid Requirements (mL): 1415ml/day  Fluid Need Method: RDA Method (or per MD)  RDA Method (mL): 1415     Assessment and Plan    Nutrition Problem:  Altered nutrition laboratory values    Related to (etiology):   Renal dysfunction    Signs and Symptoms (as evidenced by):   Elevated BUN, Crt    Interventions/Recommendations (treatment strategy):  See recs    Nutrition Diagnosis Status:   New    Monitor and Evaluation    Food and Nutrient Intake: food and beverage intake  Physical Activity and Function: nutrition-related ADLs and IADLs  Anthropometric Measurements: weight, weight change  Biochemical Data, Medical Tests and Procedures: electrolyte and renal panel, gastrointestinal profile, glucose/endocrine profile, inflammatory profile, lipid profile  Nutrition-Focused Physical Findings: overall appearance    Nutrition Risk    Level of Risk:  (1x / week )    Nutrition Follow-Up    RD Follow-up?: Yes

## 2017-12-02 NOTE — ASSESSMENT & PLAN NOTE
- Per ED MD d/w [facility]: will maintain patient on D5 water   - Nephrology consulted  - Monitor sodium q 6 hours.

## 2017-12-02 NOTE — ED NOTES
Pt lying down, respirations even/unlabored. NAD noted. Pt status unchanged. Side rails up x2, call bell within reach. Will continue to monitor.

## 2017-12-02 NOTE — H&P
[facility] Hospital Medicine  History & Physical    Patient Name: Mary Singleton  MRN: [id number]  Admission Date: 12/1/2017  Attending Physician: Rene Smith MD   Primary Care Provider: Susana Arvizu MD         Patient information was obtained from patient, past medical records and ER records.     Subjective:     Principal Problem:Hypernatremia    Chief Complaint:   Chief Complaint   Patient presents with    Dehydration     Went to see Dr. Arvizu who alerted family to come to ED due to dehydration and renal failure of single working kidney.  Has a fistula upper right arm but has not been able to get dialysis for months.         HPI: Ms. Mary Singleton is a 89 y.o. female, with PMH of HTN, CKD-IV(follows with [facility], no long on dialysis), CAD, Alzheimer's Dementia, primary hypoparathyroidism (2/2 parathyroid removal), hypothyroidism, who presented to [facility] ED on 12/1/17 2/2 intermittent dehydration. Per ED note, she is currently taking fluid restrictors 2/2 urinary retention. Per ED note daughter denies nausea, vomiting, urinary incontinence, frequency. She lives at home with her daughter and has in-home nursing. Per ED note family desires to initiate Hospice Care, and make the patient a DNR.     The patient was evaluated in the ED and found to be significantly hypernatremic. ED MD & Nephrology did discuss care of the patient, and it was advised she be started on an infusion of D5 in water, which was initiated in the ED and continued upon admission. She did additionally have elevated chloride, and LLOYD on her CKD-IV.     Past Medical History:   Diagnosis Date    CKD (chronic kidney disease), stage IV     Dementia 11/2/2016    2012: Diagnosed with Alzheimer's Dementia.    HPTH (hyperparathyroidism)     Hypertension     Osteoporosis        Past Surgical History:   Procedure Laterality Date    parathyroid removal         Review of patient's allergies indicates:   Allergen  Reactions    Penicillins      Doesn't remember it has been years       No current facility-administered medications on file prior to encounter.      Current Outpatient Prescriptions on File Prior to Encounter   Medication Sig    amlodipine (NORVASC) 10 MG tablet Take 10 mg by mouth once daily.    calcitRIOL (ROCALTROL) 0.25 MCG Cap Take 0.25 mcg by mouth once daily.    cetirizine (ZYRTEC) 5 MG tablet Take 5 mg by mouth once daily.    cinacalcet (SENSIPAR) 60 MG Tab Take 1 tablet (60 mg total) by mouth 2 (two) times daily with meals. (Patient taking differently: Take 90 mg by mouth 2 (two) times daily with meals. )    donepezil (ARICEPT) 10 MG tablet Take 10 mg by mouth once daily.     fluoxetine (PROZAC) 20 MG capsule Take 20 mg by mouth once daily.    furosemide (LASIX) 40 MG tablet Take 40 mg by mouth 2 (two) times daily.    levothyroxine (SYNTHROID) 150 MCG tablet Take 150 mcg by mouth once daily.    megestrol (MEGACE) 40 MG Tab Take 40 mg by mouth once daily.    quetiapine (SEROQUEL) 25 MG Tab Take 25 mg by mouth every evening.     ranitidine (ZANTAC) 150 MG capsule Take 150 mg by mouth 2 (two) times daily.    DENOSUMAB (PROLIA SUBQ) Inject into the skin every 6 (six) months.     polyethylene glycol (GLYCOLAX) 17 gram PwPk Take 17 g by mouth once daily.     Family History     Problem Relation (Age of Onset)    Cancer Sister    Hearing loss Sister    Hypertension Father        Social History Main Topics    Smoking status: Never Smoker    Smokeless tobacco: Never Used    Alcohol use No    Drug use: No    Sexual activity: No     Review of Systems   Unable to perform ROS: Dementia     Objective:     Vital Signs (Most Recent):  Temp: 98 °F (36.7 °C) (12/02/17 0200)  Pulse: 86 (12/02/17 0200)  Resp: 17 (12/02/17 0200)  BP: (!) 147/93 (12/02/17 0200)  SpO2: 99 % (12/02/17 0200) Vital Signs (24h Range):  Temp:  [98 °F (36.7 °C)-98.4 °F (36.9 °C)] 98 °F (36.7 °C)  Pulse:  [] 86  Resp:  [17-19]  17  SpO2:  [97 %-100 %] 99 %  BP: (147-184)/(70-93) 147/93     Weight: 56.6 kg (124 lb 12.5 oz)  Body mass index is 22.82 kg/m².    Physical Exam   Constitutional: She appears well-developed and well-nourished. No distress.   HENT:   Head: Normocephalic and atraumatic.   Mucous membranes were dry.    Eyes: Conjunctivae and EOM are normal. Pupils are equal, round, and reactive to light. No scleral icterus.   Neck: Normal range of motion. Neck supple. No JVD present. No tracheal deviation present.   Cardiovascular: Normal rate, regular rhythm, normal heart sounds and intact distal pulses.  Exam reveals no gallop and no friction rub.    No murmur heard.  Pulmonary/Chest: Effort normal and breath sounds normal. No stridor. No respiratory distress. She has no wheezes. She has no rales.   Abdominal: Soft. Bowel sounds are normal. She exhibits no distension and no mass. There is no tenderness. There is no rebound and no guarding. No hernia.   Musculoskeletal: Normal range of motion. She exhibits no deformity.   Neurological: She is alert. No cranial nerve deficit. She exhibits normal muscle tone.   Oriented to person only, responds to name.   The patient was arousable and cooperative. AAO to person only. CN II-XII were grossly intact. The patient had reduced muscle bulk and tone with equal strength throughout. Gait not assessed. Sensation was intact to light touch.     Skin: Skin is warm and dry. No rash noted. She is not diaphoretic. No erythema. No pallor.   Ecchymosis of b/l distal upper extremities.   Psychiatric: She has a normal mood and affect. Her behavior is normal. Judgment and thought content normal.   Nursing note and vitals reviewed.        CRANIAL NERVES     CN III, IV, VI   Pupils are equal, round, and reactive to light.  Extraocular motions are normal.        Significant Labs:   BMP:   Recent Labs  Lab 12/01/17 2114      *   K 4.5   CL >130*   CO2 25   *   CREATININE 6.6*   CALCIUM 9.6    MG 2.7*     CBC:   Recent Labs  Lab 12/01/17 2114   WBC 7.77   HGB 10.8*   HCT 35.6*        CMP:   Recent Labs  Lab 12/01/17 2114   *   K 4.5   CL >130*   CO2 25      *   CREATININE 6.6*   CALCIUM 9.6   PROT 7.1   ALBUMIN 3.4*   BILITOT 0.5   ALKPHOS 74   AST 19   ALT 20   ANIONGAP Unable to calculate   EGFRNONAA 5*     All pertinent labs within the past 24 hours have been reviewed.    Significant Imaging: I have reviewed all pertinent imaging results/findings within the past 24 hours.   Imaging Results    None          Assessment/Plan:     * Hypernatremia    - Per ED MD d/w [facility]: will maintain patient on D5 water   - Nephrology consult ordered for AM           LLOYD with CKD-IV    - Creatinine worsened from baseline, currently 6.6  - IV fluids ordered   - follows with Dr. Arvizu   - has required temporary dialysis in the past, not currently on dialysis  - at last admission had intact AV fistula   - avoid nephrotoxins/NSAIDs/ACEi/ARB  - renally dose medications             Hypercalcemia    - hold calcitriol  - Nephrology consulted           Heart failure, diastolic, chronic    - last EF was 70% on 4/20/17  - monitor for fluid overload   - holding lasix 2/2 LLOYD at this time           Hypertension    - appears well controlled overall  - continue home meds: amlodipine 10 mg QD        Carotid artery stenosis    - continue daily 81 mg ASA           Dementia    - continue aricept 10 mg QD   - delirium precautions           Hypothyroidism    - continue synthroid 150 mcg QD   - TSH pending             VTE Risk Mitigation         Ordered     heparin (porcine) injection 5,000 Units  Every 12 hours     Route:  Subcutaneous        12/02/17 0008     Medium Risk of VTE  Once      12/02/17 0008     Place BARBARA hose  Until discontinued      12/02/17 0008     Place sequential compression device  Until discontinued      12/02/17 0008             HANH PedersenC  Department of Hospital Medicine    [facility]

## 2017-12-02 NOTE — ED TRIAGE NOTES
"Daughter at bedside stating "  My mom has been out of it for the past 3 weeks. We went to Dr. Arvizu 3 weeks ago and he said her toxins levels were high. This week has been really different, she has been non-responsive, mumbling. Dr. Arvizu called today and told her to come in. Her sodium levels were really high".   Last dyalisis in February 2017.   "

## 2017-12-02 NOTE — ASSESSMENT & PLAN NOTE
- last EF was 70% on 4/20/17  - monitor for fluid overload   - holding lasix 2/2 LLOYD at this time

## 2017-12-02 NOTE — DISCHARGE SUMMARY
[facility] Hospital Medicine  Discharge Summary      Patient Name: Mary Singleton  MRN: [id number]  Admission Date: 12/1/2017  Hospital Length of Stay: 1 days  Discharge Date and Time:  12/02/2017 2:25 PM  Attending Physician: Rene Smith MD   Discharging Provider: Rene Smith MD  Primary Care Provider: Susana Arvizu MD      HPI:   Ms. Mary Singleton is a 89 y.o. female, with PMH of HTN, CKD-IV(follows with [facility], no long on dialysis), CAD, Alzheimer's Dementia, primary hypoparathyroidism (2/2 parathyroid removal), hypothyroidism, who presented to [facility] ED on 12/1/17 2/2 intermittent dehydration. Per ED note, she is currently taking fluid restrictors 2/2 urinary retention. Per ED note daughter denies nausea, vomiting, urinary incontinence, frequency. She lives at home with her daughter and has in-home nursing. Per ED note family desires to initiate Hospice Care, and make the patient a DNR.     The patient was evaluated in the ED and found to be significantly hypernatremic. ED MD & Nephrology did discuss care of the patient, and it was advised she be started on an infusion of D5 in water, which was initiated in the ED and continued upon admission. She did additionally have elevated chloride, and LLOYD on her CKD-IV.     * No surgery found *      Hospital Course:   89 yo female with multiple medical problems including CKD5, advanced dementia.  Sodium 171 at presentation.  Received D5 IVF and sodium down to 165.  Stopping as family in agreement and patient will go to [facility].  Patient had no complaints, and was drowsy but responsive here.   Given age and multiple comorbidities, patient will go to inpatient hospice which is felt appropriate.  Family at bedside in agreement.     Arrangements for transfer being made.      Consults:   Consults         Status Ordering Provider     Inpatient consult to Nephrology-[place]  Once     Provider:  Dereck Montanez NP     Acknowledged BRUCE CARROLL     Inpatient consult to Social Work  Once     Provider:  (Not yet assigned)    Acknowledged BRUCE CARROLL     Inpatient consult to Social Work  Once     Provider:  (Not yet assigned)    Acknowledged FARHAN MALDONADO     IP consult to dietary  Once     Provider:  (Not yet assigned)    Acknowledged CLARA GANDARA          * Hypernatremia    - Per ED MD d/w [facility]: will maintain patient on D5 water   - Nephrology was consulted  - Got D5 IVF and sodium went from 171 to 165.    - To inpatient hospice.             LLOYD with CKD-IV    - Creatinine worsened from baseline, currently 6.6  - IV fluids ordered   - follows with Dr. Arvizu   - has required temporary dialysis in the past, not currently on dialysis  - at last admission had intact AV fistula   - avoid nephrotoxins/NSAIDs/ACEi/ARB  - renally dose medications     - Social work for inpatient hospice. Family in agreement.             Hypercalcemia    - hold calcitriol          Heart failure, diastolic, chronic    - last EF was 70% on 4/20/17  - monitor for fluid overload   - holding lasix 2/2 LLOYD at this time           Dementia    - continue aricept 10 mg QD   - delirium precautions           Hypothyroidism    - continue synthroid 150 mcg QD   - TSH = 0.688        Carotid artery stenosis    - continue daily 81 mg ASA           Hyperparathyroidism              Hypertension    - appears well controlled overall  - continue home meds: amlodipine 10 mg QD          Final Active Diagnoses:    Diagnosis Date Noted POA    PRINCIPAL PROBLEM:  Hypernatremia [E87.0] 12/01/2017 Yes    LLOYD with CKD-IV [N17.9] 04/18/2017 Yes    Hypercalcemia [E83.52] 03/06/2017 Yes    Heart failure, diastolic, chronic [I50.32] 01/30/2017 Yes    Hypothyroidism [E03.9] 11/02/2016 Yes    Dementia [F03.90] 11/02/2016 Yes    Carotid artery stenosis [I65.29] 08/18/2015 Yes    Hypertension [I10] 07/25/2013 Yes    Hyperparathyroidism [E21.3] 07/25/2013 Yes       Problems Resolved During this Admission:    Diagnosis Date Noted Date Resolved POA       Discharged Condition: poor    Disposition: Hospice/Medical Facility    Follow Up:  Follow-up Information     [facility].    Specialties:  Hospice and Palliative Medicine, Hospice Services  Contact information:  [facility] [address] [phone]                 Patient Instructions:     Diet general     Activity as tolerated     Call MD for:  increased confusion or weakness     Call MD for:  persistent dizziness, light-headedness, or visual disturbances     Call MD for:  worsening rash     Call MD for:  severe persistent headache     Call MD for:  difficulty breathing or increased cough     Call MD for:  severe uncontrolled pain     Call MD for:  persistent nausea and vomiting or diarrhea     Call MD for:  temperature >100.4         Significant Diagnostic Studies:   Imaging Results    None         Pending Diagnostic Studies:     Procedure Component Value Units Date/Time    Basic Metabolic Panel (BMP) [[id number]]     Order Status:  Sent Lab Status:  No result     Specimen:  Blood from Blood          Medications:  Reconciled Home Medications:   Current Discharge Medication List      CONTINUE these medications which have NOT CHANGED    Details   amlodipine (NORVASC) 10 MG tablet Take 10 mg by mouth once daily.      aspirin 81 MG Chew Take 81 mg by mouth once daily.      calcitRIOL (ROCALTROL) 0.25 MCG Cap Take 0.25 mcg by mouth once daily.      cetirizine (ZYRTEC) 5 MG tablet Take 5 mg by mouth once daily.      cinacalcet (SENSIPAR) 60 MG Tab Take 1 tablet (60 mg total) by mouth 2 (two) times daily with meals.      donepezil (ARICEPT) 10 MG tablet Take 10 mg by mouth once daily.       fluoxetine (PROZAC) 20 MG capsule Take 20 mg by mouth once daily.      levothyroxine (SYNTHROID) 150 MCG tablet Take 150 mcg by mouth once daily.      megestrol (MEGACE) 40 MG Tab Take 40 mg by mouth once daily.      quetiapine  (SEROQUEL) 25 MG Tab Take 25 mg by mouth every evening.       ranitidine (ZANTAC) 150 MG capsule Take 150 mg by mouth 2 (two) times daily.      DENOSUMAB (PROLIA SUBQ) Inject into the skin every 6 (six) months.       polyethylene glycol (GLYCOLAX) 17 gram PwPk Take 17 g by mouth once daily.  Refills: 0         STOP taking these medications       furosemide (LASIX) 40 MG tablet Comments:   Reason for Stopping:               Indwelling Lines/Drains at time of discharge:   Lines/Drains/Airways     Drain                 Hemodialysis AV Fistula Right forearm -- days                Time spent on the discharge of patient: > 30 minutes  Patient was seen and examined on the date of discharge and determined to be suitable for discharge.         Rene Smith MD  Department of Hospital Medicine  [facility]

## 2017-12-02 NOTE — CONSULTS
Consult Note  Nephrology    Consult Requested By: Rene Smith MD  Reason for Consult: LLOYD, Hypernatremia    SUBJECTIVE:     History of Present Illness:  Patient is a 89 y.o. female with Alzheimer's dementia, hypercalcemia due to primary HPTH, CKD Stage 4/5, well-known to our group practice, admitted for symptomatic hypernatremia.  Dr. Arvizu has discussed extensively with son and daughter regarding poor prognosis and goals of care.  They want to transition to inpatient hospice.    Overnight, pt received D5W.  They are waiting on [facility] rep to discuss transition of care.    Past Medical History:   Diagnosis Date    CKD (chronic kidney disease), stage IV     Dementia 11/2/2016    2012: Diagnosed with Alzheimer's Dementia.    HPTH (hyperparathyroidism)     Hypertension     Osteoporosis      Past Surgical History:   Procedure Laterality Date    parathyroid removal       Family History   Problem Relation Age of Onset    Hypertension Father     Cancer Sister     Hearing loss Sister      Social History   Substance Use Topics    Smoking status: Never Smoker    Smokeless tobacco: Never Used    Alcohol use No       Review of patient's allergies indicates:   Allergen Reactions    Penicillins      Doesn't remember it has been years        Review of Systems:  Review of systems not obtained due to patient factors pt altered..     OBJECTIVE:     Vital Signs (Most Recent)  Temp: 98 °F (36.7 °C) (12/02/17 0200)  Pulse: 86 (12/02/17 0200)  Resp: 17 (12/02/17 0200)  BP: (!) 147/93 (12/02/17 0200)  SpO2: 99 % (12/02/17 0200)    Vital Signs Range (Last 24H):  Temp:  [98 °F (36.7 °C)-98.4 °F (36.9 °C)]   Pulse:  []   Resp:  [17-19]   BP: (147-184)/(70-93)   SpO2:  [97 %-100 %]     Physical Exam:  Gen: frail, lethargic, doesn't follow commands  HEENT: dry mm, sclera anicteric  CV: RRR, no m/r  Resp: CTAB, no rales or wheezes  GI: soft, ND, NTTP, +BS  Extr: no edema  Skin: diffuse ecchymoses of UE      amLODIPine  10 mg Oral Daily    aspirin  81 mg Oral Daily    cinacalcet  90 mg Oral BID WM    donepezil  10 mg Oral Daily    FLUoxetine  20 mg Oral Daily    heparin (porcine)  5,000 Units Subcutaneous Q12H    levothyroxine  150 mcg Oral Daily    QUEtiapine  25 mg Oral QHS       Laboratory:      Recent Labs  Lab 12/02/17  0456 12/02/17  0853 12/02/17  1224   * 167* 164*   K 4.1 4.1 4.5   CL >130* 130* 130*   CO2 24 25 21*   * 115* 118*   CREATININE 6.3* 6.0* 5.8*   CALCIUM 9.1 8.8 8.8   PHOS 5.3*  --   --        Recent Labs  Lab 12/01/17  2114 12/02/17  0456   WBC 7.77 7.34   HGB 10.8* 10.1*   HCT 35.6* 33.7*    210     Diagnostic Results:  Labs: Reviewed    ASSESSMENT/PLAN:     89 YO WF with LLOYD on CKD4B, Alzheimer's dementia, primary hyperparathyroidism with symptomatic hypercalcemia and volume depletion.        1. Labile LLOYD on CKD4B: Likely due to volume depletion/ hypernatremia. She is not a dialysis candidate.    2. HTN: Normotensive now.  3. Alz Dementia: Exacerbated by hypernatremia.    4. Hypothyroidism: On synthroid. .  5. Resolved Hypernatremia (E87.0): On D5W with slow correction while inpatient.   6. Dispo:  Discussed again goals of care with pt's children.  They want to pursue inpatient hospice.  They understand their mother is terminal.  DNR.     D/W Dr. Smith.  Transfer to inpatient hospice when bed available.    Thank you for the consult.  We will continue to follow along with you.    Moisés Larsen MD  Nephrology

## 2017-12-02 NOTE — ED NOTES
Pt lying down in bed,respirations even/unlabored. NAD noted. PT status unchanged. Updated daughter and pt on POC. Side rails up x2, call bell within reach. Will continue to monitor.

## 2017-12-02 NOTE — NURSING
"Pt's family voiced concern over frequency of vital signs (q4hr) as "the pt really doesn't like it" and pt complaining that it hurts. Reviewed frequency w/ C. JEFF Galo; per AZAM PA ok to change to q shift. Cardiac monitor not placed as family confirms pt's DNR status stating they will be seeking hospice care and the leads and electrodes would on agitate pt further. Review this w/ AZAM PA too; ok to leave tele off. Will modify orders per telephone order from AZAM PA.   "

## 2017-12-02 NOTE — PLAN OF CARE
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 Mild AS - 2.0 m/s - 1.9 cm2.  [place] [facility]: Mild HF. BNP 1,507.       Hypothyroidism [E03.9]  Yes     1990: Diagnosed.      Dementia [F03.90]  Yes     2012: Diagnosed with Alzheimer's Dementia.      Carotid artery stenosis [I65.29]  Yes     2002: Left CEA.  10/26/2016: Carotid Duplex: GABBIE: Moderate plaquing - 1.9 m/s - 60-70%. LCCA: Proximal 1.7 m/s - 50-70%. LICA: Moderate plaquing - <50%.      Hypertension [I10]  Yes     2000: Diagnosed.      Hyperparathyroidism [E21.3]  Yes     2010: Diagnosed.        Resolved Hospital Problems    Diagnosis Date Resolved POA   No resolved problems to display.       Hospice Qualifying Diagnoses:  Acute on chronic renal failure; hypernatremia.       Patient has a life expectancy < 6 months due to these conditions.    Vital Signs: Routine per Hospice Protocol.    Allergies:  Review of patient's allergies indicates:   Allergen Reactions    Penicillins      Doesn't remember it has been years       Diet: Pureed as tolerated.  Aspiration precautions.     Activities: Bed bound; further changes per discretion per hospice MD    Nursing: Per Hospice Routine    Future Orders:  Hospice Medical Director may dictate new orders for comfortable care measures & sign death certificate.    Medications:         Comfort Care Medications Only     Medications per discretion of hospice physician.      Mary Singleton   Home Medication Instructions  DEB:[id number]    Printed on:12/02/17 1427   Medication Information                      amlodipine (NORVASC) 10 MG tablet  Take 10 mg by mouth once daily.             aspirin 81 MG Chew  Take 81 mg by mouth once daily.             calcitRIOL (ROCALTROL) 0.25 MCG Cap  Take 0.25 mcg by mouth once daily.             cetirizine (ZYRTEC) 5 MG tablet  Take 5 mg by mouth once daily.             cinacalcet (SENSIPAR) 60 MG Tab  Take 1 tablet (60 mg total) by mouth 2 (two) times daily with meals.             DENOSUMAB (PROLIA SUBQ)  Inject into the skin every 6 (six) months.              donepezil (ARICEPT) 10 MG tablet  Take 10 mg by mouth once daily.              fluoxetine (PROZAC) 20 MG capsule  Take 20 mg by mouth once daily.             levothyroxine (SYNTHROID) 150 MCG tablet  Take 150 mcg by mouth once daily.             megestrol (MEGACE) 40 MG Tab  Take 40 mg by mouth once daily.             polyethylene glycol (GLYCOLAX) 17 gram PwPk  Take 17 g by mouth once daily.             quetiapine (SEROQUEL) 25 MG Tab  Take 25 mg by mouth every evening.              ranitidine (ZANTAC) 150 MG capsule  Take 150 mg by mouth 2 (two) times daily.                         _________________________________  Rene Smith MD  12/02/2017

## 2017-12-04 LAB — POCT GLUCOSE: 104 MG/DL (ref 70–110)

## 2019-05-07 NOTE — ED NOTES
[address] Transitions Interview     2019    Patient: Wilberto Schuler Patient : 1946   MRN: [id number]  Reason for Admission: bradycardia  RARS: Readmission Risk Score: 10         Spoke with: Wilberto Hodge. Ganga (Patient)      Readmission Risk  Patient Active Problem List   Diagnosis    Cerebral atherosclerosis    Hypercholesterolemia    Right cataract    Bradycardia       Inpatient Assessment  Care Transitions Summary    Care Transitions Inpatient Review  Medication Review  Are you able to afford your medications?:  Yes  How often do you have difficulty taking your medications?:  I always take them as prescribed. Housing Review  Who do you live with?:  Partner/Spouse/SO  Are you an active caregiver in your home?:  No  Social Support  Do you have a ?:  No  Do you have a [facility], [facility]?:  No  Durable Medical Equipment  Functional Review  Ability to seek help/take action for Emergent/Urgent situations i.e. fire, crime, inclement weather or health crisis. :  Independent  Ability handle personal hygiene needs (bathing/dressing/grooming): Independent  Ability to manage medications: Independent  Ability to prepare food:  Independent  Ability to maintain home (clean home, laundry): Independent  Ability to drive and/or has transportation:  Independent  Ability to do shopping:  Independent  Ability to manage finances: Independent  Is patient able to live independently?:  Yes  Hearing and Vision  Visual Impairment:  Visual impairment (Glasses/contacts)  Hearing Impairment:  Hard of hearing  Care Transitions Interventions  No Identified Needs       Met with patient today 19 at bedside with no family present. Explained role and reason for visit. Provided and explained BPCI-A letter and [address] brochure. Patient is receptive to be followed by [facility] post hospital dsicharge for BPCI bundle program if applicable.  Confirmed patient follows with  MD order to give 500 cc NS bolus before 1000 cc NS @ 250 mL/hr. Awaiting renal labs.    Piotr Cedillo for primary care, Dr. Bety Schmidt for cardiology and Dr. Tito Chinchilla for neurology. Patient states having a history of brain aneurysm with surgery in 1993 at [facility]. States having tremors and recently had dose increase on Propanolol which caused heart rate to drop to low. States she monitors blood pressure and heart rate daily at home. Patient states she noticed heart rate dropping lower and lower over the past week. Denies having any symptoms of dizziness, feeling faint, difficulty breathing, chest pain or chest discomfort. States she was smoking 5-7 cigarettes per day up until admission. [facility] counseled on the importance and benefits of quitting smoking, potential risk factors related to smoking and how smoking can effect healing process post operatively which she acknowledges. Confirmed patient is scheduled to have pacemaker insertion tomorrow per Dr. Bety Schmidt. Patient states her and SO Micha Chun) live in one level condo together. States having 2 outside steps and approximately 15 steps down to basement. Denies any DME. States she was independent with ADL's/IADL's including driving PTA. States having support from SO and close girlfriends. Denies any history of HHC, [facility] or SNF. States plan is to return home after discharge and denies any needs at this time. [facility] will remain available for any needed assistance.              Follow Up  Future Appointments   Date Time Provider Jefferson Rutledge   5/28/2019  2:20 PM Cassius Whitten MD [address] Maintenance  Health Maintenance Due   Topic Date Due    DEXA (modify frequency per FRAX score)  06/29/2011       FRANK Maxwell

## 2021-03-23 NOTE — ASSESSMENT & PLAN NOTE
- bisacodyl suppository ordered & miralax begun     ----- Message from Marion Garza RN sent at 3/16/2021  1:54 PM CDT -----  Regarding: CHF weekly  Lenin pt  CHF weekly and PRN follow up. DC 2/22/21.  Follow up acutely through 3/22/21.    DC from [facility] to [facility] in [facility]  P   F

## 2021-07-01 ENCOUNTER — PATIENT MESSAGE (OUTPATIENT)
Dept: ADMINISTRATIVE | Facility: OTHER | Age: 86
End: 2021-07-01